# Patient Record
Sex: FEMALE | Race: BLACK OR AFRICAN AMERICAN | NOT HISPANIC OR LATINO | Employment: STUDENT | URBAN - METROPOLITAN AREA
[De-identification: names, ages, dates, MRNs, and addresses within clinical notes are randomized per-mention and may not be internally consistent; named-entity substitution may affect disease eponyms.]

---

## 2017-08-28 ENCOUNTER — PATIENT OUTREACH (OUTPATIENT)
Dept: ADMINISTRATIVE | Facility: HOSPITAL | Age: 22
End: 2017-08-28

## 2019-10-24 ENCOUNTER — OFFICE VISIT (OUTPATIENT)
Dept: INTERNAL MEDICINE | Facility: CLINIC | Age: 24
End: 2019-10-24
Payer: COMMERCIAL

## 2019-10-24 ENCOUNTER — HOSPITAL ENCOUNTER (OUTPATIENT)
Dept: RADIOLOGY | Facility: HOSPITAL | Age: 24
Discharge: HOME | End: 2019-10-24
Attending: INTERNAL MEDICINE
Payer: COMMERCIAL

## 2019-10-24 VITALS
HEART RATE: 86 BPM | WEIGHT: 145.75 LBS | TEMPERATURE: 98 F | BODY MASS INDEX: 26.23 KG/M2 | SYSTOLIC BLOOD PRESSURE: 112 MMHG | OXYGEN SATURATION: 99 % | DIASTOLIC BLOOD PRESSURE: 80 MMHG

## 2019-10-24 DIAGNOSIS — M54.6 ACUTE MIDLINE THORACIC BACK PAIN: ICD-10-CM

## 2019-10-24 DIAGNOSIS — M54.6 ACUTE MIDLINE THORACIC BACK PAIN: Primary | ICD-10-CM

## 2019-10-24 DIAGNOSIS — M54.9 TENDERNESS OVER SPINE: ICD-10-CM

## 2019-10-24 PROCEDURE — 99999 PR PBB SHADOW E&M-EST. PATIENT-LVL III: CPT | Mod: PBBFAC,,, | Performed by: INTERNAL MEDICINE

## 2019-10-24 PROCEDURE — 99214 OFFICE O/P EST MOD 30 MIN: CPT | Mod: S$GLB,,, | Performed by: INTERNAL MEDICINE

## 2019-10-24 PROCEDURE — 72070 X-RAY EXAM THORAC SPINE 2VWS: CPT | Mod: 26,,, | Performed by: RADIOLOGY

## 2019-10-24 PROCEDURE — 3008F PR BODY MASS INDEX (BMI) DOCUMENTED: ICD-10-PCS | Mod: CPTII,S$GLB,, | Performed by: INTERNAL MEDICINE

## 2019-10-24 PROCEDURE — 99999 PR PBB SHADOW E&M-EST. PATIENT-LVL III: ICD-10-PCS | Mod: PBBFAC,,, | Performed by: INTERNAL MEDICINE

## 2019-10-24 PROCEDURE — 3008F BODY MASS INDEX DOCD: CPT | Mod: CPTII,S$GLB,, | Performed by: INTERNAL MEDICINE

## 2019-10-24 PROCEDURE — 99214 PR OFFICE/OUTPT VISIT, EST, LEVL IV, 30-39 MIN: ICD-10-PCS | Mod: S$GLB,,, | Performed by: INTERNAL MEDICINE

## 2019-10-24 PROCEDURE — 72070 XR THORACIC SPINE AP LATERAL: ICD-10-PCS | Mod: 26,,, | Performed by: RADIOLOGY

## 2019-10-24 PROCEDURE — 72070 X-RAY EXAM THORAC SPINE 2VWS: CPT | Mod: TC

## 2019-10-24 RX ORDER — CYCLOBENZAPRINE HCL 10 MG
TABLET ORAL
Qty: 30 TABLET | Refills: 0 | Status: SHIPPED | OUTPATIENT
Start: 2019-10-24 | End: 2020-10-12

## 2019-10-24 RX ORDER — METHYLPREDNISOLONE 4 MG/1
TABLET ORAL
Qty: 1 PACKAGE | Refills: 0 | Status: SHIPPED | OUTPATIENT
Start: 2019-10-24 | End: 2020-10-12

## 2019-10-24 NOTE — PROGRESS NOTES
Subjective:      Patient ID: Swati Durán is a 24 y.o. female.    Chief Complaint: Back Pain    Back Pain   This is a new problem. The current episode started in the past 7 days. The problem occurs constantly. The problem is unchanged. The pain is present in the thoracic spine. The quality of the pain is described as aching and stabbing. The pain does not radiate. The pain is at a severity of 7/10. The pain is moderate. The pain is the same all the time. The symptoms are aggravated by bending, position and twisting. Stiffness is present all day. Pertinent negatives include no abdominal pain, bladder incontinence, bowel incontinence, chest pain, dysuria, fever, headaches, leg pain, numbness, paresis, paresthesias, pelvic pain, perianal numbness, tingling, weakness or weight loss. She has tried NSAIDs and heat for the symptoms. The treatment provided mild relief.      25 yo with   Patient Active Problem List   Diagnosis    Chronic dislocation of right shoulder    Myofacial muscle pain    Rotator cuff tendinitis    Winged scapula of right side    Recurrent subluxation of shoulder     History reviewed. No pertinent past medical history.  Here today c/o back pain.   Woke up with pain. No trauma. Worsened by twisting to the left and  rising from a bent over position and extension.   Answers for HPI/ROS submitted by the patient on 10/24/2019   Back pain  genital pain: No    History reviewed. No pertinent surgical history.  Social History     Socioeconomic History    Marital status: Single     Spouse name: Not on file    Number of children: Not on file    Years of education: Not on file    Highest education level: Not on file   Occupational History    Not on file   Social Needs    Financial resource strain: Not very hard    Food insecurity:     Worry: Never true     Inability: Never true    Transportation needs:     Medical: No     Non-medical: No   Tobacco Use    Smoking status: Never Smoker     Smokeless tobacco: Never Used   Substance and Sexual Activity    Alcohol use: No     Alcohol/week: 0.0 standard drinks     Frequency: Monthly or less     Drinks per session: 1 or 2     Binge frequency: Never    Drug use: No    Sexual activity: Not on file   Lifestyle    Physical activity:     Days per week: 6 days     Minutes per session: 70 min    Stress: Not at all   Relationships    Social connections:     Talks on phone: Three times a week     Gets together: Once a week     Attends [employer] service: Not on file     Active member of club or organization: Yes     Attends meetings of clubs or organizations: More than 4 times per year     Relationship status: Never    Other Topics Concern    Not on file   Social History Narrative    Not on file     family history includes Breast cancer in her maternal grandmother; Hypertension in her mother; Lung cancer in her paternal grandfather; No Known Problems in her father.    Review of Systems   Constitutional: Negative for fever and weight loss.   Respiratory: Negative for shortness of breath and wheezing.    Cardiovascular: Negative for chest pain, palpitations and leg swelling.   Gastrointestinal: Negative for abdominal pain and bowel incontinence.   Genitourinary: Negative for bladder incontinence, dysuria, hematuria and pelvic pain.   Musculoskeletal: Positive for back pain. Negative for gait problem, joint swelling, neck pain and neck stiffness.   Skin: Negative for rash.   Neurological: Negative for dizziness, tingling, weakness, light-headedness, numbness, headaches and paresthesias.     Objective:   /80 (BP Location: Right arm, Patient Position: Sitting, BP Method: Medium (Manual))   Pulse 86   Temp 97.5 °F (36.4 °C) (Tympanic)   Wt 66.1 kg (145 lb 11.6 oz)   LMP 10/18/2019   SpO2 99%   BMI 26.23 kg/m²     Physical Exam   Constitutional: She is oriented to person, place, and time. She appears well-developed and well-nourished. No  distress.   HENT:   Head: Atraumatic.   Right Ear: External ear normal.   Eyes: Conjunctivae and EOM are normal.   Neck: Normal range of motion. Neck supple.   Cardiovascular: Normal rate and regular rhythm.   Pulmonary/Chest: Effort normal and breath sounds normal.   Musculoskeletal: She exhibits tenderness. She exhibits no edema or deformity.        Thoracic back: She exhibits tenderness and bony tenderness. She exhibits normal range of motion, no swelling and no spasm.        Back:    Neurological: She is alert and oriented to person, place, and time.   Skin: Skin is warm and dry.   Psychiatric: She has a normal mood and affect. Her behavior is normal.       Assessment:     1. Acute midline thoracic back pain      Plan:   Acute midline thoracic back pain  -     cyclobenzaprine (FLEXERIL) 10 MG tablet; 1/2 to one tab po qhs prn muscle spasm  Dispense: 30 tablet; Refill: 0  -     methylPREDNISolone (MEDROL, YANET,) 4 mg tablet; use as directed  Dispense: 1 Package; Refill: 0  -     X-Ray Thoracic Spine AP Lateral; Future; Expected date: 10/24/2019    advised avoid impact exercising and heaving lifting until healed.         Problem List Items Addressed This Visit     None      Visit Diagnoses     Acute midline thoracic back pain    -  Primary    Relevant Medications    cyclobenzaprine (FLEXERIL) 10 MG tablet    methylPREDNISolone (MEDROL, YANET,) 4 mg tablet    Other Relevant Orders    X-Ray Thoracic Spine AP Lateral          Follow up if symptoms worsen or fail to improve.

## 2020-02-17 ENCOUNTER — HOSPITAL ENCOUNTER (OUTPATIENT)
Dept: RADIOLOGY | Facility: HOSPITAL | Age: 25
Discharge: HOME | End: 2020-02-17
Attending: ORTHOPAEDIC SURGERY
Payer: COMMERCIAL

## 2020-02-17 ENCOUNTER — OFFICE VISIT (OUTPATIENT)
Dept: ORTHOPEDICS | Facility: CLINIC | Age: 25
End: 2020-02-17
Payer: COMMERCIAL

## 2020-02-17 ENCOUNTER — TELEPHONE (OUTPATIENT)
Dept: ORTHOPEDICS | Facility: CLINIC | Age: 25
End: 2020-02-17

## 2020-02-17 VITALS
WEIGHT: 145 LBS | HEART RATE: 59 BPM | DIASTOLIC BLOOD PRESSURE: 74 MMHG | BODY MASS INDEX: 25.69 KG/M2 | HEIGHT: 63 IN | SYSTOLIC BLOOD PRESSURE: 112 MMHG

## 2020-02-17 DIAGNOSIS — M25.311 INSTABILITY OF BOTH SHOULDER JOINTS: Primary | ICD-10-CM

## 2020-02-17 DIAGNOSIS — M25.511 RIGHT SHOULDER PAIN, UNSPECIFIED CHRONICITY: ICD-10-CM

## 2020-02-17 DIAGNOSIS — M25.311 INSTABILITY OF RIGHT SHOULDER JOINT: Primary | ICD-10-CM

## 2020-02-17 DIAGNOSIS — M25.511 RIGHT SHOULDER PAIN, UNSPECIFIED CHRONICITY: Primary | ICD-10-CM

## 2020-02-17 DIAGNOSIS — M25.312 INSTABILITY OF BOTH SHOULDER JOINTS: Primary | ICD-10-CM

## 2020-02-17 PROCEDURE — 73030 XR SHOULDER COMPLETE 2 OR MORE VIEWS RIGHT: ICD-10-PCS | Mod: 26,RT,, | Performed by: RADIOLOGY

## 2020-02-17 PROCEDURE — 73030 X-RAY EXAM OF SHOULDER: CPT | Mod: TC,RT

## 2020-02-17 PROCEDURE — 99244 PR OFFICE CONSULTATION,LEVEL IV: ICD-10-PCS | Mod: S$GLB,,, | Performed by: ORTHOPAEDIC SURGERY

## 2020-02-17 PROCEDURE — 73030 X-RAY EXAM OF SHOULDER: CPT | Mod: 26,RT,, | Performed by: RADIOLOGY

## 2020-02-17 PROCEDURE — 99999 PR PBB SHADOW E&M-EST. PATIENT-LVL III: ICD-10-PCS | Mod: PBBFAC,,, | Performed by: ORTHOPAEDIC SURGERY

## 2020-02-17 PROCEDURE — 99244 OFF/OP CNSLTJ NEW/EST MOD 40: CPT | Mod: S$GLB,,, | Performed by: ORTHOPAEDIC SURGERY

## 2020-02-17 PROCEDURE — 99999 PR PBB SHADOW E&M-EST. PATIENT-LVL III: CPT | Mod: PBBFAC,,, | Performed by: ORTHOPAEDIC SURGERY

## 2020-02-17 RX ORDER — MELOXICAM 15 MG/1
15 TABLET ORAL DAILY
Qty: 30 TABLET | Refills: 2 | Status: SHIPPED | OUTPATIENT
Start: 2020-02-17 | End: 2020-10-12

## 2020-02-17 NOTE — LETTER
February 17, 2020      Danika Fitzgerald MD  [address] [place] [address]           [facility]  [address] [address]  [place] [address]  Phone: [phone]  Fax: [phone]          Patient: Swati Durán   MR Number: [id number]   YOB: 1995   Date of Visit: 2/17/2020       Dear Dr. Danika Fitzgerald:    Thank you for referring Swati Durán to me for evaluation. Attached you will find relevant portions of my assessment and plan of care.    If you have questions, please do not hesitate to call me. I look forward to following Swati Durán along with you.    Sincerely,    Kalyan Magana MD    Enclosure  CC:  No Recipients    If you would like to receive this communication electronically, please contact [email or web address] or [phone] to request more information on [employer] access.    For providers and/or their staff who would like to refer a patient to [facility], please contact us through our one-stop-shop provider referral line, [facility] , at [phone].    If you feel you have received this communication in error or would no longer like to receive these types of communications, please e-mail [email or web address]

## 2020-02-17 NOTE — PROGRESS NOTES
Subjective:     Patient ID: Swati Durán is a 24 y.o. female.    Chief Complaint: Pain of the Right Shoulder    Consult from Dr. Danika Fitzgerald  will receive report electronically     Swati Durán is a 24 y.o. female here because she has a history of Right shoulder instability. She is starting a new job at [employer] as a .    2010 first dislocation. Last 2018. Approximately 3 times needing reduction.  Did PT/OT in 2016 with improvement.    Had MRA 2018 at [facility].    Shoulder Pain    The pain is present in the right shoulder. This is a recurrent problem. The current episode started more than 1 year ago. The problem has been resolved. The pain is at a severity of 0/10. Pertinent negatives include no fever or numbness. She has tried brace/corset, rest, injection treatment, heat and cold for the symptoms. The treatment provided significant relief. Physical therapy was effective.      History reviewed. No pertinent past medical history.  History reviewed. No pertinent surgical history.  Family History   Problem Relation Age of Onset    Hypertension Mother     No Known Problems Father     Breast cancer Maternal Grandmother     Lung cancer Paternal Grandfather     Diabetes Neg Hx     Stroke Neg Hx      Social History     Socioeconomic History    Marital status: Single     Spouse name: Not on file    Number of children: Not on file    Years of education: Not on file    Highest education level: Not on file   Occupational History    Not on file   Social Needs    Financial resource strain: Not very hard    Food insecurity:     Worry: Never true     Inability: Never true    Transportation needs:     Medical: No     Non-medical: No   Tobacco Use    Smoking status: Never Smoker    Smokeless tobacco: Never Used   Substance and Sexual Activity    Alcohol use: No     Alcohol/week: 0.0 standard drinks     Frequency: Monthly or less     Drinks per session: 1 or 2      Binge frequency: Never    Drug use: No    Sexual activity: Not on file   Lifestyle    Physical activity:     Days per week: 6 days     Minutes per session: 70 min    Stress: Not at all   Relationships    Social connections:     Talks on phone: Three times a week     Gets together: Once a week     Attends Confucianist service: Not on file     Active member of club or organization: Yes     Attends meetings of clubs or organizations: More than 4 times per year     Relationship status: Never    Other Topics Concern    Not on file   Social History Narrative    Not on file     Medication List with Changes/Refills   New Medications    MELOXICAM (MOBIC) 15 MG TABLET    Take 1 tablet (15 mg total) by mouth once daily.   Current Medications    CYCLOBENZAPRINE (FLEXERIL) 10 MG TABLET    1/2 to one tab po qhs prn muscle spasm    METHYLPREDNISOLONE (MEDROL, YANET,) 4 MG TABLET    use as directed     Review of patient's allergies indicates:  No Known Allergies  Review of Systems   Constitution: Negative for chills and fever.   HENT: Negative for ear discharge and hearing loss.    Eyes: Negative for blurred vision and visual disturbance.   Cardiovascular: Negative for chest pain and leg swelling.   Respiratory: Negative for cough and shortness of breath.    Endocrine: Negative for polyuria.   Hematologic/Lymphatic: Negative for bleeding problem.   Skin: Negative for rash.   Musculoskeletal: Negative for back pain, joint pain, joint swelling, muscle cramps and muscle weakness.   Gastrointestinal: Negative for nausea and vomiting.   Genitourinary: Negative for hematuria.   Neurological: Negative for loss of balance, numbness and paresthesias.   Psychiatric/Behavioral: Negative for altered mental status.       Objective:   Body mass index is 26.1 kg/m².  Vitals:    02/17/20 0931   BP: 112/74   Pulse: (!) 59                General    Vitals reviewed.  Constitutional: She is oriented to person, place, and time. She appears  well-developed and well-nourished. No distress.   HENT:   Head: Atraumatic.   Nose: Nose normal.   Eyes: EOM are normal. Right eye exhibits no discharge. Left eye exhibits no discharge.   Neck: Neck supple.   Cardiovascular: Normal rate and intact distal pulses.    Pulmonary/Chest: Effort normal. No respiratory distress.   Neurological: She is alert and oriented to person, place, and time. Coordination normal.   Psychiatric: She has a normal mood and affect. Her behavior is normal. Judgment and thought content normal.         Right Shoulder Exam     Inspection/Observation   Swelling: absent  Bruising: absent  Scars: absent  Deformity: absent  Scapular Winging: absent  Scapular Dyskinesia: negative  Atrophy: absent    Tenderness   The patient is tender to palpation of the biceps tendon.    Range of Motion   Active abduction: 90   Passive abduction: 100   Extension: 0   Forward Flexion: 180   Forward Elevation: 180Adduction: 40   External Rotation 0 degrees: 50   Internal rotation 0 degrees: T6     Tests & Signs   Apprehension: positive  Drop arm: negative  Henderson test: negative  Impingement: negative  Lift Off Sign: negative  Active Compression Test (Klamath's Sign): positive  Speed's Test: positive    Other   Sensation: normal    Comments:  +posterior load/shift    Left Shoulder Exam     Inspection/Observation   Swelling: absent  Bruising: absent  Scars: absent  Deformity: absent  Scapular Winging: absent  Scapular Dyskinesia: negative  Atrophy: absent    Tenderness   The patient is tender to palpation of the biceps tendon.    Range of Motion   Active abduction: 90   Passive abduction: 100   Extension: 0   Forward Flexion: 180   Forward Elevation: 180Adduction: 40   External Rotation 0 degrees: 50   Internal rotation 0 degrees: T6     Tests & Signs   Apprehension: positive  Drop arm: negative  Henderson test: negative  Impingement: negative  Lift Off Sign: negative  Active Compression test (Klamath's Sign):  negative  Speed's Test: negative  Bear Hug: negative    Other   Sensation: normal     Comments:  +posterior load/shift      Muscle Strength   Right Upper Extremity   Shoulder Abduction: 5/5   Shoulder Internal Rotation: 5/5   Shoulder External Rotation: 5/5   Supraspinatus: 5/5/5   Subscapularis: 5/5/5   Biceps: 5/5/5   Left Upper Extremity  Shoulder Abduction: 5/5   Shoulder Internal Rotation: 5/5   Shoulder External Rotation: 5/5   Supraspinatus: 5/5/5   Subscapularis: 5/5/5   Biceps: 5/5/5     Reflexes     Left Side  Biceps:  2+  Triceps:  2+    Right Side   Biceps:  2+  Triceps:  2+    Vascular Exam     Right Pulses      Radial:                    2+      Left Pulses      Radial:                    2+      Capillary Refill  Right Hand: normal capillary refill  Left Hand: normal capillary refill      Relevant imaging results reviewed and interpreted by me, discussed with the patient and / or family today   Possible small bankart avulsion on axial view.  X-ray Shoulder 2 or More Views Right  Narrative: EXAMINATION:  XR SHOULDER COMPLETE 2 OR MORE VIEWS RIGHT    CLINICAL HISTORY:  Pain in right shoulder    TECHNIQUE:  Two or three views of the right shoulder were performed.    COMPARISON:  11/13/2015    FINDINGS:  No acute osseous or soft tissue abnormality.  Impression: As above    Electronically signed by: Nicho Morgan MD  Date:    02/17/2020  Time:    09:25     Assessment:     Encounter Diagnosis   Name Primary?    Instability of right shoulder joint Yes        Plan:     We reviewed with Swati today, the pathology and natural history of her diagnosis. We had an extensive discussion as to the conservative treatment and management of their condition. We also discussed the variety of treatment options to include medication, physical therapy, diagnostic testing as well as other treatments.The decision was made to go forward with:    -PT/OT-can eval for any limitations for her work  -F/up 6-8wk  -obtain MRA  result from [facility]  Discussed possible need for surgery if fails PT/OT for recurrent instability  -mobic prn        Disclaimer: This note was prepared using a voice recognition system and is likely to have sound alike errors within the text.

## 2020-02-17 NOTE — TELEPHONE ENCOUNTER
Spoke with pt and requested she get MRA disc mailed to us, or she can drop it off. She states she will drop off as soon as she gets it done, AL, LPN.       ----- Message from Kalyan Magana MD sent at 2/17/2020 12:13 PM CST -----  Please obtain MRA disc/results from [facility] of her right shoulder. Thanks.

## 2020-02-28 ENCOUNTER — TELEPHONE (OUTPATIENT)
Dept: ORTHOPEDICS | Facility: CLINIC | Age: 25
End: 2020-02-28

## 2020-02-28 NOTE — TELEPHONE ENCOUNTER
Called pt to inform her that her outside images was successfully uploaded into her chart. I asked her if she would like the disc mailed back, she agreed and confirmed her mailing address.

## 2020-03-11 ENCOUNTER — CLINICAL SUPPORT (OUTPATIENT)
Dept: REHABILITATION | Facility: HOSPITAL | Age: 25
End: 2020-03-11
Attending: ORTHOPAEDIC SURGERY
Payer: COMMERCIAL

## 2020-03-11 DIAGNOSIS — G25.89 SCAPULAR DYSKINESIS: ICD-10-CM

## 2020-03-11 DIAGNOSIS — M25.312 INSTABILITY OF BOTH SHOULDER JOINTS: ICD-10-CM

## 2020-03-11 DIAGNOSIS — R53.1 WEAKNESS: Primary | ICD-10-CM

## 2020-03-11 DIAGNOSIS — M25.311 INSTABILITY OF BOTH SHOULDER JOINTS: ICD-10-CM

## 2020-03-11 PROCEDURE — 97140 MANUAL THERAPY 1/> REGIONS: CPT

## 2020-03-11 PROCEDURE — 97110 THERAPEUTIC EXERCISES: CPT

## 2020-03-11 PROCEDURE — 97161 PT EVAL LOW COMPLEX 20 MIN: CPT
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
## 2020-03-13 ENCOUNTER — CLINICAL SUPPORT (OUTPATIENT)
Dept: REHABILITATION | Facility: HOSPITAL | Age: 25
End: 2020-03-13
Payer: COMMERCIAL

## 2020-03-13 DIAGNOSIS — G25.89 SCAPULAR DYSKINESIS: ICD-10-CM

## 2020-03-13 DIAGNOSIS — R53.1 WEAKNESS: ICD-10-CM

## 2020-03-13 PROCEDURE — 97110 THERAPEUTIC EXERCISES: CPT

## 2020-03-13 PROCEDURE — 97140 MANUAL THERAPY 1/> REGIONS: CPT

## 2020-03-14 NOTE — PROGRESS NOTES
Physical Therapy Daily Treatment Note     Name: Swati Swann Leeanna  Clinic Number: [id number]    Therapy Diagnosis:   Encounter Diagnoses   Name Primary?    Weakness     Scapular dyskinesis      Physician: Kalyan Magana, *    Visit Date: 3/13/2020    Physician Orders: PT Eval and Treat   Medical Diagnosis from Referral: Instability of both shoulder joints  Evaluation Date: 3/11/2020  Authorization Period Expiration: 9/1/2020  Plan of Care Expiration: 4/26/2020  Visit # / Visits authorized: 2/20     Time In: 8:30 am  Time Out: 9:30 am  Total Billable Time: 39 minutes    Precautions: Standard    Subjective     Pt reports: She has tried her exercises and she can tell they are working the muscles in her shoulders and shoulder blades.    She was compliant with home exercise program.  Response to previous treatment: Good  Functional change: None reported    Pain: 0/10  Location: bilateral shoulders      Objective     Swati received therapeutic exercises to develop strength, endurance, posture and scapular stability for 29 minutes including:    LT isometrics prone 2'  scap retraction isometrics prone 2'  Peter pickering 2/10  ABC's 1#, 1 x ea  Serratus punch 1#, 2/10  Chest press/elbow extension-supine, arm at side no extension 1#, 2/10  Scapular slides with foam roll 10x  Scapular clocks 6,7,8,9 ( mid scapula) 10x/ea  Glynn V (no ABD) 10x/2 RTB    Swati received the following manual therapy techniques: Myofacial release, Soft tissue Mobilization and manual releases were applied to the: lateral scapular borders, subscapularis mm, upper trap and levatr scapula B for 10 minutes    Home Exercises Provided and Patient Education Provided     Education provided:   - HEP    Written Home Exercises Provided: Patient instructed to cont prior HEP.  Exercises were reviewed and Swati was able to demonstrate them prior to the end of the session.  Swati demonstrated good  understanding of the education provided.     See  EMR under Patient Instructions for exercises provided prior visit.    Assessment     Good tolerance and performance of all therex, she requires some cues for scapular stability and posture.    Swati is progressing well towards her goals.   Pt prognosis is Good.     Pt will continue to benefit from skilled outpatient physical therapy to address the deficits listed in the problem list box on initial evaluation, provide pt/family education and to maximize pt's level of independence in the home and community environment.     Pt's spiritual, cultural and educational needs considered and pt agreeable to plan of care and goals.     Anticipated barriers to physical therapy: history of repetitive dislocations    Goals:   Short Term Goals: In 3 weeks   1.Pt to be educated on HEP.  2.Patient to increase strength by 1/2 grade.  3.Patient to improve score on the FOTO.  4. Pt to be educated on postural and body mechanics awareness.     Long Term Goals: In 6 weeks  1. Patient to improve score on the FOTO to 15% or less.  2. Patient to demo increase in UE strength to 4+/5.  3. Patient to perform daily activities including work activities, lifting overhead and increased physical activities without limitation.    Plan     Pt wishes to put therapy on hold until she returns to see Dr Magana in about 3 weeks.  She will continue with current program/HEP.    Ricarda Quintero, PT

## 2020-03-16 PROBLEM — R53.1 WEAKNESS: Status: ACTIVE | Noted: 2020-03-16

## 2020-03-16 PROBLEM — G25.89 SCAPULAR DYSKINESIS: Status: ACTIVE | Noted: 2020-03-16

## 2020-03-24 ENCOUNTER — TELEPHONE (OUTPATIENT)
Dept: ORTHOPEDICS | Facility: CLINIC | Age: 25
End: 2020-03-24

## 2020-03-24 NOTE — TELEPHONE ENCOUNTER
Attempted to reschedule pt for Tele-Health visit due to COVID-19 concern. Pt decided to cancel appt for now, states she will call back to reschedule at a later time. She was thankful for the call, AL, LPN.

## 2020-05-14 ENCOUNTER — TELEPHONE (OUTPATIENT)
Dept: ORTHOPEDICS | Facility: CLINIC | Age: 25
End: 2020-05-14

## 2020-06-15 ENCOUNTER — OFFICE VISIT (OUTPATIENT)
Dept: ORTHOPEDICS | Facility: CLINIC | Age: 25
End: 2020-06-15
Payer: COMMERCIAL

## 2020-06-15 ENCOUNTER — HOSPITAL ENCOUNTER (OUTPATIENT)
Dept: RADIOLOGY | Facility: HOSPITAL | Age: 25
Discharge: HOME | End: 2020-06-15
Attending: PHYSICAL MEDICINE & REHABILITATION
Payer: COMMERCIAL

## 2020-06-15 VITALS — WEIGHT: 145 LBS | BODY MASS INDEX: 26.68 KG/M2 | HEIGHT: 62 IN

## 2020-06-15 DIAGNOSIS — M25.512 LEFT SHOULDER PAIN, UNSPECIFIED CHRONICITY: Primary | ICD-10-CM

## 2020-06-15 DIAGNOSIS — M25.512 LEFT SHOULDER PAIN, UNSPECIFIED CHRONICITY: ICD-10-CM

## 2020-06-15 DIAGNOSIS — G89.29 CHRONIC LEFT SHOULDER PAIN: ICD-10-CM

## 2020-06-15 DIAGNOSIS — M25.512 CHRONIC LEFT SHOULDER PAIN: ICD-10-CM

## 2020-06-15 DIAGNOSIS — M25.512 ACUTE PAIN OF LEFT SHOULDER: ICD-10-CM

## 2020-06-15 DIAGNOSIS — S43.432A TEAR OF LEFT GLENOID LABRUM, INITIAL ENCOUNTER: Primary | ICD-10-CM

## 2020-06-15 PROCEDURE — 73030 XR SHOULDER COMPLETE 2 OR MORE VIEWS LEFT: ICD-10-PCS | Mod: 26,LT,, | Performed by: RADIOLOGY

## 2020-06-15 PROCEDURE — 3008F PR BODY MASS INDEX (BMI) DOCUMENTED: ICD-10-PCS | Mod: CPTII,S$GLB,, | Performed by: PHYSICAL MEDICINE & REHABILITATION

## 2020-06-15 PROCEDURE — 3008F BODY MASS INDEX DOCD: CPT | Mod: CPTII,S$GLB,, | Performed by: PHYSICAL MEDICINE & REHABILITATION

## 2020-06-15 PROCEDURE — 99999 PR PBB SHADOW E&M-EST. PATIENT-LVL III: CPT | Mod: PBBFAC,,, | Performed by: PHYSICAL MEDICINE & REHABILITATION

## 2020-06-15 PROCEDURE — 73030 X-RAY EXAM OF SHOULDER: CPT | Mod: TC,LT

## 2020-06-15 PROCEDURE — 73030 X-RAY EXAM OF SHOULDER: CPT | Mod: 26,LT,, | Performed by: RADIOLOGY

## 2020-06-15 PROCEDURE — 99214 OFFICE O/P EST MOD 30 MIN: CPT | Mod: S$GLB,,, | Performed by: PHYSICAL MEDICINE & REHABILITATION

## 2020-06-15 PROCEDURE — 99999 PR PBB SHADOW E&M-EST. PATIENT-LVL III: ICD-10-PCS | Mod: PBBFAC,,, | Performed by: PHYSICAL MEDICINE & REHABILITATION

## 2020-06-15 PROCEDURE — 99214 PR OFFICE/OUTPT VISIT, EST, LEVL IV, 30-39 MIN: ICD-10-PCS | Mod: S$GLB,,, | Performed by: PHYSICAL MEDICINE & REHABILITATION

## 2020-06-15 NOTE — PROGRESS NOTES
SPORTS MEDICINE / PM&R NEW PATIENT H & P:    Referring Physician: Self, Aaareferral    Chief Complaint   Patient presents with    Left Shoulder - Pain       HPI: This is a 24 y.o.  female being seen in clinic today for evaluation of Pain of the Left Shoulder   The problem first began in March of 2020, apparently after a physical exam of her shoulders. She feels sharp, dull, aching, sore and stabbing pain on her left shoulder. The symptoms are worsening. She has tried rest and heating pad without improvement. She has tried physical therapy at [facility], but only to determine work restrictions. Works as  at [employer].    History obtained from patient.    Past family, medical, social, surgical history, and vital signs reviewed in chart.    Review of Systems   Constitutional: Negative for chills, fever and weight loss.   HENT: Negative for hearing loss and sore throat.    Eyes: Negative for blurred vision, photophobia and pain.   Respiratory: Negative for shortness of breath.    Cardiovascular: Negative for chest pain.   Gastrointestinal: Negative for abdominal pain.   Genitourinary: Negative for dysuria.   Skin: Negative for rash.   Neurological: Negative for tingling and headaches.   Endo/Heme/Allergies: Does not bruise/bleed easily.   Psychiatric/Behavioral: Negative for depression.       General    Nursing note and vitals reviewed.  Constitutional: She is oriented to person, place, and time. She appears well-developed and well-nourished.   HENT:   Head: Normocephalic and atraumatic.   Eyes: Conjunctivae and EOM are normal. Pupils are equal, round, and reactive to light.   Neck: Neck supple.   Cardiovascular: Intact distal pulses.    Pulmonary/Chest: Effort normal. No respiratory distress.   Abdominal: She exhibits no distension.   Neurological: She is alert and oriented to person, place, and time. She has normal reflexes.   Psychiatric: She has a normal mood and affect.         Right Shoulder Exam    Right shoulder exam is normal.    Inspection/Observation   Scars: absent  Scapular Winging: absent  Scapular Dyskinesia: negative    Range of Motion   Active abduction: normal   Passive abduction: normal   Extension: normal   Forward Flexion: normal   Adduction: normal  External Rotation 0 degrees: normal   Internal rotation 0 degrees: normal     Muscle Strength   The patient has normal right shoulder strength.    Tests & Signs   Apprehension: negative  Henderson test: negative  Impingement: negative  Sulcus: absent  Speed's Test: negative  Jerk Test: negative    Other   Sensation: normal    Left Shoulder Exam     Inspection/Observation   Scars: absent  Scapular Winging: absent  Scapular Dyskinesia: positive    Tenderness   The patient is tender to palpation of the greater tuberosity and biceps tendon.    Range of Motion   Active abduction:  140 abnormal   Passive abduction:  150 abnormal   Extension: normal   Forward Flexion: abnormal   Adduction: normal  External Rotation 0 degrees: normal   Internal rotation 0 degrees: abnormal     Muscle Strength   The patient has normal left shoulder strength.    Tests & Signs   Apprehension: positive  Henderson test: positive  Impingement: negative  Sulcus: absent  Rotator Cuff Painful Arc/Range: mild  Speed's Test: positive  Jerk Test: positive    Other   Sensation: normal       Reflexes     Left Side  Biceps:  2+  Brachioradialis:  2+  Left Hale's Sign:  Absent    Right Side   Biceps:  2+  Brachioradialis:  2+  Right Hale's Sign:  absent    Vascular Exam     Right Pulses      Radial:                    2+      Left Pulses      Radial:                    2+      Capillary Refill  Right Hand: normal capillary refill  Left Hand: normal capillary refill        Narrative & Impression     EXAMINATION:  XR SHOULDER COMPLETE 2 OR MORE VIEWS LEFT     CLINICAL HISTORY:  get all views including Axillary;Pain in left shoulder     TECHNIQUE:  Two or three views of the left shoulder  were performed.     COMPARISON:  None     FINDINGS:  No acute osseous or soft tissue abnormality.     Impression:     As above        Electronically signed by: Nicho Morgan MD  Date:                                            06/15/2020  Time:                                           14:57         IMPRESSION/PLAN: Swati is a 24 y.o.  female with:    1. Tear of left glenoid labrum, initial encounter    2. Acute pain of left shoulder       The findings were discussed with Swati in detail. Her symptoms and exam are concerning for labral tear. She has pretty good ROM and full strength within that ROM. We obtained the x-rays above today, but note we would need MRA to fully eval for labral tear. We'll hold off on therapy until we have a more specific diagnosis. All of her questions were answered. She was provided this plan in writing. She will follow-up with me after MRI.     Vianey Zamora M.D.  Sports Medicine

## 2020-07-15 ENCOUNTER — HOSPITAL ENCOUNTER (OUTPATIENT)
Dept: RADIOLOGY | Facility: HOSPITAL | Age: 25
Discharge: HOME | End: 2020-07-15
Attending: PHYSICAL MEDICINE & REHABILITATION
Payer: COMMERCIAL

## 2020-07-15 DIAGNOSIS — M25.512 CHRONIC LEFT SHOULDER PAIN: ICD-10-CM

## 2020-07-15 DIAGNOSIS — S43.432A TEAR OF LEFT GLENOID LABRUM, INITIAL ENCOUNTER: ICD-10-CM

## 2020-07-15 DIAGNOSIS — G89.29 CHRONIC LEFT SHOULDER PAIN: ICD-10-CM

## 2020-07-15 PROCEDURE — 73040 CONTRAST X-RAY OF SHOULDER: CPT | Mod: TC,LT

## 2020-07-15 PROCEDURE — 73222 MRI JOINT UPR EXTREM W/DYE: CPT | Mod: TC,LT

## 2020-07-15 PROCEDURE — A9585 GADOBUTROL INJECTION: HCPCS | Performed by: PHYSICAL MEDICINE & REHABILITATION

## 2020-07-15 PROCEDURE — 73040 XR ARTHROGRAM SHOULDER LEFT: ICD-10-PCS | Mod: 26,LT,, | Performed by: RADIOLOGY

## 2020-07-15 PROCEDURE — 73222 MRI JOINT UPR EXTREM W/DYE: CPT | Mod: 26,LT,, | Performed by: RADIOLOGY

## 2020-07-15 PROCEDURE — 23350 XR ARTHROGRAM SHOULDER LEFT: ICD-10-PCS | Mod: ,,, | Performed by: RADIOLOGY

## 2020-07-15 PROCEDURE — 23350 INJECTION FOR SHOULDER X-RAY: CPT | Mod: ,,, | Performed by: RADIOLOGY

## 2020-07-15 PROCEDURE — 73222 MRI ARTHROGRAM SHOULDER WITH CONTRAST LEFT: ICD-10-PCS | Mod: 26,LT,, | Performed by: RADIOLOGY

## 2020-07-15 PROCEDURE — 25500020 PHARM REV CODE 255: Performed by: PHYSICAL MEDICINE & REHABILITATION

## 2020-07-15 PROCEDURE — 73040 CONTRAST X-RAY OF SHOULDER: CPT | Mod: 26,LT,, | Performed by: RADIOLOGY

## 2020-07-15 RX ORDER — GADOBUTROL 604.72 MG/ML
0.1 INJECTION INTRAVENOUS
Status: COMPLETED | OUTPATIENT
Start: 2020-07-15 | End: 2020-07-15

## 2020-07-15 RX ADMIN — GADOBUTROL 0.1 ML: 604.72 INJECTION INTRAVENOUS at 08:07

## 2020-07-15 RX ADMIN — IOHEXOL 10 ML: 350 INJECTION, SOLUTION INTRAVENOUS at 08:07

## 2020-07-17 ENCOUNTER — TELEPHONE (OUTPATIENT)
Dept: ORTHOPEDICS | Facility: CLINIC | Age: 25
End: 2020-07-17

## 2020-07-17 NOTE — TELEPHONE ENCOUNTER
----- Message from Vianey Zamora MD sent at 7/17/2020  9:46 AM CDT -----  Good morning. Please contact this patient and let her know MRI showed a small injury to some cartilage in the shoulder. Aristides looked at it and says no surgery needed, just recommends more PT. Ask her where she'd like to go and me or you can place an order. Then she can follow-up with me in 4 - 6 weeks.    Thanks,  Vianey

## 2020-07-19 DIAGNOSIS — M25.512 CHRONIC LEFT SHOULDER PAIN: Primary | ICD-10-CM

## 2020-07-19 DIAGNOSIS — G89.29 CHRONIC LEFT SHOULDER PAIN: Primary | ICD-10-CM

## 2020-07-28 ENCOUNTER — CLINICAL SUPPORT (OUTPATIENT)
Dept: REHABILITATION | Facility: HOSPITAL | Age: 25
End: 2020-07-28
Attending: PHYSICAL MEDICINE & REHABILITATION
Payer: COMMERCIAL

## 2020-07-28 DIAGNOSIS — M25.512 CHRONIC LEFT SHOULDER PAIN: ICD-10-CM

## 2020-07-28 DIAGNOSIS — R53.1 DECREASED STRENGTH: Primary | ICD-10-CM

## 2020-07-28 DIAGNOSIS — M25.512 ACUTE PAIN OF LEFT SHOULDER: ICD-10-CM

## 2020-07-28 DIAGNOSIS — G89.29 CHRONIC LEFT SHOULDER PAIN: ICD-10-CM

## 2020-07-28 PROCEDURE — 97161 PT EVAL LOW COMPLEX 20 MIN: CPT

## 2020-07-28 PROCEDURE — 97140 MANUAL THERAPY 1/> REGIONS: CPT

## 2020-07-30 PROBLEM — G25.89 SCAPULAR DYSKINESIS: Status: RESOLVED | Noted: 2020-03-16 | Resolved: 2020-07-30

## 2020-07-30 PROBLEM — R53.1 WEAKNESS: Status: RESOLVED | Noted: 2020-03-16 | Resolved: 2020-07-30

## 2020-07-30 NOTE — PLAN OF CARE
"[facility]  Physical Therapy Initial Evaluation    Name: Swati Durán  Clinic Number: [id number]    Therapy Diagnosis:   Encounter Diagnosis   Name Primary?    Chronic left shoulder pain      Physician: Vianey Zamora MD    Physician Orders: PT Eval and Treat  Medical Diagnosis from Referral: chronic left shoulder pain  Evaluation Date: 7/28/2020  Authorization Period Expiration: 07/19/2021  Plan of Care Expiration: 10/26/2020  Visit # / Visits authorized: 1/1    Precautions: Standard    Time In: 11:48 am  Time Out: 12:32 pm  Total Billable Time: 10 minutes    SUBJECTIVE   Date of onset: late April/early May 2020  History of current condition - Swati is a 24 y.o. female whom reports left shoulder pain since late April/early May of this year. 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 Around the end of April/beginning of May, she noticed she was unable to sleep with her arms around her head like she usually does because her left shoulder was hurting. Since then, her left shoulder has been hurting constantly. She stated that sometimes it feels like it is going to roll out of the joint. Patient reports being able to do gymnastics through June, but by then, the pain had gotten to the point where it became unbearable. She reports that she tried to do a backhandspring last week at the [employer] but that it "felt weird" and caused pain in the shoulder. She states that she thinks it is the initial impact that hurts the most.     Medical History:   No past medical history on file.    Surgical History:   Swati Durán  has no past surgical history on file.    Medications:   Swati has a current medication list which includes the following prescription(s): cyclobenzaprine, meloxicam, and methylprednisolone.    Allergies:   Review of patient's " "allergies indicates:  No Known Allergies     Imaging: MRI studies on 7/15/20  Findings:  1. Normal labrum. No evidence to suggest a rotator cuff tear.  2. Tiny full-thickness cartilaginous defect seen within the central glenoid.    Prior Therapy: Yes on other shoulder  Social History: Pt lives alone  Occupation: Pt works at [employer] as a ; has been a cheer  part time for 11 years  Prior Level of Function: Fully independent in all ADLs, work and recreational activities  Current Level of Function: Trouble with front and lateral weighted raises when demoing these exercises at work. Is not able to  anything heavy. Limited with tumbling.    Pain:  Current 6/10, worst 8/10, best 0/10   Location: right shoulder, anterior and posterior aspects and within the joint  Description: Tight and Sharp; "excruciating pain"  Aggravating Factors: movements, especially horizontal adduction and abduction  Easing Factors: pain medication and rest    Dominant Extremity: Right    Pts goals: Pt reported goals are to be pain-free and wants to be able to tumble again and be able to do pushups    OBJECTIVE   (x = not tested due to pain and/or inability to obtain test position)    RANGE OF MOTION:    Cervical Right   (spine)  7/28/2020 Left     7/28/2020 Goal   Cervical Flexion (60) 60 --- N/A   Cervical Extension (90) 75 --- 85   Cervical Side Bending (45) 40 40 45 B     Shoulder AROM Right  7/28/2020 Left  7/28/2020 Goal   Shoulder Flexion (180) Full 170 180   Shoulder Extension (60) 50 55 60   Shoulder Abduction (180) 162 110 p! 165 B   Shoulder ER (90) R = L, WFL -- N/A   Shoulder IR (70) R = L, WFL -- N/A       STRENGTH:    U/E MMT Right  7/28/2020 Left  7/28/2020 Goal   Cervical SB 4-/5 4-/5 5/5 B   Shoulder Elevation 4+/5 4+/5 5/5 B   Shoulder Flexion 4/5 4/5 5/5 B   Shoulder Extension 4+/5 4/5 5/5 B   Shoulder Abduction 4+/5 4/5 5/5 B   Shoulder IR 4/5 4-/5 5/5 B   Shoulder ER 4/5 4-/5 5/5 B "   Elbow Flexion  4+/5 4/5 5/5 B   Elbow Extension 4+/5 4/5 5/5 B       MUSCLE LENGTH:     Muscle Tested  Right  7/28/2020 Left   7/28/2020 Goal   Upper Trapezius  decreased decreased Normal B    Levator Scapulae  decreased decreased Normal B   Pectoralis Minor  decreased decreased Normal B   Pectoralis Major decreased decreased Normal B     JOINT MOBILITY:     Joint Motion Tested Right  (spine)  7/28/2020 Left   7/28/2020 Goal   Glenohumeral Hypermobile Hypermobile Normal B       SPECIAL TESTS:     Right  (spine)  7/28/2020 Left   7/28/2020 Goal   Compression Negative -- Negative B        Sensation: Sensation is intact to light touch in bilateral UEs    Palpation: Increased tone and tenderness noted with palpation of bilateral suboccipital muscles , cervical paraspinals, upper trapezius, levator scapulae , pectoralis major, pectoralis minor, supraspinatus , infraspinatus , teres major and minor  and periscapular musculature.    Posture: Pt presents with postural abnormalities which include: forward head, rounded shoulders  and scapular winging and B protraction, R shoulder elevation when compared to L, resting in R cervical rotation    Gait Analysis: The patient ambulated with the following assistive device: none; the pt presents with the following gait abnormalities: decreased reciprocal arm swing       FUNCTION:     CMS Impairment/Limitation/Restriction for FOTO Shoulder Survey    Therapist reviewed FOTO scores for Swati Durán on 7/28/2020.   FOTO documents entered into [employer] - see Media section.    Limitation Score: 17%         TREATMENT   Treatment Time In: 12:10 pm  Treatment Time Out: 12:20 pm  Total Treatment time separate from Evaluation: 10 minutes    Swati received the following manual therapy techniques: Myofacial release and Soft tissue Mobilization were applied for 5 minutes including releases to: subscapularis, upper trap, levator scapulae; scapular tilting      Home Exercises and Patient  "Education Provided    Education/Self-Care provided: (5 minutes)   Patient educated on the impairments noted above and the effects of physical therapy intervention to improve overall condition and QOL.     Written Home Exercises Provided: yes.  Exercises were reviewed and Swati was able to demonstrate them prior to the end of the session.  Swati demonstrated good  understanding of the education provided.     See EMR under Patient Instructions for exercises provided 7/28/2020.    ASSESSMENT   Swati is a 24 y.o. female referred to outpatient Physical Therapy with a medical diagnosis of chronic left shoulder pain and instability. Pt presents with impairments including: decreased ROM, decreased strength, joint hypermobility , decreased muscle length, postural abnormalities and gait abnormalities. These impairments are limiting her ability to perform activities such as weighted front and lateral raises, engaging in rigorous contact sports, and lifting a 50 lb item and placing it on a shelf overhead.    Pt prognosis is Excellent.   Pt will benefit from skilled outpatient Physical Therapy to address the deficits stated above and in the chart below, provide pt/family education, and to maximize pt's level of independence.     Plan of care discussed with patient: Yes  Pt's spiritual, cultural and educational needs considered and patient is agreeable to the plan of care and goals as stated below:     Anticipated Barriers for therapy: none    Medical Necessity is demonstrated by the following  History  Co-morbidities and personal factors that may impact the plan of care Co-morbidities:   none    Personal Factors:   no deficits     low   Examination  Body Structures and Functions, activity limitations and participation restrictions that may impact the plan of care Body Regions:   upper extremities    Body Systems:    ROM  strength  gait  motor control    Participation Restrictions:   See above in "Current Level of " "Function"     Activity limitations:   Learning and applying knowledge  no deficits    General Tasks and Commands  no deficits    Communication  no deficits    Mobility  lifting and carrying objects    Self care  no deficits    Domestic Life  no deficits    Interactions/Relationships  no deficits    Life Areas  no deficits    Community and Social Life  community life  recreation and leisure         low   Clinical Presentation stable and uncomplicated low   Decision Making/ Complexity Score: low       GOALS:  Short Term Goals: In 4 weeks   1. Patient to be educated on HEP.  2. Patient to improve score on the FOTO to progress toward goal of returning to functional and recreational activities.  3. Patient to have pain less than 4/10 at worst in order to improve QOL.  4. Patient to improve strength by 1/2 MMT grade in L UE without pain to improve use of L UE for functional activities.  5. Patient will demonstrate improved scapular mobility to improve shoulder mechanics and decrease pain during ADLs.  6. Patient to be educated on postural and body mechanics awareness.     Long Term Goals: In 8 weeks  1. Patient will demonstrate independence in HEP with minimal cueing in order to ensure maintenance of gains.  2. Patient to improve score on FOTO to 10/100 or less to achieve goal of returning to functional and recreational activities.  3. Patient to have decreased pain to 1/10 at worst in order to improve QOL.  4. Patient to improve B UE strength to goals stated above to improve use of B UE for functional activities.  5. Patient to demo increase L shoulder AROM to goals stated above to improve ability to use L UE for ADLs.  6. Patient to perform work and recreational activities including tumbling, stunting, and front and lateral weighted raises without increased symptoms.      PLAN   Plan of care Certification: 7/28/2020 to 10/26/2020.    Outpatient Physical Therapy 2 times weekly for 8 weeks to include any combination of " the following interventions: virtual visits, dry needling, modalities, electrical stimulation (IFC, Pre-Mod, Attended with Functional Dry Needling), Cervical/Lumbar Traction, Gait Training, Manual Therapy, Moist Heat/ Ice, Neuromuscular Re-ed, Patient Education, Self Care, Therapeutic Activites, Therapeutic Exercise and Ultrasound     Thank you for this referral.    These services are reasonable and necessary for the conditions set forth above while under my care.    Randee Hernandez, SPT

## 2020-08-03 ENCOUNTER — OFFICE VISIT (OUTPATIENT)
Dept: PRIMARY CARE CLINIC | Facility: CLINIC | Age: 25
End: 2020-08-03
Payer: COMMERCIAL

## 2020-08-03 VITALS
BODY MASS INDEX: 27.4 KG/M2 | OXYGEN SATURATION: 96 % | WEIGHT: 149.81 LBS | DIASTOLIC BLOOD PRESSURE: 84 MMHG | SYSTOLIC BLOOD PRESSURE: 130 MMHG | TEMPERATURE: 98 F | HEART RATE: 80 BPM

## 2020-08-03 DIAGNOSIS — R10.31 RIGHT LOWER QUADRANT PAIN: Primary | ICD-10-CM

## 2020-08-03 DIAGNOSIS — N30.00 ACUTE CYSTITIS WITHOUT HEMATURIA: ICD-10-CM

## 2020-08-03 LAB
B-HCG UR QL: NEGATIVE
BILIRUB UR QL STRIP: NEGATIVE
CTP QC/QA: YES
GLUCOSE UR QL STRIP: NEGATIVE
KETONES UR QL STRIP: NEGATIVE
LEUKOCYTE ESTERASE UR QL STRIP: POSITIVE
PH, POC UA: 8
POC BLOOD, URINE: NEGATIVE
POC NITRATES, URINE: NEGATIVE
PROT UR QL STRIP: POSITIVE
SP GR UR STRIP: 1.01 (ref 1–1.03)
UROBILINOGEN UR STRIP-ACNC: NORMAL (ref 0.1–1.1)

## 2020-08-03 PROCEDURE — 3008F PR BODY MASS INDEX (BMI) DOCUMENTED: ICD-10-PCS | Mod: CPTII,S$GLB,, | Performed by: PHYSICIAN ASSISTANT

## 2020-08-03 PROCEDURE — 99999 PR PBB SHADOW E&M-EST. PATIENT-LVL IV: ICD-10-PCS | Mod: PBBFAC,,, | Performed by: PHYSICIAN ASSISTANT

## 2020-08-03 PROCEDURE — 99999 PR PBB SHADOW E&M-EST. PATIENT-LVL IV: CPT | Mod: PBBFAC,,, | Performed by: PHYSICIAN ASSISTANT

## 2020-08-03 PROCEDURE — 81003 URINALYSIS AUTO W/O SCOPE: CPT | Mod: QW,S$GLB,, | Performed by: PHYSICIAN ASSISTANT

## 2020-08-03 PROCEDURE — 99214 PR OFFICE/OUTPT VISIT, EST, LEVL IV, 30-39 MIN: ICD-10-PCS | Mod: 25,S$GLB,, | Performed by: PHYSICIAN ASSISTANT

## 2020-08-03 PROCEDURE — 99214 OFFICE O/P EST MOD 30 MIN: CPT | Mod: 25,S$GLB,, | Performed by: PHYSICIAN ASSISTANT

## 2020-08-03 PROCEDURE — 87086 URINE CULTURE/COLONY COUNT: CPT

## 2020-08-03 PROCEDURE — 81003 POCT URINALYSIS, DIPSTICK, AUTOMATED, W/O SCOPE: ICD-10-PCS | Mod: QW,S$GLB,, | Performed by: PHYSICIAN ASSISTANT

## 2020-08-03 PROCEDURE — 3008F BODY MASS INDEX DOCD: CPT | Mod: CPTII,S$GLB,, | Performed by: PHYSICIAN ASSISTANT

## 2020-08-03 RX ORDER — CEPHALEXIN 500 MG/1
500 CAPSULE ORAL EVERY 12 HOURS
Qty: 14 CAPSULE | Refills: 0 | Status: SHIPPED | OUTPATIENT
Start: 2020-08-03 | End: 2020-08-10

## 2020-08-03 NOTE — PATIENT INSTRUCTIONS
iburprofen 600 mg for pain   Tramadol for breakthrough pain   Take antibiotics as prescribed   Pelvis ultrasound later this week if not improving   ED for fever despite antibiotic, inability to hold down liquids, worsening pain, or other worrisome symptoms

## 2020-08-03 NOTE — PROGRESS NOTES
Subjective:      Patient ID: Swati Durán is a 24 y.o. female.    Chief Complaint: Flank Pain (Right side Pain rate 6/10)    Leeanna is a 24 year old female who presents to clinic with recurrent RLQ/pelvic pain.  The current episode began about 2 am.  The abdominal pain felt like a sharp, shooting pain.  The pain comes and goes.  When it comes on, it is typically there for an hour or so.  Sometimes it radiates to the left lower quadrant and sometimes the pain wraps around to her back.  She denies, n/v/d.  She admits fever this (temp 100.3) when she checked at temperature at work this morning.  She denies anorexia, and would eat at this time if offered her favorite foods.      Last period was July 8th.  She is due for her period in the next couple of days (28 day cycle).  She admits the pain typically comes on prior to period when thinking about it.      No new sexual partners.  Sexually active with one partner in a committed relationship.  Denies new vaginal discharge.      Review of Systems   Constitutional: Positive for fever. Negative for chills, diaphoresis and fatigue.   Gastrointestinal: Negative for diarrhea, nausea and vomiting.        Recurrent pelvic, RLQ abdominal pain prior to period    Genitourinary: Negative for dysuria, frequency, pelvic pain and urgency.   Skin: Negative for rash.   Neurological: Negative for weakness and numbness.       Objective:   /84   Pulse 80   Temp 98.3 °F (36.8 °C)   Wt 67.9 kg (149 lb 12.8 oz)   LMP 07/08/2020 (Exact Date)   SpO2 96%   BMI 27.40 kg/m²   Physical Exam  Constitutional:       General: She is not in acute distress.     Appearance: Normal appearance. She is well-developed. She is not ill-appearing, toxic-appearing or diaphoretic.   HENT:      Head: Normocephalic and atraumatic.   Pulmonary:      Effort: Pulmonary effort is normal. No respiratory distress.   Abdominal:      General: Bowel sounds are normal. There is no distension.       Palpations: Abdomen is soft. Abdomen is not rigid.      Tenderness: There is abdominal tenderness (tenderness in Right pelvic region to deep palpation, no guarding, belly soft, nondistended ). There is no guarding or rebound. Negative signs include Le's sign and McBurney's sign.   Skin:     General: Skin is warm and dry.   Neurological:      Mental Status: She is alert and oriented to person, place, and time.   Psychiatric:         Mood and Affect: Mood normal.         Behavior: Behavior normal.       Assessment:      1. Right lower quadrant pain    2. Acute cystitis without hematuria       Plan:   Right lower quadrant pain  -     POCT Urinalysis, Dipstick, Automated, W/O Scope  -     Urine culture; Future; Expected date: 08/03/2020  -     Cancel: Pregnancy, urine rapid  -     US Pelvis Complete Non OB; Future; Expected date: 08/03/2020  -     POCT urine pregnancy    Acute cystitis without hematuria  -     cephALEXin (KEFLEX) 500 MG capsule; Take 1 capsule (500 mg total) by mouth every 12 (twelve) hours. for 7 days  Dispense: 14 capsule; Refill: 0      RLQ Pain    -  Urinalysis with leukocytes - given fever, will treat for possible UTI and await urine culture    -  UPT negative    -  If pain does not improve in the next few days, then will schedule a obtain pelvic US for later this week    -  ED precautions given if pain worsens in meantime, if develops n/v and is unable to hold down liquids, fever despite abx, or other worrisome symptoms     Discussed patient with Dr. Fontana who agrees with plan given     Naty Morgan PA-C   Physician Assistant   [facility]

## 2020-08-03 NOTE — LETTER
August 4, 2020    Swati Durán  [address]  [place] [address]         [facility]  [address] [facility] [address]  Phone: [phone]  Fax: [phone] August 4, 2020     Patient: Swati Durán   YOB: 1995   Date of Visit: 8/3/2020       To Whom It May Concern:    It is my medical opinion that Swati Durán may return to work on 8/5/2020.    If you have any questions or concerns, please don't hesitate to call.    Sincerely,    Naty Morgan PA-C

## 2020-08-05 LAB — BACTERIA UR CULT: NORMAL

## 2020-08-06 ENCOUNTER — CLINICAL SUPPORT (OUTPATIENT)
Dept: REHABILITATION | Facility: HOSPITAL | Age: 25
End: 2020-08-06
Payer: COMMERCIAL

## 2020-08-06 DIAGNOSIS — R53.1 DECREASED STRENGTH: ICD-10-CM

## 2020-08-06 DIAGNOSIS — M25.512 LEFT SHOULDER PAIN, UNSPECIFIED CHRONICITY: ICD-10-CM

## 2020-08-06 PROCEDURE — 97110 THERAPEUTIC EXERCISES: CPT

## 2020-08-06 PROCEDURE — 97140 MANUAL THERAPY 1/> REGIONS: CPT

## 2020-08-06 NOTE — PROGRESS NOTES
Physical Therapy Treatment Note     Name: Swati Swann Leeanna  Clinic Number: [id number]    Therapy Diagnosis:   Encounter Diagnoses   Name Primary?    Decreased strength     Left shoulder pain, unspecified chronicity        Physician: Vianey Zamora MD    Visit Date: 8/6/2020    Physician Orders: PT Eval and Treat  Medical Diagnosis from Referral: chronic left shoulder pain  Evaluation Date: 7/28/2020  Authorization Period Expiration: 07/19/2021  Plan of Care Expiration: 10/26/2020  Visit # / Visits authorized: 2/20       Time In: 11:00 am  Time Out: 11:50 am  Total Billable Time: 48 minutes    Precautions: Standard and hypermobility    Subjective     Pt reports: increased fatigue after today's treatment.  She requires verbal and manual cues to isolate scapular stability with all therex.  No pain with today's treatment.    She was compliant with home exercise program.  Response to previous treatment: None  Functional change: None    Pain: 3/10  Location: right shoulder, anterior and posterior aspects and within the joint    Objective     Swati received therapeutic exercises to develop strength, endurance and core stabilization for 28 minutes including:    ABC's 2x/ea  Serratus punch, 10x 5# KB bottoms up  Prone LT isometrics 2' 10s  Prone scap retraction 2' 10s  spidermans small circles CCW/CW green theraband 10x  ina V B no abd 10x B  Plank on wall - elbows 15s x3  Quadruped with core activation 1', 5s hold  Quadruped with leg slide and lower trap activation 3x/ea    Swati received the following manual therapy techniques: Myofacial release, Soft tissue Mobilization and manual releases were applied to the: lateral scapular musculature for 15 minutes    Home Exercises Provided and Patient Education Provided     Education provided:   - HEP, scapular stability    Written Home Exercises Provided: yes.  Exercises were reviewed and Swati was able to demonstrate them prior to the end of the session.   Swati demonstrated good  understanding of the education provided.     See EMR under Patient Instructions for exercises provided 8/6/2020.    Assessment     Good tolerance to all treatment, difficulty with scapular stability and correct positioning with scapula, patient tends to protract and elevate with all therex/stability.Shaking and muscle fatigue noted with all therex when performed correctly.    Swati is progressing well towards her goals.   Pt prognosis is Good.     Pt will continue to benefit from skilled outpatient physical therapy to address the deficits listed in the problem list box on initial evaluation, provide pt/family education and to maximize pt's level of independence in the home and community environment.     Pt's spiritual, cultural and educational needs considered and pt agreeable to plan of care and goals.     Anticipated barriers to physical therapy: history of repetitive dislocations    Goals:   Short Term Goals: In 4 weeks   1. Patient to be educated on HEP.  2. Patient to improve score on the FOTO to progress toward goal of returning to functional and recreational activities.  3. Patient to have pain less than 4/10 at worst in order to improve QOL.  4. Patient to improve strength by 1/2 MMT grade in L UE without pain to improve use of L UE for functional activities.  5. Patient will demonstrate improved scapular mobility to improve shoulder mechanics and decrease pain during ADLs.  6. Patient to be educated on postural and body mechanics awareness.     Long Term Goals: In 8 weeks  1. Patient will demonstrate independence in HEP with minimal cueing in order to ensure maintenance of gains.  2. Patient to improve score on FOTO to 10/100 or less to achieve goal of returning to functional and recreational activities.  3. Patient to have decreased pain to 1/10 at worst in order to improve QOL.  4. Patient to improve B UE strength to goals stated above to improve use of B UE for functional  activities.  5. Patient to demo increase L shoulder AROM to goals stated above to improve ability to use L UE for ADLs.  6. Patient to perform work and recreational activities including tumbling, stunting, and front and lateral weighted raises without increased symptoms.         Plan        Plan of care Certification: 7/28/2020 to 10/26/2020.     Outpatient Physical Therapy 2 times weekly for 8 weeks to include any combination of the following interventions: virtual visits, dry needling, modalities, electrical stimulation (IFC, Pre-Mod, Attended with Functional Dry Needling), Cervical/Lumbar Traction, Gait Training, Manual Therapy, Moist Heat/ Ice, Neuromuscular Re-ed, Patient Education, Self Care, Therapeutic Activites, Therapeutic Exercise and Ultrasound     Monitor response to today's treatment and progress with PT POC as indicated and tolerated.    Ricarda Quintero, PT

## 2020-08-17 ENCOUNTER — CLINICAL SUPPORT (OUTPATIENT)
Dept: REHABILITATION | Facility: HOSPITAL | Age: 25
End: 2020-08-17
Payer: COMMERCIAL

## 2020-08-17 ENCOUNTER — PATIENT MESSAGE (OUTPATIENT)
Dept: ORTHOPEDICS | Facility: CLINIC | Age: 25
End: 2020-08-17

## 2020-08-17 DIAGNOSIS — G89.29 CHRONIC LEFT SHOULDER PAIN: ICD-10-CM

## 2020-08-17 DIAGNOSIS — R53.1 DECREASED STRENGTH: ICD-10-CM

## 2020-08-17 DIAGNOSIS — M25.512 CHRONIC LEFT SHOULDER PAIN: ICD-10-CM

## 2020-08-17 PROCEDURE — 97140 MANUAL THERAPY 1/> REGIONS: CPT

## 2020-08-17 PROCEDURE — 97110 THERAPEUTIC EXERCISES: CPT

## 2020-08-17 NOTE — PROGRESS NOTES
Physical Therapy Treatment Note     Name: Swati Durán  Clinic Number: [id number]    Therapy Diagnosis:   Encounter Diagnoses   Name Primary?    Decreased strength     Chronic left shoulder pain        Physician: Vianey Zamora MD    Visit Date: 8/17/2020    Physician Orders: PT Eval and Treat  Medical Diagnosis from Referral: chronic left shoulder pain  Evaluation Date: 7/28/2020  Authorization Period Expiration: 07/19/2021  Plan of Care Expiration: 10/26/2020  Visit # / Visits authorized: 3/20     PROGRESS NOTE: 8/28/2020    Time In: 11:00 am  Time Out: 11:50 am  Total Billable Time: 48 minutes    Precautions: Standard and hypermobility    Subjective     Pt reports: was unable to attend last week due to changes in work schedule.  She reports that she had a really long week last week and has noticed that her shoulder hurts more if she tries to lift her purse or her backpack with her left arm.  Longer hours last week and just more tired.    She was compliant with home exercise program.  Response to previous treatment: None  Functional change: None    Pain: 4/10  Location: right shoulder, anterior and posterior aspects and within the joint    Objective     Swati received therapeutic exercises to develop strength, endurance and core stabilization for 30 minutes including:    Shoulder flexion 10x with core activated  ABC's 1x/ea 5# KB bottoms up, bilateral  Serratus punch, 8x 5# KB bottoms up, bilateral  Prone LT isometrics 2' 10s  Prone scap retraction 2' 10s  spidermans small circles CCW/CW green theraband 3x/ea 5 sets  ina V B, no abd,  10x2 B  Quadruped with core activation 1', 5s hold    Defer:  Quadruped with leg slide and lower trap activation 3x/ea  Plank on wall - elbows 15s x3  Subscapularis isolation    Swati received the following manual therapy techniques: Myofacial release, Soft tissue Mobilization and manual releases were applied to the: lateral/medial scapular musculature and  lats/infraspinatus, upper trapezial/levator scapular musculature for 10 minutes    Home Exercises Provided and Patient Education Provided     Education provided:   - HEP, scapular stability    Written Home Exercises Provided: yes.  Exercises were reviewed and Swati was able to demonstrate them prior to the end of the session.  Swati demonstrated good  understanding of the education provided.     See EMR under Patient Instructions for exercises provided 8/6/2020.    Assessment     Good tolerance to all treatment, continues to have increased difficulty with scapular stability and quickly fatigues with therex - requiring verbal and tactile cues.  She was able to progress with [place] but reports she is too sore to do remaining quadruped exercise or planks on wall.      Swati is progressing well towards her goals.   Pt prognosis is Good.     Pt will continue to benefit from skilled outpatient physical therapy to address the deficits listed in the problem list box on initial evaluation, provide pt/family education and to maximize pt's level of independence in the home and community environment.     Pt's spiritual, cultural and educational needs considered and pt agreeable to plan of care and goals.     Anticipated barriers to physical therapy: history of repetitive dislocations    Goals:  Short Term Goals: In 4 weeks   1. Patient to be educated on HEP.  2. Patient to improve score on the FOTO to progress toward goal of returning to functional and recreational activities.  3. Patient to have pain less than 4/10 at worst in order to improve QOL.  4. Patient to improve strength by 1/2 MMT grade in L UE without pain to improve use of L UE for functional activities.  5. Patient will demonstrate improved scapular mobility to improve shoulder mechanics and decrease pain during ADLs.  6. Patient to be educated on postural and body mechanics awareness.     Long Term Goals: In 8 weeks  1. Patient will demonstrate  independence in HEP with minimal cueing in order to ensure maintenance of gains.  2. Patient to improve score on FOTO to 10/100 or less to achieve goal of returning to functional and recreational activities.  3. Patient to have decreased pain to 1/10 at worst in order to improve QOL.  4. Patient to improve B UE strength to goals stated above to improve use of B UE for functional activities.  5. Patient to demo increase L shoulder AROM to goals stated above to improve ability to use L UE for ADLs.  6. Patient to perform work and recreational activities including tumbling, stunting, and front and lateral weighted raises without increased symptoms.         Plan     Plan of care Certification: 7/28/2020 to 10/26/2020.     Outpatient Physical Therapy 2 times weekly for 8 weeks to include any combination of the following interventions: virtual visits, dry needling, modalities, electrical stimulation (IFC, Pre-Mod, Attended with Functional Dry Needling), Cervical/Lumbar Traction, Gait Training, Manual Therapy, Moist Heat/ Ice, Neuromuscular Re-ed, Patient Education, Self Care, Therapeutic Activites, Therapeutic Exercise and Ultrasound     Monitor response to today's treatment and progress with PT POC as indicated and tolerated.    Ricarda Quintero, PT

## 2020-08-17 NOTE — TELEPHONE ENCOUNTER
Returned patient's call. Had previously asked MA to inform patient of relatively normal MRI results except for small cartilage defect, but patient denies being informed of results. I explained results to her and encouraged ongoing therapy.

## 2020-08-20 ENCOUNTER — CLINICAL SUPPORT (OUTPATIENT)
Dept: REHABILITATION | Facility: HOSPITAL | Age: 25
End: 2020-08-20
Payer: COMMERCIAL

## 2020-08-20 DIAGNOSIS — M25.512 CHRONIC LEFT SHOULDER PAIN: ICD-10-CM

## 2020-08-20 DIAGNOSIS — G89.29 CHRONIC LEFT SHOULDER PAIN: ICD-10-CM

## 2020-08-20 DIAGNOSIS — R53.1 DECREASED STRENGTH: ICD-10-CM

## 2020-08-20 PROCEDURE — 97110 THERAPEUTIC EXERCISES: CPT

## 2020-08-20 PROCEDURE — 97140 MANUAL THERAPY 1/> REGIONS: CPT

## 2020-08-20 NOTE — PROGRESS NOTES
Physical Therapy Treatment Note     Name: Swati Durán  Clinic Number: [id number]    Therapy Diagnosis:   Encounter Diagnoses   Name Primary?    Decreased strength     Chronic left shoulder pain        Physician: Vianey Zamora MD    Visit Date: 8/20/2020    Physician Orders: PT Eval and Treat  Medical Diagnosis from Referral: chronic left shoulder pain  Evaluation Date: 7/28/2020  Authorization Period Expiration: 07/19/2021  Plan of Care Expiration: 10/26/2020  Visit # / Visits authorized: 3/20     PROGRESS NOTE: 8/28/2020    Time In: 11:10 am  Time Out: 11:50 am  Total Billable Time: 38 minutes    Precautions: Standard and hypermobility    Subjective     Pt reports: Did well after last treatment she is still sore from last treatment session.    She was compliant with home exercise program.  Response to previous treatment: None  Functional change: None    Pain: 4/10  Location: right shoulder, anterior and posterior aspects and within the joint    Objective     Swati received therapeutic exercises to develop strength, endurance and core stabilization for 28 minutes including:    Shoulder alternating flexion 10x/ea with core activated  ABC's 1x/ea 5# KB bottoms up, bilateral  Serratus punch, 8x 5# KB bottoms up, bilateral  Prone LT isometrics 2' 10s  Prone scap retraction 2' 10s  spidermans small circles CCW/CW green theraband 3x/ea 5 sets  ina V B, no abd,  10x2 B yellow theraband  Quadruped with core activation 3x, 5s hold  Quadruped ball push with UE reach 2x/each arm (unweighting only) 2 sets    Defer:  Quadruped with leg slide and lower trap activation 3x/ea  Plank on wall - elbows 15s x3  Subscapularis isolation    Swati received the following manual therapy techniques: Myofacial release, Soft tissue Mobilization and manual releases were applied to the: lateral/medial scapular musculature and lats/infraspinatus, upper trapezial/levator scapular musculature for 10 minutes    Home Exercises  Provided and Patient Education Provided     Education provided:   - HEP, scapular stability    Written Home Exercises Provided: Patient instructed to cont prior HEP.  Exercises were reviewed and Swati was able to demonstrate them prior to the end of the session.  Swati demonstrated good  understanding of the education provided.     See EMR under Patient Instructions for exercises provided prior visit.    Assessment     Good tolerance to all treatment, she continues to quickly fatigue with therex and has difficulty with quadruped stability.  She continues to require monitoring and cues to maintain scapular stability during shoulder strengthening.      Swati is progressing well towards her goals.   Pt prognosis is Good.     Pt will continue to benefit from skilled outpatient physical therapy to address the deficits listed in the problem list box on initial evaluation, provide pt/family education and to maximize pt's level of independence in the home and community environment.     Pt's spiritual, cultural and educational needs considered and pt agreeable to plan of care and goals.     Anticipated barriers to physical therapy: history of repetitive dislocations    Goals:  Short Term Goals: In 4 weeks   1. Patient to be educated on HEP.  2. Patient to improve score on the FOTO to progress toward goal of returning to functional and recreational activities.  3. Patient to have pain less than 4/10 at worst in order to improve QOL.  4. Patient to improve strength by 1/2 MMT grade in L UE without pain to improve use of L UE for functional activities.  5. Patient will demonstrate improved scapular mobility to improve shoulder mechanics and decrease pain during ADLs.  6. Patient to be educated on postural and body mechanics awareness.     Long Term Goals: In 8 weeks  1. Patient will demonstrate independence in HEP with minimal cueing in order to ensure maintenance of gains.  2. Patient to improve score on FOTO to 10/100  or less to achieve goal of returning to functional and recreational activities.  3. Patient to have decreased pain to 1/10 at worst in order to improve QOL.  4. Patient to improve B UE strength to goals stated above to improve use of B UE for functional activities.  5. Patient to demo increase L shoulder AROM to goals stated above to improve ability to use L UE for ADLs.  6. Patient to perform work and recreational activities including tumbling, stunting, and front and lateral weighted raises without increased symptoms.         Plan     Plan of care Certification: 7/28/2020 to 10/26/2020.     Outpatient Physical Therapy 2 times weekly for 8 weeks to include any combination of the following interventions: virtual visits, dry needling, modalities, electrical stimulation (IFC, Pre-Mod, Attended with Functional Dry Needling), Cervical/Lumbar Traction, Gait Training, Manual Therapy, Moist Heat/ Ice, Neuromuscular Re-ed, Patient Education, Self Care, Therapeutic Activites, Therapeutic Exercise and Ultrasound     Monitor response to today's treatment and progress with PT POC as indicated and tolerated.    Ricarda Quintero, PT

## 2020-08-24 ENCOUNTER — CLINICAL SUPPORT (OUTPATIENT)
Dept: REHABILITATION | Facility: HOSPITAL | Age: 25
End: 2020-08-24
Payer: COMMERCIAL

## 2020-08-24 DIAGNOSIS — R53.1 DECREASED STRENGTH: ICD-10-CM

## 2020-08-24 DIAGNOSIS — M25.512 CHRONIC LEFT SHOULDER PAIN: ICD-10-CM

## 2020-08-24 DIAGNOSIS — G89.29 CHRONIC LEFT SHOULDER PAIN: ICD-10-CM

## 2020-08-24 PROCEDURE — 97110 THERAPEUTIC EXERCISES: CPT

## 2020-08-24 PROCEDURE — 97140 MANUAL THERAPY 1/> REGIONS: CPT

## 2020-08-27 ENCOUNTER — PATIENT OUTREACH (OUTPATIENT)
Dept: ADMINISTRATIVE | Facility: OTHER | Age: 25
End: 2020-08-27

## 2020-08-28 ENCOUNTER — OFFICE VISIT (OUTPATIENT)
Dept: ORTHOPEDICS | Facility: CLINIC | Age: 25
End: 2020-08-28
Payer: COMMERCIAL

## 2020-08-28 VITALS
SYSTOLIC BLOOD PRESSURE: 110 MMHG | HEIGHT: 62 IN | WEIGHT: 149 LBS | HEART RATE: 68 BPM | BODY MASS INDEX: 27.42 KG/M2 | DIASTOLIC BLOOD PRESSURE: 70 MMHG

## 2020-08-28 DIAGNOSIS — M25.512 CHRONIC LEFT SHOULDER PAIN: ICD-10-CM

## 2020-08-28 DIAGNOSIS — M25.512 LEFT SHOULDER PAIN, UNSPECIFIED CHRONICITY: Primary | ICD-10-CM

## 2020-08-28 DIAGNOSIS — G89.29 CHRONIC LEFT SHOULDER PAIN: ICD-10-CM

## 2020-08-28 PROCEDURE — 99214 OFFICE O/P EST MOD 30 MIN: CPT | Mod: S$GLB,,, | Performed by: PHYSICAL MEDICINE & REHABILITATION

## 2020-08-28 PROCEDURE — 3008F PR BODY MASS INDEX (BMI) DOCUMENTED: ICD-10-PCS | Mod: CPTII,S$GLB,, | Performed by: PHYSICAL MEDICINE & REHABILITATION

## 2020-08-28 PROCEDURE — 99999 PR PBB SHADOW E&M-EST. PATIENT-LVL III: CPT | Mod: PBBFAC,,, | Performed by: PHYSICAL MEDICINE & REHABILITATION

## 2020-08-28 PROCEDURE — 99214 PR OFFICE/OUTPT VISIT, EST, LEVL IV, 30-39 MIN: ICD-10-PCS | Mod: S$GLB,,, | Performed by: PHYSICAL MEDICINE & REHABILITATION

## 2020-08-28 PROCEDURE — 3008F BODY MASS INDEX DOCD: CPT | Mod: CPTII,S$GLB,, | Performed by: PHYSICAL MEDICINE & REHABILITATION

## 2020-08-28 PROCEDURE — 99999 PR PBB SHADOW E&M-EST. PATIENT-LVL III: ICD-10-PCS | Mod: PBBFAC,,, | Performed by: PHYSICAL MEDICINE & REHABILITATION

## 2020-08-28 RX ORDER — NAPROXEN 500 MG/1
500 TABLET ORAL 2 TIMES DAILY WITH MEALS
Qty: 60 TABLET | Refills: 0 | Status: SHIPPED | OUTPATIENT
Start: 2020-08-28 | End: 2021-10-14

## 2020-08-28 NOTE — PROGRESS NOTES
SPORTS MEDICINE / PM&R Follow Up Visit :    Referring Physician: No ref. provider found    Chief Complaint   Patient presents with    Left Shoulder - Follow-up       HPI: This is a 24 y.o.  female being seen in clinic today for evaluation of Follow-up of the Left Shoulder   Since last visit, the symptoms are improving somewhat.  She has been to therapy at [facility] with Ricarda and notes it is helping. Still with 6 out of 10 pain however. Still with some pain in both shoulders, but left is worse.        History obtained from patient.    Past family, medical, social, surgical history, and vital signs reviewed in chart.    Review of Systems   Constitutional: Negative for chills, fever and weight loss.   HENT: Negative for hearing loss and sore throat.    Eyes: Negative for blurred vision, photophobia and pain.   Respiratory: Negative for shortness of breath.    Cardiovascular: Negative for chest pain.   Gastrointestinal: Negative for abdominal pain.   Genitourinary: Negative for dysuria.   Skin: Negative for rash.   Neurological: Negative for tingling and headaches.   Endo/Heme/Allergies: Does not bruise/bleed easily.   Psychiatric/Behavioral: Negative for depression.       General    Nursing note and vitals reviewed.  Constitutional: She is oriented to person, place, and time. She appears well-developed and well-nourished.   HENT:   Head: Normocephalic and atraumatic.   Eyes: Conjunctivae and EOM are normal. Pupils are equal, round, and reactive to light.   Neck: Neck supple.   Cardiovascular: Intact distal pulses.    Pulmonary/Chest: Effort normal. No respiratory distress.   Abdominal: She exhibits no distension.   Neurological: She is alert and oriented to person, place, and time. She has normal reflexes.   Psychiatric: She has a normal mood and affect.         Right Shoulder Exam   Right shoulder exam is normal.    Inspection/Observation   Scars: absent  Scapular Winging: absent  Scapular Dyskinesia:  negative    Range of Motion   Active abduction: normal   Passive abduction: normal   Extension: normal   Forward Flexion: normal   Adduction: normal  External Rotation 0 degrees: normal   Internal rotation 0 degrees: normal     Muscle Strength   The patient has normal right shoulder strength.    Tests & Signs   Apprehension: negative  Henderson test: negative  Impingement: negative  Sulcus: absent  Speed's Test: negative  Jerk Test: negative    Other   Sensation: normal    Left Shoulder Exam     Inspection/Observation   Scars: absent  Scapular Winging: absent  Scapular Dyskinesia: positive    Tenderness   The patient is tender to palpation of the greater tuberosity.    Range of Motion   Active abduction:  150 abnormal   Passive abduction:  160 abnormal   Extension: normal   Forward Flexion: abnormal   Adduction: normal  External Rotation 0 degrees: normal   Internal rotation 0 degrees: abnormal     Muscle Strength   The patient has normal left shoulder strength.    Tests & Signs   Apprehension: positive (pain)  Henderson test: positive  Impingement: negative  Sulcus: absent  Rotator Cuff Painful Arc/Range: mild  Speed's Test: positive  Jerk Test: negative    Other   Sensation: normal       Reflexes     Left Side  Biceps:  2+  Brachioradialis:  2+  Left Hale's Sign:  Absent    Right Side   Biceps:  2+  Brachioradialis:  2+  Right Hale's Sign:  absent    Vascular Exam     Right Pulses      Radial:                    2+      Left Pulses      Radial:                    2+      Capillary Refill  Right Hand: normal capillary refill  Left Hand: normal capillary refill        IMPRESSION/PLAN: This is a 24 y.o.  female with:    Left shoulder pain, unspecified chronicity  -     naproxen (NAPROSYN) 500 MG tablet; Take 1 tablet (500 mg total) by mouth 2 (two) times daily with meals.  Dispense: 60 tablet; Refill: 0    Chronic left shoulder pain  -     naproxen (NAPROSYN) 500 MG tablet; Take 1 tablet (500 mg total) by mouth 2  (two) times daily with meals.  Dispense: 60 tablet; Refill: 0        The findings were discussed with Swati in detail. We reviewed her MRI showing a small area of cartilage loss on the glenoid, but no evidence of RTC tear or labral tear. Had previously discussed this with our surgical team and advised that surgery wouldn't really help in this situation. We discussed risks vs benefits of injections and decided to hold off on that. Recommend ongoing physical therapy and if not significantly better in 6 weeks will refer to Dr. Gill.  She was provided with this plan in writing. All of her questions were answered. She will follow up with me in 6 weeks.     Vianey Zamora M.D.  Sports Medicine

## 2020-09-02 ENCOUNTER — OFFICE VISIT (OUTPATIENT)
Dept: INTERNAL MEDICINE | Facility: CLINIC | Age: 25
End: 2020-09-02
Payer: COMMERCIAL

## 2020-09-02 VITALS
DIASTOLIC BLOOD PRESSURE: 68 MMHG | HEART RATE: 77 BPM | WEIGHT: 151.69 LBS | SYSTOLIC BLOOD PRESSURE: 116 MMHG | BODY MASS INDEX: 27.92 KG/M2 | OXYGEN SATURATION: 98 % | TEMPERATURE: 98 F | HEIGHT: 62 IN

## 2020-09-02 DIAGNOSIS — R21 RASH AND NONSPECIFIC SKIN ERUPTION: Primary | ICD-10-CM

## 2020-09-02 PROCEDURE — 99999 PR PBB SHADOW E&M-EST. PATIENT-LVL III: ICD-10-PCS | Mod: PBBFAC,,, | Performed by: NURSE PRACTITIONER

## 2020-09-02 PROCEDURE — 99213 PR OFFICE/OUTPT VISIT, EST, LEVL III, 20-29 MIN: ICD-10-PCS | Mod: S$GLB,,, | Performed by: NURSE PRACTITIONER

## 2020-09-02 PROCEDURE — 99213 OFFICE O/P EST LOW 20 MIN: CPT | Mod: S$GLB,,, | Performed by: NURSE PRACTITIONER

## 2020-09-02 PROCEDURE — 3008F BODY MASS INDEX DOCD: CPT | Mod: CPTII,S$GLB,, | Performed by: NURSE PRACTITIONER

## 2020-09-02 PROCEDURE — 3008F PR BODY MASS INDEX (BMI) DOCUMENTED: ICD-10-PCS | Mod: CPTII,S$GLB,, | Performed by: NURSE PRACTITIONER

## 2020-09-02 PROCEDURE — 99999 PR PBB SHADOW E&M-EST. PATIENT-LVL III: CPT | Mod: PBBFAC,,, | Performed by: NURSE PRACTITIONER

## 2020-09-02 RX ORDER — NYSTATIN AND TRIAMCINOLONE ACETONIDE 100000; 1 [USP'U]/G; MG/G
CREAM TOPICAL 2 TIMES DAILY
Qty: 15 G | Refills: 0 | Status: SHIPPED | OUTPATIENT
Start: 2020-09-02 | End: 2020-10-14

## 2020-09-02 NOTE — PROGRESS NOTES
Subjective:       Patient ID: Swati Durán is a 24 y.o. female.    Chief Complaint: Rash    HPI    Pt here with c/o rash to right arm x 4 days. Slightly itchy. Has been using OTC triple abx ointment  No worsening noted      History reviewed. No pertinent past medical history.  History reviewed. No pertinent surgical history.  History reviewed. No pertinent surgical history.  Social History     Socioeconomic History    Marital status: Single     Spouse name: Not on file    Number of children: Not on file    Years of education: Not on file    Highest education level: Not on file   Occupational History    Not on file   Social Needs    Financial resource strain: Not very hard    Food insecurity     Worry: Never true     Inability: Never true    Transportation needs     Medical: No     Non-medical: No   Tobacco Use    Smoking status: Never Smoker    Smokeless tobacco: Never Used   Substance and Sexual Activity    Alcohol use: No     Alcohol/week: 0.0 standard drinks     Frequency: Monthly or less     Drinks per session: 1 or 2     Binge frequency: Never    Drug use: No    Sexual activity: Not on file   Lifestyle    Physical activity     Days per week: 6 days     Minutes per session: 70 min    Stress: Not at all   Relationships    Social connections     Talks on phone: Three times a week     Gets together: Once a week     Attends [employer] witness service: Not on file     Active member of club or organization: Yes     Attends meetings of clubs or organizations: More than 4 times per year     Relationship status: Never    Other Topics Concern    Not on file   Social History Narrative    Not on file     Review of patient's allergies indicates:  No Known Allergies  Current Outpatient Medications   Medication Sig    naproxen (NAPROSYN) 500 MG tablet Take 1 tablet (500 mg total) by mouth 2 (two) times daily with meals.    cyclobenzaprine (FLEXERIL) 10 MG tablet 1/2 to one tab po qhs prn muscle  spasm (Patient not taking: Reported on 9/2/2020)    meloxicam (MOBIC) 15 MG tablet Take 1 tablet (15 mg total) by mouth once daily. (Patient not taking: Reported on 9/2/2020)    methylPREDNISolone (MEDROL, YANET,) 4 mg tablet use as directed (Patient not taking: Reported on 9/2/2020)    nystatin-triamcinolone (MYCOLOG II) cream Apply topically 2 (two) times daily. X 10-14 days     No current facility-administered medications for this visit.            Review of Systems   Constitutional: Negative for activity change, appetite change, chills, diaphoresis, fatigue, fever and unexpected weight change.   HENT: Negative for congestion, ear pain, postnasal drip, rhinorrhea, sinus pressure, sinus pain, sneezing, sore throat, tinnitus, trouble swallowing and voice change.    Eyes: Negative for photophobia, pain and visual disturbance.   Respiratory: Negative for cough, chest tightness, shortness of breath and wheezing.    Cardiovascular: Negative for chest pain, palpitations and leg swelling.   Gastrointestinal: Negative for abdominal distention, abdominal pain, constipation, diarrhea, nausea and vomiting.   Genitourinary: Negative for decreased urine volume, difficulty urinating, dysuria, flank pain, frequency, hematuria and urgency.   Musculoskeletal: Negative for arthralgias, back pain, joint swelling, neck pain and neck stiffness.   Skin: Positive for rash.   Allergic/Immunologic: Negative for immunocompromised state.   Neurological: Negative for dizziness, tremors, seizures, syncope, facial asymmetry, speech difficulty, weakness, light-headedness, numbness and headaches.   Hematological: Negative for adenopathy. Does not bruise/bleed easily.   Psychiatric/Behavioral: Negative for confusion and sleep disturbance.       Objective:      Physical Exam  Skin:               Assessment:     Vitals:    09/02/20 0848   BP: 116/68   Pulse: 77   Temp: 98 °F (36.7 °C)         1. Rash and nonspecific skin eruption        Plan:    Rash and nonspecific skin eruption  -     nystatin-triamcinolone (MYCOLOG II) cream; Apply topically 2 (two) times daily. X 10-14 days  Dispense: 15 g; Refill: 0        As above  Skin care and worsening symptoms discussed

## 2020-09-02 NOTE — PATIENT INSTRUCTIONS
Self-Care for Skin Rashes     Pat your skin dry. Do not rub.     When your skin reacts to a substance your body is sensitive to, it can cause a rash. You can treat most rashes at home by keeping the skin clean and dry. Many rashes may get better on their own within 2 to 3 days. You may need medical attention if your rash itches, drains, or hurts, particularly if the rash is getting worse.  What can cause a skin rash?  · Sun poisoning, caused by too much exposure to the sun  · An irritant or allergic reaction to a certain type of food, plant, or chemical, such as  shellfish, poison ivy, and or cleaning products  · An infection caused by a fungus (ringworm), virus (chickenpox), or bacteria (strep)  · Bites or infestation caused by insects or pests, such as ticks, lice, or mites  · Dry skin, which is often seen during the winter months and in older people  How can I control itching and skin damage?  · Take soothing lukewarm baths in a colloidal oatmeal product. You can buy this at the [employer].  · Do your best not to scratch. Clip fingernails short, especially in young children, to reduce skin damage if scratching does occur.  · Use moisturizing skin lotion instead of scratching your dry skin.  · Use sunscreen whenever going out into direct sun.  · Use only mild cleansing agents whenever possible.  · Wash with mild, nonirritating soap and warm water.  · Wear clothing that breathes, such as cotton shirts or canvas shoes.  · If fluid is seeping from the rash, cover it loosely with clean gauze to absorb the discharge.  · Many rashes are contagious. Prevent the rash from spreading to others by washing your hands often before or after touching others with any skin rash.  Use medicine  · Antihistamines such as diphenhydramine can help control itching. But use with caution because they can make you drowsy.  · Using over-the-counter hydrocortisone cream on small rashes may help reduce swelling and itching  · Most  over-the-counter antifungal medicines can treat athletes foot and many other fungal infections of the skin.  Check with your healthcare provider  Call your healthcare provider if:  · You were told that you have a fungal infection on your skin to make sure you have the correct type of medicine.  · You have questions or concerns about medicines or their side effects.     Call 911  Call 911 if either of these occur:  · Your tongue or lips start to swell  · You have difficulty breathing      Call your healthcare provider  Call your healthcare provider if any of these occur:  · Temperature of more than 101.0°F (38.3°C), or as directed  · Sore throat, a cough, or unusual fatigue  · Red, oozy, or painful rash gets worse. These are signs of infection.  · Rash covers your face, genitals, or most of your body  · Crusty sores or red rings that begin to spread  · You were exposed to someone who has a contagious rash, such as scabies or lice.  · Red bulls-eye rash with a white center (a sign of Lyme disease)  · You were told that you have resistant bacteria (MRSA) on your skin.   Date Last Reviewed: 5/12/2015  © 3111-2259 [employer]. [address] [place] [address], [place] [address]. All rights reserved. This information is not intended as a substitute for professional medical care. Always follow your healthcare professional's instructions.

## 2020-09-04 ENCOUNTER — CLINICAL SUPPORT (OUTPATIENT)
Dept: REHABILITATION | Facility: HOSPITAL | Age: 25
End: 2020-09-04
Payer: COMMERCIAL

## 2020-09-04 DIAGNOSIS — R53.1 DECREASED STRENGTH: ICD-10-CM

## 2020-09-04 DIAGNOSIS — G89.29 CHRONIC LEFT SHOULDER PAIN: ICD-10-CM

## 2020-09-04 DIAGNOSIS — M25.512 CHRONIC LEFT SHOULDER PAIN: ICD-10-CM

## 2020-09-04 PROCEDURE — 97110 THERAPEUTIC EXERCISES: CPT

## 2020-09-04 PROCEDURE — 97140 MANUAL THERAPY 1/> REGIONS: CPT

## 2020-09-04 NOTE — PROGRESS NOTES
Physical Therapy Treatment Note     Name: Swati Durán  Clinic Number: [id number]    Therapy Diagnosis:   Encounter Diagnoses   Name Primary?    Decreased strength     Chronic left shoulder pain        Physician: Vianey Zamora MD    Visit Date: 9/4/2020    Physician Orders: PT Eval and Treat  Medical Diagnosis from Referral: chronic left shoulder pain  Evaluation Date: 7/28/2020  Authorization Period Expiration: 07/19/2021  Plan of Care Expiration: 10/26/2020  Visit # / Visits authorized: 4/20 (total visits 5)     PROGRESS NOTE: 10/4/2020    Time In: 10:30 am  Time Out: 11:20 am  Total Billable Time: 46 minutes    Precautions: Standard and hypermobility    Subjective     Pt reports: Did well after last treatment she is not sore today.  She has been doing her HEP and she is still having difficulty with quadruped ex's, not painful but hard to do.    She was compliant with home exercise program.  Response to previous treatment: None  Functional change: None    Pain: NT/10  Location: right shoulder, anterior and posterior aspects and within the joint    Objective       STRENGTH:     U/E MMT Right  7/28/2020 Left  7/28/2020 Goal Right  (9/4)  Left   (9/4)   Cervical SB 4-/5 4-/5 5/5 B 4/5 4/5   Shoulder Elevation 4+/5 4+/5 5/5 B NT NT   Shoulder Flexion 4/5 4/5 5/5 B NT NT   Shoulder Extension 4+/5 4/5 5/5 B NT NT   Shoulder Abduction 4+/5 4/5 5/5 B 4+/5 4+/5   Shoulder IR 4/5 4-/5 5/5 B 4+/5 4+/5   Shoulder ER 4/5 4-/5 5/5 B 4+/5 4+/5   Elbow Flexion  4+/5 4/5 5/5 B 4+/5 4+/5   Elbow Extension 4+/5 4/5 5/5 B 4+/5 4/5   Serratus ant NT NT N/A NT NT   supraspinatus NT NT N/A NT NT      FOTO:      Swati received therapeutic exercises to develop strength, endurance and core stabilization for 36 minutes including:    ABC's 1x/ea 5# KB bottoms up, bilateral ( to V on L side)  Serratus punch, 8x 5# KB bottoms up, bilateral  Subscapularis isolation isometric 2min/ea  Prone LT isometrics 2' 10s  Prone scap  retraction 2' 10s  spidermans small circles CCW/CW yellow theraband 10x/ea B  ina V B, no abd,  10x2 B yellow theraband  Quadruped with core activation, 5s hold 2 reps of hold then rest, 4 sets   Wall plank on elbows 5s x5    Defer:  Quadruped with leg slide and lower trap activation 3x/ea  Plank on wall - elbows 15s x3  Plank on elbows/knees 5s x3  Quadruped ball push with UE reach 5x/each arm (unweighting only)  Extended arms plank  1x 5s    Swati received the following manual therapy techniques: Myofacial release, Soft tissue Mobilization and manual releases were applied to the: lateral/medial scapular musculature and lats/infraspinatus, pectoral minor, upper trapezial/levator scapular musculature for 10 minutes pre-treatment.    Home Exercises Provided and Patient Education Provided     Education provided:   - HEP, scapular stability    Written Home Exercises Provided: Patient instructed to cont prior HEP.  Exercises were reviewed and Swati was able to demonstrate them prior to the end of the session.  Swati demonstrated good  understanding of the education provided.     See EMR under Patient Instructions for exercises provided prior visit.    Assessment     Good tolerance to all treatment, she has continued difficulty with her stabilization but has not had any subluxation reports.  She continues to have difficulty with closed chain stability and does require verbal and manual cues to complete therex.      Swati is progressing well towards her goals.   Pt prognosis is Good.     Pt will continue to benefit from skilled outpatient physical therapy to address the deficits listed in the problem list box on initial evaluation, provide pt/family education and to maximize pt's level of independence in the home and community environment.     Pt's spiritual, cultural and educational needs considered and pt agreeable to plan of care and goals.     Anticipated barriers to physical therapy: history of  repetitive dislocations    Goals:  Short Term Goals: In 4 weeks   1. Patient to be educated on HEP. MET (9/4/2020)  2. Patient to improve score on the FOTO to progress toward goal of returning to functional and recreational activities.  Not MET (9/4/2020)  3. Patient to have pain less than 4/10 at worst in order to improve QOL.  MET (9/4/2020)  4. Patient to improve strength by 1/2 MMT grade in L UE without pain to improve use of L UE for functional activities.  MET (9/4/2020)  5. Patient will demonstrate improved scapular mobility to improve shoulder mechanics and decrease pain during ADLs. Progressing not MET (9/4/2020)  6. Patient to be educated on postural and body mechanics awareness.  MET (9/4/2020)     Long Term Goals: In 8 weeks  1. Patient will demonstrate independence in HEP with minimal cueing in order to ensure maintenance of gains.  MET (9/4/2020)  2. Patient to improve score on FOTO to 10/100 or less to achieve goal of returning to functional and recreational activities. Not MET (9/4/2020)  3. Patient to have decreased pain to 1/10 at worst in order to improve QOL.  Not MET (9/4/2020)  4. Patient to improve B UE strength to goals stated above to improve use of B UE for functional activities.   Progressing not MET (9/4/2020)  5. Patient to demo increase L shoulder AROM to goals stated above to improve ability to use L UE for ADLs.  Not Tested (9/4/2020)  6. Patient to perform work and recreational activities including tumbling, stunting, and front and lateral weighted raises without increased symptoms. Not MET (9/4/2020)         Plan     Plan of care Certification: 7/28/2020 to 10/26/2020.     Outpatient Physical Therapy 2 times weekly for 8 weeks to include any combination of the following interventions: virtual visits, dry needling, modalities, electrical stimulation (IFC, Pre-Mod, Attended with Functional Dry Needling), Cervical/Lumbar Traction, Gait Training, Manual Therapy, Moist Heat/ Ice,  Neuromuscular Re-ed, Patient Education, Self Care, Therapeutic Activites, Therapeutic Exercise and Ultrasound     Monitor response to today's treatment and progress with PT POC as indicated and tolerated.    Ricarda Quintero, PT

## 2020-09-10 ENCOUNTER — CLINICAL SUPPORT (OUTPATIENT)
Dept: REHABILITATION | Facility: HOSPITAL | Age: 25
End: 2020-09-10
Payer: COMMERCIAL

## 2020-09-10 DIAGNOSIS — G89.29 CHRONIC LEFT SHOULDER PAIN: ICD-10-CM

## 2020-09-10 DIAGNOSIS — M25.512 CHRONIC LEFT SHOULDER PAIN: ICD-10-CM

## 2020-09-10 DIAGNOSIS — R53.1 DECREASED STRENGTH: ICD-10-CM

## 2020-09-10 PROCEDURE — 97140 MANUAL THERAPY 1/> REGIONS: CPT

## 2020-09-10 PROCEDURE — 97110 THERAPEUTIC EXERCISES: CPT

## 2020-09-10 NOTE — PROGRESS NOTES
Physical Therapy Treatment Note     Name: Swati Durán  Clinic Number: [id number]    Therapy Diagnosis:   Encounter Diagnoses   Name Primary?    Decreased strength     Chronic left shoulder pain        Physician: Vianey Zamora MD    Visit Date: 9/10/2020    Physician Orders: PT Eval and Treat  Medical Diagnosis from Referral: chronic left shoulder pain  Evaluation Date: 7/28/2020  Authorization Period Expiration: 07/19/2021  Plan of Care Expiration: 10/26/2020  Visit # / Visits authorized: 6/20 (total visits 7)     PROGRESS NOTE: 10/4/2020    Time In: 11:00 am  Time Out: 11:50 am  Total Billable Time: 46 minutes    Precautions: Standard and hypermobility    Subjective     Pt reports: Did well after last treatment she is not sore today.      She was compliant with home exercise program.  Response to previous treatment: None  Functional change:     Pain: 0/10, 4-5/10 at worst last night after vaccumming  Location: right shoulder, anterior and posterior aspects and within the joint    Objective       Swati received therapeutic exercises to develop strength, endurance and core stabilization for 36 minutes including:    ABC's 1x/ea 5# KB bottoms up, bilateral  Serratus punch, 8x 5# KB bottoms up, bilateral  Subscapularis isolation isometric 2min/ea  Prone LT isometrics 2' 10s  Prone scap retraction 2' 10s  spidermans small circles CCW/CW red theraband 10x/ea B  ina V B, no abd,  10x2 B red theraband  Quadruped with core activation, 5s hold 2 reps of hold then rest, 4 sets   Wall plank on elbows 5s x5  Quadruped with leg slide and lower trap activation 3x/ea  Quadruped ball push with UE reach 3x/each arm (unweighting only)    Defer:  Plank on elbows/knees 5s x3  Extended arms plank  1x 5s    Swati received the following manual therapy techniques: Myofacial release, Soft tissue Mobilization and manual releases were applied to the: lateral/medial scapular musculature and lats/infraspinatus, pectoral  minor, upper trapezial/levator scapular musculature for 10 minutes pre-treatment.    Home Exercises Provided and Patient Education Provided     Education provided:   - HEP, scapular stability    Written Home Exercises Provided: Patient instructed to cont prior HEP.  Exercises were reviewed and Swati was able to demonstrate them prior to the end of the session.  Swati demonstrated good  understanding of the education provided.     See EMR under Patient Instructions for exercises provided prior visit.    Assessment     Good tolerance to all treatment, she is progressing well with all therex.  She does require cues to maintain scapular position and avoid protracted position with therex.      Swati is progressing well towards her goals.   Pt prognosis is Good.     Pt will continue to benefit from skilled outpatient physical therapy to address the deficits listed in the problem list box on initial evaluation, provide pt/family education and to maximize pt's level of independence in the home and community environment.     Pt's spiritual, cultural and educational needs considered and pt agreeable to plan of care and goals.     Anticipated barriers to physical therapy: history of repetitive dislocations    Goals:  Short Term Goals: In 4 weeks   1. Patient to be educated on HEP. MET (9/4/2020)  2. Patient to improve score on the FOTO to progress toward goal of returning to functional and recreational activities.  Not MET (9/4/2020)  3. Patient to have pain less than 4/10 at worst in order to improve QOL.  MET (9/4/2020)  4. Patient to improve strength by 1/2 MMT grade in L UE without pain to improve use of L UE for functional activities.  MET (9/4/2020)  5. Patient will demonstrate improved scapular mobility to improve shoulder mechanics and decrease pain during ADLs. Progressing not MET (9/4/2020)  6. Patient to be educated on postural and body mechanics awareness.  MET (9/4/2020)     Long Term Goals: In 8 weeks  1.  Patient will demonstrate independence in HEP with minimal cueing in order to ensure maintenance of gains.  MET (9/4/2020)  2. Patient to improve score on FOTO to 10/100 or less to achieve goal of returning to functional and recreational activities. Not MET (9/4/2020)  3. Patient to have decreased pain to 1/10 at worst in order to improve QOL.  Progressing not Met (9/4/2020)  4. Patient to improve B UE strength to goals stated above to improve use of B UE for functional activities.   Progressing not MET (9/4/2020)  5. Patient to demo increase L shoulder AROM to goals stated above to improve ability to use L UE for ADLs.  Not Tested (9/4/2020)  6. Patient to perform work and recreational activities including tumbling, stunting, and front and lateral weighted raises without increased symptoms. Not MET (9/4/2020)         Plan     Plan of care Certification: 7/28/2020 to 10/26/2020.     Outpatient Physical Therapy 2 times weekly for 8 weeks to include any combination of the following interventions: virtual visits, dry needling, modalities, electrical stimulation (IFC, Pre-Mod, Attended with Functional Dry Needling), Cervical/Lumbar Traction, Gait Training, Manual Therapy, Moist Heat/ Ice, Neuromuscular Re-ed, Patient Education, Self Care, Therapeutic Activites, Therapeutic Exercise and Ultrasound     Monitor response to today's treatment and progress with PT POC as indicated and tolerated.    Ricarda Quintero, PT

## 2020-09-11 ENCOUNTER — CLINICAL SUPPORT (OUTPATIENT)
Dept: REHABILITATION | Facility: HOSPITAL | Age: 25
End: 2020-09-11
Payer: COMMERCIAL

## 2020-09-11 DIAGNOSIS — M25.512 CHRONIC LEFT SHOULDER PAIN: ICD-10-CM

## 2020-09-11 DIAGNOSIS — R53.1 DECREASED STRENGTH: ICD-10-CM

## 2020-09-11 DIAGNOSIS — G89.29 CHRONIC LEFT SHOULDER PAIN: ICD-10-CM

## 2020-09-11 PROCEDURE — 97110 THERAPEUTIC EXERCISES: CPT

## 2020-09-11 PROCEDURE — 97140 MANUAL THERAPY 1/> REGIONS: CPT

## 2020-09-11 NOTE — PROGRESS NOTES
Physical Therapy Treatment Note     Name: Swati Durán  Clinic Number: [id number]    Therapy Diagnosis:   Encounter Diagnoses   Name Primary?    Decreased strength     Chronic left shoulder pain        Physician: Vianey Zamora MD    Visit Date: 9/11/2020    Physician Orders: PT Eval and Treat  Medical Diagnosis from Referral: chronic left shoulder pain  Evaluation Date: 7/28/2020  Authorization Period Expiration: 07/19/2021  Plan of Care Expiration: 10/26/2020  Visit # / Visits authorized: 7/20 (total visits 8) (Insurance 50 then authorization)     PROGRESS NOTE: 10/4/2020    Time In: 12:40 am  Time Out: 1:15 pm  Total Billable Time: 38 minutes    Precautions: Standard and hypermobility    Subjective     Pt reports: Did well after last treatment she was very tired and sore after treatment, sore last night.  Difficult to control her shoulders today.    She was not compliant with home exercise program, due to therapy yesterday and still very fatigued.  Response to previous treatment: None  Functional change: None    Pain: NT/10  Location: right shoulder, anterior and posterior aspects and within the joint    Objective       Swati received therapeutic exercises to develop strength, endurance and core stabilization for 23 minutes including:    ABC's (to M) 1x/ea 5# KB bottoms up, bilateral ( to V on L side)  Serratus punch, 4x 5# KB bottoms up, bilateral  Subscapularis isolation isometric 10x/ea 5s  Prone LT isometrics with lats 3/5x  Prone scap retraction 2' 10s  spidermans small circles CCW/CW yellow theraband 10x/ea B  ina V B, no abd,  10x2 B yellow theraband      Defer:  Quadruped with leg slide and lower trap activation 3x/ea  Plank on wall - elbows 15s x3  Plank on elbows/knees 5s x3  Quadruped ball push with UE reach 5x/each arm (unweighting only)  Extended arms plank  1x 5s  Quadruped with core activation, 5s hold 2 reps of hold then rest, 4 sets   Wall plank on elbows 5s x5    Swati  received the following manual therapy techniques: Myofacial release, Soft tissue Mobilization and manual releases were applied to the: lateral/medial scapular musculature and lats/infraspinatus, pectoral minor, upper trapezial/levator scapular musculature for 15 minutes pre-treatment/post treatment.    Home Exercises Provided and Patient Education Provided     Education provided:   - HEP, scapular stability    Written Home Exercises Provided: Patient instructed to cont prior HEP.  Exercises were reviewed and Swati was able to demonstrate them prior to the end of the session.  Swati demonstrated good  understanding of the education provided.     See EMR under Patient Instructions for exercises provided prior visit.    Assessment     Pt with decreased tolerance to all therex today due to fatigue and soreness from yesterday's therapy clinic.  She was visibly fatigued and reports increased soreness during therex today, due to this treatment modified today.    Swati is progressing well towards her goals.   Pt prognosis is Good.     Pt will continue to benefit from skilled outpatient physical therapy to address the deficits listed in the problem list box on initial evaluation, provide pt/family education and to maximize pt's level of independence in the home and community environment.     Pt's spiritual, cultural and educational needs considered and pt agreeable to plan of care and goals.     Anticipated barriers to physical therapy: history of repetitive dislocations    Goals:  Short Term Goals: In 4 weeks   1. Patient to be educated on HEP. MET (9/4/2020)  2. Patient to improve score on the FOTO to progress toward goal of returning to functional and recreational activities.  Not MET (9/4/2020)  3. Patient to have pain less than 4/10 at worst in order to improve QOL.  MET (9/4/2020)  4. Patient to improve strength by 1/2 MMT grade in L UE without pain to improve use of L UE for functional activities.  MET  (9/4/2020)  5. Patient will demonstrate improved scapular mobility to improve shoulder mechanics and decrease pain during ADLs. Progressing not MET (9/4/2020)  6. Patient to be educated on postural and body mechanics awareness.  MET (9/4/2020)     Long Term Goals: In 8 weeks  1. Patient will demonstrate independence in HEP with minimal cueing in order to ensure maintenance of gains.  MET (9/4/2020)  2. Patient to improve score on FOTO to 10/100 or less to achieve goal of returning to functional and recreational activities. Not MET (9/4/2020)  3. Patient to have decreased pain to 1/10 at worst in order to improve QOL.  Not MET (9/4/2020)  4. Patient to improve B UE strength to goals stated above to improve use of B UE for functional activities.   Progressing not MET (9/4/2020)  5. Patient to demo increase L shoulder AROM to goals stated above to improve ability to use L UE for ADLs.  Not Tested (9/4/2020)  6. Patient to perform work and recreational activities including tumbling, stunting, and front and lateral weighted raises without increased symptoms. Not MET (9/4/2020)         Plan     Plan of care Certification: 7/28/2020 to 10/26/2020.     Outpatient Physical Therapy 2 times weekly for 8 weeks to include any combination of the following interventions: virtual visits, dry needling, modalities, electrical stimulation (IFC, Pre-Mod, Attended with Functional Dry Needling), Cervical/Lumbar Traction, Gait Training, Manual Therapy, Moist Heat/ Ice, Neuromuscular Re-ed, Patient Education, Self Care, Therapeutic Activites, Therapeutic Exercise and Ultrasound     Monitor response to today's treatment and progress with PT POC as indicated and tolerated.    Ricarda Quintero, PT

## 2020-09-14 ENCOUNTER — CLINICAL SUPPORT (OUTPATIENT)
Dept: REHABILITATION | Facility: HOSPITAL | Age: 25
End: 2020-09-14
Payer: COMMERCIAL

## 2020-09-14 DIAGNOSIS — G89.29 CHRONIC LEFT SHOULDER PAIN: ICD-10-CM

## 2020-09-14 DIAGNOSIS — R53.1 DECREASED STRENGTH: ICD-10-CM

## 2020-09-14 DIAGNOSIS — M25.512 CHRONIC LEFT SHOULDER PAIN: ICD-10-CM

## 2020-09-14 PROCEDURE — 97140 MANUAL THERAPY 1/> REGIONS: CPT

## 2020-09-14 PROCEDURE — 97110 THERAPEUTIC EXERCISES: CPT

## 2020-09-14 NOTE — PROGRESS NOTES
Physical Therapy Treatment Note     Name: Swati Durán  Clinic Number: [id number]    Therapy Diagnosis:   Encounter Diagnoses   Name Primary?    Decreased strength     Chronic left shoulder pain        Physician: Vianey Zamora MD    Visit Date: 9/14/2020    Physician Orders: PT Eval and Treat  Medical Diagnosis from Referral: chronic left shoulder pain  Evaluation Date: 7/28/2020  Authorization Period Expiration: 07/19/2021  Plan of Care Expiration: 10/26/2020  Visit # / Visits authorized: 8/20 (total visits 9) (Insurance 50 then authorization)     PROGRESS NOTE: 10/4/2020    Time In: 11:50 am  Time Out: 12:32 pm  Total Billable Time: 40 minutes    Precautions: Standard and hypermobility    Subjective     Pt reports: Did well after last treatment she had an allergic reaction over the weekend - she thinks from almond milk.  She reports no increased pain or issues with shoulder.    She was compliant with home exercise program.  Response to previous treatment: None  Functional change: None    Pain: 0/10  Location: right shoulder, anterior and posterior aspects and within the joint    Objective       Swati received therapeutic exercises to develop strength, endurance and core stabilization for 30 minutes including:    ABC's  1x/ea 5# KB bottoms up, bilateral  Serratus punch, 8x 5# KB bottoms up, bilateral  Subscapularis isolation theraband yellow 15x/ea   Prone LT isometrics with lats 2/10x  Prone scap rows 2/10   spidermans small circles CCW/CW green theraband 10x/ea B, up and down as well  ina V B, no abd,  15x2 B red theraband  Quadruped with leg slide and lower trap activation 5x2/ea  Wall plank on elbows 10s x3    Defer:   Plank on elbows/knees 5s x3  Quadruped ball push with UE reach 5x/each arm (unweighting only)  Extended arms plank  1x 5s  Quadruped with core activation, 5s hold 2 reps of hold then rest, 4 sets       Swati received the following manual therapy techniques: Myofacial  release, Soft tissue Mobilization and manual releases were applied to the: lateral/medial scapular musculature and lats/infraspinatus, pectoral minor, upper trapezial/levator scapular musculature for 10 minutes pre-treatment/post treatment.    Home Exercises Provided and Patient Education Provided     Education provided:   - HEP, scapular stability    Written Home Exercises Provided: Patient instructed to cont prior HEP.  Exercises were reviewed and Swati was able to demonstrate them prior to the end of the session.  Swati demonstrated good  understanding of the education provided.     See EMR under Patient Instructions for exercises provided prior visit.    Assessment     Good tolerance to all treatment she was able to progress with all therex without any c/o.  Good stability of scapula with all therex and patient without c/o pain.  She does require cues to complete all therex correctly.    Swati is progressing well towards her goals.   Pt prognosis is Good.     Pt will continue to benefit from skilled outpatient physical therapy to address the deficits listed in the problem list box on initial evaluation, provide pt/family education and to maximize pt's level of independence in the home and community environment.     Pt's spiritual, cultural and educational needs considered and pt agreeable to plan of care and goals.     Anticipated barriers to physical therapy: history of repetitive dislocations    Goals:  Short Term Goals: In 4 weeks   1. Patient to be educated on HEP. MET (9/4/2020)  2. Patient to improve score on the FOTO to progress toward goal of returning to functional and recreational activities.  Not MET (9/4/2020)  3. Patient to have pain less than 4/10 at worst in order to improve QOL.  MET (9/4/2020)  4. Patient to improve strength by 1/2 MMT grade in L UE without pain to improve use of L UE for functional activities.  MET (9/4/2020)  5. Patient will demonstrate improved scapular mobility to  improve shoulder mechanics and decrease pain during ADLs. Progressing not MET (9/4/2020)  6. Patient to be educated on postural and body mechanics awareness.  MET (9/4/2020)     Long Term Goals: In 8 weeks  1. Patient will demonstrate independence in HEP with minimal cueing in order to ensure maintenance of gains.  MET (9/4/2020)  2. Patient to improve score on FOTO to 10/100 or less to achieve goal of returning to functional and recreational activities. Not MET (9/4/2020)  3. Patient to have decreased pain to 1/10 at worst in order to improve QOL.  Not MET (9/4/2020)  4. Patient to improve B UE strength to goals stated above to improve use of B UE for functional activities.   Progressing not MET (9/4/2020)  5. Patient to demo increase L shoulder AROM to goals stated above to improve ability to use L UE for ADLs.  Not Tested (9/4/2020)  6. Patient to perform work and recreational activities including tumbling, stunting, and front and lateral weighted raises without increased symptoms. Not MET (9/4/2020)         Plan     Plan of care Certification: 7/28/2020 to 10/26/2020.     Outpatient Physical Therapy 2 times weekly for 8 weeks to include any combination of the following interventions: virtual visits, dry needling, modalities, electrical stimulation (IFC, Pre-Mod, Attended with Functional Dry Needling), Cervical/Lumbar Traction, Gait Training, Manual Therapy, Moist Heat/ Ice, Neuromuscular Re-ed, Patient Education, Self Care, Therapeutic Activites, Therapeutic Exercise and Ultrasound     Monitor response to today's treatment and progress with PT POC as indicated and tolerated.    Ricarda Quintero, PT

## 2020-09-21 ENCOUNTER — CLINICAL SUPPORT (OUTPATIENT)
Dept: REHABILITATION | Facility: HOSPITAL | Age: 25
End: 2020-09-21
Payer: COMMERCIAL

## 2020-09-21 DIAGNOSIS — G89.29 CHRONIC LEFT SHOULDER PAIN: ICD-10-CM

## 2020-09-21 DIAGNOSIS — M25.512 CHRONIC LEFT SHOULDER PAIN: ICD-10-CM

## 2020-09-21 DIAGNOSIS — R53.1 DECREASED STRENGTH: ICD-10-CM

## 2020-09-21 PROCEDURE — 97140 MANUAL THERAPY 1/> REGIONS: CPT

## 2020-09-21 PROCEDURE — 97110 THERAPEUTIC EXERCISES: CPT

## 2020-09-21 NOTE — PROGRESS NOTES
Physical Therapy Treatment Note     Name: Swati Durán  Clinic Number: [id number]    Therapy Diagnosis:   Encounter Diagnoses   Name Primary?    Decreased strength     Chronic left shoulder pain        Physician: Vianey Zamora MD    Visit Date: 9/21/2020    Physician Orders: PT Eval and Treat  Medical Diagnosis from Referral: chronic left shoulder pain  Evaluation Date: 7/28/2020  Authorization Period Expiration: 07/19/2021  Plan of Care Expiration: 10/26/2020  Visit # / Visits authorized: 9/20 (total visits 10) (Insurance 50 then authorization)     PROGRESS NOTE: 10/4/2020    Time In: 11:50 am  Time Out: 12:35 pm  Total Billable Time: 40 minutes    Precautions: Standard and hypermobility    Subjective     Pt reports: overdid her exercises over the weekend, having a lot of pain and soreness in her L shoulder.  Better after manual and tape makes it feel like she is using her muscles differently.    She was compliant with home exercise program.  Response to previous treatment: None  Functional change: None    Pain: 3/10 today, more soreness then pain  Location: right shoulder, anterior and posterior aspects and within the joint    Objective       Swati received therapeutic exercises to develop strength, endurance and core stabilization for 25 minutes including:    ABC's  1x/ea 5# KB bottoms up, bilateral  Serratus punch, 10x 5# KB bottoms up, bilateral  Prone LT isometrics with lats 2/10x, on swiss ball  Prone scap rows 2/10x, on swiss ball  spidermans small circles CCW/CW green theraband, middle circles 5x/ea B,   ina V B, no abd,  15x B red theraband    Defer:   Subscapularis isolation theraband yellow 15x/ea   Quadruped with leg slide and lower trap activation 5x2/ea  Wall plank on elbows 10s x3  Plank on elbows/knees 5s x3  Quadruped ball push with UE reach 5x/each arm (unweighting only)  Extended arms plank  1x 5s  Quadruped with core activation, 5s hold 2 reps of hold then rest, 4 sets        Swati received the following manual therapy techniques: Myofacial release, Soft tissue Mobilization and manual releases were applied to the: lateral/medial scapular musculature and lats/infraspinatus, pectoral minor, upper trapezial/levator scapular musculature for 15 minutes pre-treatment/post treatment.  Along with tape applied pre therex to increase scapular/lower trapezial activation and improve postural awareness.    Home Exercises Provided and Patient Education Provided     Education provided:   - HEP, scapular stability    Written Home Exercises Provided: Patient instructed to cont prior HEP.  Exercises were reviewed and Swati was able to demonstrate them prior to the end of the session.  Swati demonstrated good  understanding of the education provided.     See EMR under Patient Instructions for exercises provided prior visit.    Assessment     Can really feel a difference with all the exercises today, with tape on.  Cues for scapular stabilization and ROM, she remains limited in her infraspinatus and teres minor musculature but improved post treatment and after manual therapy.    Swati is progressing well towards her goals.   Pt prognosis is Good.     Pt will continue to benefit from skilled outpatient physical therapy to address the deficits listed in the problem list box on initial evaluation, provide pt/family education and to maximize pt's level of independence in the home and community environment.     Pt's spiritual, cultural and educational needs considered and pt agreeable to plan of care and goals.     Anticipated barriers to physical therapy: history of repetitive dislocations    Goals:  Short Term Goals: In 4 weeks   1. Patient to be educated on HEP. MET (9/4/2020)  2. Patient to improve score on the FOTO to progress toward goal of returning to functional and recreational activities.  Not MET (9/4/2020)  3. Patient to have pain less than 4/10 at worst in order to improve QOL.  MET  (9/4/2020)  4. Patient to improve strength by 1/2 MMT grade in L UE without pain to improve use of L UE for functional activities.  MET (9/4/2020)  5. Patient will demonstrate improved scapular mobility to improve shoulder mechanics and decrease pain during ADLs. Progressing not MET (9/4/2020)  6. Patient to be educated on postural and body mechanics awareness.  MET (9/4/2020)     Long Term Goals: In 8 weeks  1. Patient will demonstrate independence in HEP with minimal cueing in order to ensure maintenance of gains.  MET (9/4/2020)  2. Patient to improve score on FOTO to 10/100 or less to achieve goal of returning to functional and recreational activities. Not MET (9/4/2020)  3. Patient to have decreased pain to 1/10 at worst in order to improve QOL.  Not MET (9/4/2020)  4. Patient to improve B UE strength to goals stated above to improve use of B UE for functional activities.   Progressing not MET (9/4/2020)  5. Patient to demo increase L shoulder AROM to goals stated above to improve ability to use L UE for ADLs.  Not Tested (9/4/2020)  6. Patient to perform work and recreational activities including tumbling, stunting, and front and lateral weighted raises without increased symptoms. Not MET (9/4/2020)         Plan     Plan of care Certification: 7/28/2020 to 10/26/2020.     Outpatient Physical Therapy 2 times weekly for 8 weeks to include any combination of the following interventions: virtual visits, dry needling, modalities, electrical stimulation (IFC, Pre-Mod, Attended with Functional Dry Needling), Cervical/Lumbar Traction, Gait Training, Manual Therapy, Moist Heat/ Ice, Neuromuscular Re-ed, Patient Education, Self Care, Therapeutic Activites, Therapeutic Exercise and Ultrasound     Monitor response to today's treatment and progress with PT POC as indicated and tolerated.    Ricarda Quintero, PT

## 2020-09-30 ENCOUNTER — CLINICAL SUPPORT (OUTPATIENT)
Dept: REHABILITATION | Facility: HOSPITAL | Age: 25
End: 2020-09-30
Payer: COMMERCIAL

## 2020-09-30 DIAGNOSIS — M25.512 CHRONIC LEFT SHOULDER PAIN: ICD-10-CM

## 2020-09-30 DIAGNOSIS — G89.29 CHRONIC LEFT SHOULDER PAIN: ICD-10-CM

## 2020-09-30 DIAGNOSIS — R53.1 DECREASED STRENGTH: ICD-10-CM

## 2020-09-30 PROCEDURE — 97110 THERAPEUTIC EXERCISES: CPT

## 2020-09-30 PROCEDURE — 97140 MANUAL THERAPY 1/> REGIONS: CPT

## 2020-09-30 NOTE — PROGRESS NOTES
Physical Therapy Treatment Note     Name: Swati Durán  Clinic Number: [id number]    Therapy Diagnosis:   Encounter Diagnoses   Name Primary?    Decreased strength     Chronic left shoulder pain        Physician: Vianey Zamora MD    Visit Date: 9/30/2020    Physician Orders: PT Eval and Treat  Medical Diagnosis from Referral: chronic left shoulder pain  Evaluation Date: 7/28/2020  Authorization Period Expiration: 07/19/2021  Plan of Care Expiration: 10/26/2020  Visit # / Visits authorized: 10/20 (total visits 11) (Insurance 50 then authorization)     PROGRESS NOTE: 10/4/2020    Time In: 11:50 am  Time Out: 12:35 pm  Total Billable Time: 38 minutes    Precautions: Standard and hypermobility    Subjective     Pt reports: She had 2 shots last week and wasn't able to come to therapy.  She reports that she did feel better with her shoulders taped and felt like her muscles were working better between her shoulder blades.    She was compliant with home exercise program.  Response to previous treatment: None  Functional change: None    Pain: 3/10 today, more soreness then pain  Location: right shoulder, anterior and posterior aspects and within the joint    Objective       Swati received therapeutic exercises to develop strength, endurance and core stabilization for 28 minutes including:    ABC's  1x/ea 5# KB bottoms up, bilateral  Serratus punch, 10x 5# KB bottoms up, bilateral  Prone LT isometrics with lats 2/10x, on swiss ball  Prone scap rows 2/10x, on swiss ball  Prone horizontal ABD  2/10x  spidermans small circles CCW/CW green theraband, middle circles 5x/ea B,   ina V B, no abd,  15x B red theraband  Quadruped with leg slide and lower trap activation 5x2/ea  Quadruped ball push with UE reach 5x/each arm (unweighting only)  Wall plank on elbows 10s x3  Plank on elbows/knees 5s x3  Dead bugs on wall 10x2, 4# MB      Defer:   Subscapularis isolation theraband yellow 15x/ea   Extended arms plank  1x  5s  Quadruped with core activation, 5s hold 2 reps of hold then rest, 4 sets       Swati received the following manual therapy techniques: Myofacial release, Soft tissue Mobilization and manual releases were applied to the: lateral/medial scapular musculature and lats/infraspinatus, pectoral minor, upper trapezial/levator scapular musculature for 10 minutes pre-treatment/post treatment.  Along with tape applied pre therex to increase scapular/lower trapezial activation and improve postural awareness.    Home Exercises Provided and Patient Education Provided     Education provided:   - HEP, scapular stability    Written Home Exercises Provided: Patient instructed to cont prior HEP.  Exercises were reviewed and Swati was able to demonstrate them prior to the end of the session.  Swati demonstrated good  understanding of the education provided.     See EMR under Patient Instructions for exercises provided prior visit.    Assessment     Can really feel a difference with the tape on, feels like the dead bugs on wall are really good too.  She continues to require cues to stabilize at scapular mm with all closed chain exercises.  Difficulty isolating shoulder flexion without low back extension.       Swati is progressing well towards her goals.   Pt prognosis is Good.     Pt will continue to benefit from skilled outpatient physical therapy to address the deficits listed in the problem list box on initial evaluation, provide pt/family education and to maximize pt's level of independence in the home and community environment.     Pt's spiritual, cultural and educational needs considered and pt agreeable to plan of care and goals.     Anticipated barriers to physical therapy: history of repetitive dislocations    Goals:  Short Term Goals: In 4 weeks   1. Patient to be educated on HEP. MET (9/4/2020)  2. Patient to improve score on the FOTO to progress toward goal of returning to functional and recreational activities.   Not MET (9/4/2020)  3. Patient to have pain less than 4/10 at worst in order to improve QOL.  MET (9/4/2020)  4. Patient to improve strength by 1/2 MMT grade in L UE without pain to improve use of L UE for functional activities.  MET (9/4/2020)  5. Patient will demonstrate improved scapular mobility to improve shoulder mechanics and decrease pain during ADLs. Progressing not MET (9/4/2020)  6. Patient to be educated on postural and body mechanics awareness.  MET (9/4/2020)     Long Term Goals: In 8 weeks  1. Patient will demonstrate independence in HEP with minimal cueing in order to ensure maintenance of gains.  MET (9/4/2020)  2. Patient to improve score on FOTO to 10/100 or less to achieve goal of returning to functional and recreational activities. Not MET (9/4/2020)  3. Patient to have decreased pain to 1/10 at worst in order to improve QOL.  Not MET (9/4/2020)  4. Patient to improve B UE strength to goals stated above to improve use of B UE for functional activities.   Progressing not MET (9/4/2020)  5. Patient to demo increase L shoulder AROM to goals stated above to improve ability to use L UE for ADLs.  Not Tested (9/4/2020)  6. Patient to perform work and recreational activities including tumbling, stunting, and front and lateral weighted raises without increased symptoms. Not MET (9/4/2020)         Plan     Plan of care Certification: 7/28/2020 to 10/26/2020.     Outpatient Physical Therapy 2 times weekly for 8 weeks to include any combination of the following interventions: virtual visits, dry needling, modalities, electrical stimulation (IFC, Pre-Mod, Attended with Functional Dry Needling), Cervical/Lumbar Traction, Gait Training, Manual Therapy, Moist Heat/ Ice, Neuromuscular Re-ed, Patient Education, Self Care, Therapeutic Activites, Therapeutic Exercise and Ultrasound     Monitor response to today's treatment and progress with PT POC as indicated and tolerated.    Ricarda Quintero, PT

## 2020-10-02 ENCOUNTER — CLINICAL SUPPORT (OUTPATIENT)
Dept: REHABILITATION | Facility: HOSPITAL | Age: 25
End: 2020-10-02
Payer: COMMERCIAL

## 2020-10-02 DIAGNOSIS — G89.29 CHRONIC LEFT SHOULDER PAIN: ICD-10-CM

## 2020-10-02 DIAGNOSIS — M25.512 CHRONIC LEFT SHOULDER PAIN: ICD-10-CM

## 2020-10-02 DIAGNOSIS — R53.1 DECREASED STRENGTH: ICD-10-CM

## 2020-10-02 PROCEDURE — 97140 MANUAL THERAPY 1/> REGIONS: CPT

## 2020-10-02 PROCEDURE — 97110 THERAPEUTIC EXERCISES: CPT

## 2020-10-02 NOTE — PROGRESS NOTES
Physical Therapy Treatment Note     Name: Swati Durán  [facility] Number: [id number]    Therapy Diagnosis:   Encounter Diagnoses   Name Primary?    Decreased strength     Chronic left shoulder pain        Physician: Vianey Zamora MD    Visit Date: 10/2/2020    Physician Orders: PT Eval and Treat  Medical Diagnosis from Referral: chronic left shoulder pain  Evaluation Date: 7/28/2020  Authorization Period Expiration: 07/19/2021  Plan of Care Expiration: 10/26/2020  Visit # / Visits authorized: 11/20 (total visits 12) (Insurance 50 then authorization)     PROGRESS NOTE: 11/4/2020    Time In: 11:50 am  Time Out: 12:35 pm  Total Billable Time: 30 minutes    Precautions: Standard and hypermobility    Subjective     Pt reports: Burning pain with exercises, no pain on presentation.  She has been doing her exercises, she does feel stronger.  She did have an episode where her shoulder felt a little funny but no reports of subluxation or dislocation.  She reports that some of the progressions today feel difficult to do but are not painful.    She was compliant with home exercise program.  Response to previous treatment: None  Functional change: None    Pain: 0/10 today, more soreness then pain  Location: right shoulder, anterior and posterior aspects and within the joint    Objective       Measurements:  Shoulder AROM Right  7/28/2020 Left  7/28/2020 Right  10/2/2020 Left  10/2/2020   Shoulder Flexion (180) Full 170 160 170   Shoulder Extension (60) 50 55 60 60   Shoulder Abduction (180) 162 110 p! 165 170   Shoulder ER (90) R = L, WFL -- T3 T3   Shoulder IR (70) R = L, WFL -- Mid thoracic spine Mid thoracic spine        Shoulder AROM Goal   Shoulder Flexion (180) 180   Shoulder Extension (60) 60   Shoulder Abduction (180) 165 B   Shoulder ER (90) N/A   Shoulder IR (70) N/A      STRENGTH:     U/E MMT Right  7/28/2020 Left  7/28/2020 Right   10/2/2020 Left  10/2/2020   Cervical SB 4-/5 4-/5 4/5 4/5   Shoulder  Elevation 4+/5 4+/5 NT NT   Shoulder Flexion 4/5 4/5 4+/5 (arm at side) 4+/5 (arm at side)   Shoulder Extension 4+/5 4/5 4+/5 (arm at side) 4+/5 (arm at side)   Shoulder Abduction 4+/5 4/5 4+/5 4+/5   Shoulder IR 4/5 4-/5 4+/5 (arm at side) 4+/5 (arm at side)   Shoulder ER 4/5 4-/5 4+/5 (arm at side) 4+/5 (arm at side)   Elbow Flexion  4+/5 4/5 4+/5  4+/5   Elbow Extension 4+/5 4/5 NT NT   serrtaus anterior NT NT 4/5 4/5   supraspinatus NT NT 4/5 4/5       FOTO:      Swati received therapeutic exercises to develop strength, endurance and core stabilization for 22 minutes including:    Prone LT isometrics with lats 2/10x, on swiss ball  Prone scap rows 2/10x, on swiss ball 2#  Prone horizontal ABD (on mat)  2/10x  spidermans small circles CCW/CW green theraband, middle circles 5x/ea B,   ina V B, no abd,  15x B red theraband; with 5# KB hooked on band for 10 reps ER/IR  Quadruped with leg slide and lower trap activation 5x2/ea  Quadruped ball push with UE reach 5x/each arm (unweighting only)  Dead bugs on wall with OH reach 10x2, 4# MB    Defer:  Wall plank on elbows 10s x3  ABC's  1x/ea 5# KB bottoms up, bilateral  Serratus punch, 10x 5# KB bottoms up, bilateral  Plank on elbows/knees 5s x3   Subscapularis isolation theraband yellow 15x/ea   Extended arms plank  1x 5s  Quadruped with core activation, 5s hold 2 reps of hold then rest, 4 sets     Swati received the following manual therapy techniques: Myofacial release, Soft tissue Mobilization and manual releases were applied to the: lateral/medial scapular musculature and lats/infraspinatus, upper trapezial/levator scapular musculature for 8 minutes pre-treatment/post treatment.      Home Exercises Provided and Patient Education Provided     Education provided:   - HEP, scapular stability    Written Home Exercises Provided: Patient instructed to cont prior HEP.  Exercises were reviewed and Swati was able to demonstrate them prior to the end of the  session.  Swati demonstrated good  understanding of the education provided.     See EMR under Patient Instructions for exercises provided prior visit.    Assessment     Overall patient has continued to rate herself as functionally improved since her visit 7/28/2020 - most likely she overestimated her ability in her initial survey.  She has also improved with her strength and AROM, along with her tolerance to all therex and progressions.  She continues to fatigue quickly but is able to correct herself with verbal and tactile cues, and recognizes when she cannot hold a position any longer.    Swati is progressing well towards her goals.   Pt prognosis is Good.     Pt will continue to benefit from skilled outpatient physical therapy to address the deficits listed in the problem list box on initial evaluation, provide pt/family education and to maximize pt's level of independence in the home and community environment.     Pt's spiritual, cultural and educational needs considered and pt agreeable to plan of care and goals.     Anticipated barriers to physical therapy: history of repetitive dislocations    Goals:  Short Term Goals: In 4 weeks   1. Patient to be educated on HEP. MET (9/4/2020)  2. Patient to improve score on the FOTO to progress toward goal of returning to functional and recreational activities.  Not MET (9/4/2020)  3. Patient to have pain less than 4/10 at worst in order to improve QOL.  MET (9/4/2020)  4. Patient to improve strength by 1/2 MMT grade in L UE without pain to improve use of L UE for functional activities.  MET (9/4/2020)  5. Patient will demonstrate improved scapular mobility to improve shoulder mechanics and decrease pain during ADLs. Progressing not MET (9/4/2020)  6. Patient to be educated on postural and body mechanics awareness.  MET (9/4/2020)     Long Term Goals: In 8 weeks  1. Patient will demonstrate independence in HEP with minimal cueing in order to ensure maintenance of  gains.  MET (9/4/2020)  2. Patient to improve score on FOTO to 10/100 or less to achieve goal of returning to functional and recreational activities. Not MET (9/4/2020)  3. Patient to have decreased pain to 1/10 at worst in order to improve QOL.  Not MET (10/2/2020)  4. Patient to improve B UE strength to goals stated above to improve use of B UE for functional activities.   Progressing not MET (10/2/2020)  5. Patient to demo increase L shoulder AROM to goals stated above to improve ability to use L UE for ADLs.  Not MET (10/2/2020)  6. Patient to perform work and recreational activities including tumbling, stunting, and front and lateral weighted raises without increased symptoms. Not MET (10/2/2020)         Plan     Plan of care Certification: 7/28/2020 to 10/26/2020.     Outpatient Physical Therapy 2 times weekly for 8 weeks to include any combination of the following interventions: virtual visits, dry needling, modalities, electrical stimulation (IFC, Pre-Mod, Attended with Functional Dry Needling), Cervical/Lumbar Traction, Gait Training, Manual Therapy, Moist Heat/ Ice, Neuromuscular Re-ed, Patient Education, Self Care, Therapeutic Activites, Therapeutic Exercise and Ultrasound     Monitor response to today's treatment and progress with PT POC as indicated and tolerated.    Ricarda Quintero, PT

## 2020-10-08 ENCOUNTER — OFFICE VISIT (OUTPATIENT)
Dept: ORTHOPEDICS | Facility: CLINIC | Age: 25
End: 2020-10-08
Payer: COMMERCIAL

## 2020-10-08 VITALS
BODY MASS INDEX: 27.79 KG/M2 | DIASTOLIC BLOOD PRESSURE: 75 MMHG | HEART RATE: 103 BPM | SYSTOLIC BLOOD PRESSURE: 121 MMHG | HEIGHT: 62 IN | WEIGHT: 151 LBS

## 2020-10-08 DIAGNOSIS — M25.511 RIGHT SHOULDER PAIN, UNSPECIFIED CHRONICITY: ICD-10-CM

## 2020-10-08 DIAGNOSIS — S43.432D TEAR OF LEFT GLENOID LABRUM, SUBSEQUENT ENCOUNTER: Primary | ICD-10-CM

## 2020-10-08 DIAGNOSIS — M25.512 LEFT SHOULDER PAIN, UNSPECIFIED CHRONICITY: ICD-10-CM

## 2020-10-08 PROCEDURE — 99999 PR PBB SHADOW E&M-EST. PATIENT-LVL III: ICD-10-PCS | Mod: PBBFAC,,, | Performed by: PHYSICAL MEDICINE & REHABILITATION

## 2020-10-08 PROCEDURE — 3008F BODY MASS INDEX DOCD: CPT | Mod: CPTII,S$GLB,, | Performed by: PHYSICAL MEDICINE & REHABILITATION

## 2020-10-08 PROCEDURE — 99213 PR OFFICE/OUTPT VISIT, EST, LEVL III, 20-29 MIN: ICD-10-PCS | Mod: S$GLB,,, | Performed by: PHYSICAL MEDICINE & REHABILITATION

## 2020-10-08 PROCEDURE — 99999 PR PBB SHADOW E&M-EST. PATIENT-LVL III: CPT | Mod: PBBFAC,,, | Performed by: PHYSICAL MEDICINE & REHABILITATION

## 2020-10-08 PROCEDURE — 99213 OFFICE O/P EST LOW 20 MIN: CPT | Mod: S$GLB,,, | Performed by: PHYSICAL MEDICINE & REHABILITATION

## 2020-10-08 PROCEDURE — 3008F PR BODY MASS INDEX (BMI) DOCUMENTED: ICD-10-PCS | Mod: CPTII,S$GLB,, | Performed by: PHYSICAL MEDICINE & REHABILITATION

## 2020-10-08 NOTE — PROGRESS NOTES
SPORTS MEDICINE / PM&R Follow Up Visit :    Referring Physician: No ref. provider found    Chief Complaint   Patient presents with    Left Shoulder - Follow-up       HPI: This is a 24 y.o.  female being seen in clinic today for evaluation of Follow-up of the Left Shoulder   Since last visit, the symptoms are improving.  She has been to therapy at [facility]. She's continued advancing her exercises and is slowly getting back into gymnastics as well. She notes last visit she was about 50% back to normal and today feels 70% back to normal.    History obtained from patient.    Past family, medical, social, surgical history, and vital signs reviewed in chart.    Review of Systems   Constitutional: Negative for chills, fever and weight loss.   HENT: Negative for hearing loss and sore throat.    Eyes: Negative for blurred vision, photophobia and pain.   Respiratory: Negative for shortness of breath.    Cardiovascular: Negative for chest pain.   Gastrointestinal: Negative for abdominal pain.   Genitourinary: Negative for dysuria.   Skin: Negative for rash.   Neurological: Negative for tingling and headaches.   Endo/Heme/Allergies: Does not bruise/bleed easily.   Psychiatric/Behavioral: Negative for depression.       General    Nursing note and vitals reviewed.  Constitutional: She is oriented to person, place, and time. She appears well-developed and well-nourished.   HENT:   Head: Normocephalic and atraumatic.   Eyes: Conjunctivae and EOM are normal. Pupils are equal, round, and reactive to light.   Neck: Neck supple.   Cardiovascular: Intact distal pulses.    Pulmonary/Chest: Effort normal. No respiratory distress.   Abdominal: She exhibits no distension.   Neurological: She is alert and oriented to person, place, and time. She has normal reflexes.   Psychiatric: She has a normal mood and affect.         Right Shoulder Exam   Right shoulder exam is normal.    Inspection/Observation   Scars: absent  Scapular Winging:  absent  Scapular Dyskinesia: negative    Range of Motion   Active abduction: normal   Passive abduction: normal   Extension: normal   Forward Flexion: normal   Adduction: normal  External Rotation 0 degrees: normal   Internal rotation 0 degrees: normal     Muscle Strength   The patient has normal right shoulder strength.    Tests & Signs   Apprehension: negative  Henderson test: negative  Impingement: negative  Sulcus: absent  Speed's Test: negative  Jerk Test: negative    Other   Sensation: normal    Left Shoulder Exam     Inspection/Observation   Scars: absent  Scapular Winging: absent  Scapular Dyskinesia: positive    Tenderness   The patient is tender to palpation of the greater tuberosity.    Range of Motion   Active abduction: normal   Passive abduction: normal   Extension: normal   Forward Flexion: normal   Adduction: normal  External Rotation 0 degrees: normal   Internal rotation 0 degrees: normal     Muscle Strength   The patient has normal left shoulder strength.    Tests & Signs   Apprehension: negative  Henderson test: positive  Impingement: negative  Sulcus: absent  Rotator Cuff Painful Arc/Range: mild  Lift Off Sign: negative  Active Compression test (Carson's Sign): negative  Speed's Test: negative  Jerk Test: negative    Other   Sensation: normal       Reflexes     Left Side  Biceps:  2+  Brachioradialis:  2+  Left Hale's Sign:  Absent    Right Side   Biceps:  2+  Brachioradialis:  2+  Right Hale's Sign:  absent    Vascular Exam     Right Pulses      Radial:                    2+      Left Pulses      Radial:                    2+      Capillary Refill  Right Hand: normal capillary refill  Left Hand: normal capillary refill        IMPRESSION/PLAN: This is a 24 y.o.  female with:    Tear of left glenoid labrum, subsequent encounter    Left shoulder pain, unspecified chronicity    Right shoulder pain, unspecified chronicity        The findings were discussed with Swati in detail. She will have a  few more sessions of PT with Ricarda. She'll continue progressing her exercises and adjust her HEP. She is overall happy with her progress. We discussed gradually increasing her activities overall.   She was provided with this plan in writing. All of her questions were answered. She will follow up with me KAILA.     Vianey Zamora M.D.  Sports Medicine

## 2020-10-12 ENCOUNTER — OFFICE VISIT (OUTPATIENT)
Dept: FAMILY MEDICINE | Facility: CLINIC | Age: 25
End: 2020-10-12
Payer: COMMERCIAL

## 2020-10-12 VITALS
DIASTOLIC BLOOD PRESSURE: 78 MMHG | BODY MASS INDEX: 27.2 KG/M2 | TEMPERATURE: 99 F | RESPIRATION RATE: 16 BRPM | HEART RATE: 87 BPM | OXYGEN SATURATION: 97 % | WEIGHT: 148.69 LBS | SYSTOLIC BLOOD PRESSURE: 112 MMHG

## 2020-10-12 DIAGNOSIS — E04.9 GOITER: ICD-10-CM

## 2020-10-12 DIAGNOSIS — Z00.00 ANNUAL PHYSICAL EXAM: Primary | ICD-10-CM

## 2020-10-12 PROCEDURE — 99395 PR PREVENTIVE VISIT,EST,18-39: ICD-10-PCS | Mod: S$GLB,,, | Performed by: FAMILY MEDICINE

## 2020-10-12 PROCEDURE — 99395 PREV VISIT EST AGE 18-39: CPT | Mod: S$GLB,,, | Performed by: FAMILY MEDICINE

## 2020-10-12 PROCEDURE — 99999 PR PBB SHADOW E&M-EST. PATIENT-LVL IV: CPT | Mod: PBBFAC,,, | Performed by: FAMILY MEDICINE

## 2020-10-12 PROCEDURE — 99999 PR PBB SHADOW E&M-EST. PATIENT-LVL IV: ICD-10-PCS | Mod: PBBFAC,,, | Performed by: FAMILY MEDICINE

## 2020-10-12 NOTE — PROGRESS NOTES
HISTORY OF PRESENT ILLNESS: Ms. Durán comes in today for annual wellness examination.  She is not fasting and took Tylenol only this morning.     END OF LIFE DECISION: She does not have a living will. She is not sure about her desire for life support.    Current Outpatient Medications   Medication Sig    naproxen (NAPROSYN) 500 MG tablet Take 1 tablet (500 mg total) by mouth 2 (two) times daily with meals.    nystatin-triamcinolone (MYCOLOG II) cream Apply topically 2 (two) times daily. X 10-14 days       SCREENINGS:    LDLCALC: Never.   Colonoscopy: Never.   Mammogram: Never.   Dexa Scan: Never.  GYN Exam (breast): In the past per patient.  Pap smear due.  Eye Exam: 2020 per patient. She wears glasses.  HIVAb: Never. She desires.  HCVAb: Never. She desires.  PPD: Negative in the patient.      Immunizations:    Flu shot: September 23, 2020 at work.  HPV series: June 13, 2008, August 18, 2008, August 2, 2009.  HBV series: March 13, 2020, April 29, 2020, September 23, 2020.  Td: July 27, 2020.                            ROS:  GENERAL: Denies fever, chills, fatigue or unusual weight change. Appetite normal. Exercises 4 to 5 times per week, 1 hour each time. Monitors diet usually  SKIN: Denies rashes, itching, changes in mole, color or texture of skin or easy bruising.  HEAD:  Denies headaches or recent head trauma.  EYES: Denies change in vision, pain, diplopia, redness or discharge.  EARS: Denies ear pain, discharge, vertigo or decreased hearing.  NOSE: Denies loss of smell, epistaxis or rhinitis.  MOUTH & THROAT: Denies hoarseness or change in voice. Denies excessive gum bleeding or mouth sores.  Denies sore throat.  NODES: Denies swollen glands.  CHEST: Denies BARGER, wheezing, cough, or sputum production.  CARDIOVASCULAR: Denies chest pain, PND, orthopnea or reduced exercise tolerance.  Denies palpitations.  ABDOMEN: Denies diarrhea, constipation, nausea, vomiting, abdominal pain, or blood in stool.  URINARY:  Denies flank pain, dysuria or hematuria.  GENITOURINARY: Denies flank pain, dysuria, frequency or hematuria. No change in menses. Performs monthly breast exams does.  ENDOCRINE: Denies diabetes, thyroid, cholesterol problems. Performs home glucose checks  - N./A.  HEME/LYMPH: Denies bleeding problems.  PERIPHERAL VASCULAR:Denies claudication or cyanosis.  MUSCULOSKELETAL: Denies joint stiffness, pain or swelling. Denies edema. Except currently receives physical therapy for shoulder pains and follows with Dr. Zamora, Sports medicine.  NEUROLOGIC: Denies history of seizures, tremors, paralysis, alteration of gait or coordination.  PSYCHIATRIC: Denies mood swings, depression, anxiety, homicidal or suicidal thoughts. Denies sleep problems.    PE:   VS: /78   Pulse 87   Temp 99 °F (37.2 °C) (Temporal)   Resp 16   Wt 67.4 kg (148 lb 11.2 oz)   LMP 10/09/2020 Comment: Monthly  SpO2 97%   BMI 27.20 kg/m²   APPEARANCE:  Well nourished, well developed female, pleasant and overweight, alert and oriented in no acute distress.    HEAD: Nontender. Full range of motion.  EYES: PERRL, conjunctiva pink, lids no edema.   EARS: External canal patent, no swelling or redness. TM's shiny and clear.  NOSE: Mucosa and turbinates pink, not swollen. No discharge. Nontender sinuses.  THROAT: No pharyngeal erythema or exudate. No stridor.   NECK: Supple, no mass. Non tender thyroid enlarged noted.  NODES: No cervical lymph node enlargement.  CHEST: Normal respiratory effort. Lungs clear to auscultation.  CARDIOVASCULAR: Normal S1, S2. No rubs, murmurs or gallops. PMI not displaced. No carotid bruit. Pedal pulses palpable bilaterally. No edema.  ABDOMEN: Bowel sounds present. Not distended. Soft. No tenderness, masses or organomegaly.  BREAST EXAM: Not examined.  PELVIC EXAM: Not examined  RECTAL EXAM: Not examined.  MUSCULOSKELETAL: No joint deformities or stiffness. She is ambulatory without problems.  SKIN: No rashes or suspicious  lesions, normal color and turgor.  NEUROLOGIC:   Cranial Nerves: II-XII grossly intact.   DTR's: Knees, Ankles 2+ and equal bilaterally. Gait & Posture: Normal gait and fine motion.  PSYCHIATRIC:Patient alert, oriented x 3. Mood/Affect normal without acute anxiety or depression noted. Judgment/insight good as she makes appropriate decisions during today's exam.     ASSESSMENT:    ICD-10-CM ICD-9-CM    1. Annual physical exam  Z00.00 V70.0 CBC auto differential      TSH      Comprehensive Metabolic Panel      Lipid Panel      HIV 1/2 Ag/Ab (4th Gen)      Hepatitis C Antibody   2. Goiter  E04.9 240.9 US Soft Tissue Head Neck Thyroid     PLAN:  1. Age-appropriate counseling-appropriate low-sodium, low-cholesterol, low carbohydrate diet and exercise daily, monthly breast self exam, annual wellness examination.   2. Patient advised to call for results.  3. Continue current medications.  4. Keep follow up with specialists.  5. Follow up in about 2 days (around 10/14/2020) for GYN exam (breast and pap smear only).

## 2020-10-14 ENCOUNTER — OFFICE VISIT (OUTPATIENT)
Dept: FAMILY MEDICINE | Facility: CLINIC | Age: 25
End: 2020-10-14
Payer: COMMERCIAL

## 2020-10-14 ENCOUNTER — LAB VISIT (OUTPATIENT)
Dept: LAB | Facility: HOSPITAL | Age: 25
End: 2020-10-14
Payer: COMMERCIAL

## 2020-10-14 VITALS
DIASTOLIC BLOOD PRESSURE: 72 MMHG | TEMPERATURE: 98 F | WEIGHT: 147.81 LBS | HEART RATE: 60 BPM | BODY MASS INDEX: 26.19 KG/M2 | OXYGEN SATURATION: 99 % | SYSTOLIC BLOOD PRESSURE: 110 MMHG | HEIGHT: 63 IN

## 2020-10-14 DIAGNOSIS — Z00.00 ANNUAL PHYSICAL EXAM: ICD-10-CM

## 2020-10-14 DIAGNOSIS — E04.9 GOITER: ICD-10-CM

## 2020-10-14 DIAGNOSIS — Z01.419 WELL WOMAN EXAM WITH ROUTINE GYNECOLOGICAL EXAM: Primary | ICD-10-CM

## 2020-10-14 LAB
ALBUMIN SERPL BCP-MCNC: 4.6 G/DL (ref 3.5–5.2)
ALP SERPL-CCNC: 61 U/L (ref 55–135)
ALT SERPL W/O P-5'-P-CCNC: 15 U/L (ref 10–44)
ANION GAP SERPL CALC-SCNC: 9 MMOL/L (ref 8–16)
AST SERPL-CCNC: 21 U/L (ref 10–40)
BASOPHILS # BLD AUTO: 0.02 K/UL (ref 0–0.2)
BASOPHILS NFR BLD: 0.6 % (ref 0–1.9)
BILIRUB SERPL-MCNC: 1.3 MG/DL (ref 0.1–1)
BUN SERPL-MCNC: 10 MG/DL (ref 6–20)
CALCIUM SERPL-MCNC: 9.8 MG/DL (ref 8.7–10.5)
CHLORIDE SERPL-SCNC: 106 MMOL/L (ref 95–110)
CHOLEST SERPL-MCNC: 174 MG/DL (ref 120–199)
CHOLEST/HDLC SERPL: 2.5 {RATIO} (ref 2–5)
CO2 SERPL-SCNC: 28 MMOL/L (ref 23–29)
CREAT SERPL-MCNC: 1.1 MG/DL (ref 0.5–1.4)
DIFFERENTIAL METHOD: ABNORMAL
EOSINOPHIL # BLD AUTO: 0 K/UL (ref 0–0.5)
EOSINOPHIL NFR BLD: 0 % (ref 0–8)
ERYTHROCYTE [DISTWIDTH] IN BLOOD BY AUTOMATED COUNT: 12.8 % (ref 11.5–14.5)
EST. GFR  (AFRICAN AMERICAN): >60 ML/MIN/1.73 M^2
EST. GFR  (NON AFRICAN AMERICAN): >60 ML/MIN/1.73 M^2
GLUCOSE SERPL-MCNC: 87 MG/DL (ref 70–110)
HCT VFR BLD AUTO: 41.2 % (ref 37–48.5)
HDLC SERPL-MCNC: 70 MG/DL (ref 40–75)
HDLC SERPL: 40.2 % (ref 20–50)
HGB BLD-MCNC: 12.7 G/DL (ref 12–16)
IMM GRANULOCYTES # BLD AUTO: 0 K/UL (ref 0–0.04)
IMM GRANULOCYTES NFR BLD AUTO: 0 % (ref 0–0.5)
LDLC SERPL CALC-MCNC: 95.4 MG/DL (ref 63–159)
LYMPHOCYTES # BLD AUTO: 1.9 K/UL (ref 1–4.8)
LYMPHOCYTES NFR BLD: 57 % (ref 18–48)
MCH RBC QN AUTO: 29.6 PG (ref 27–31)
MCHC RBC AUTO-ENTMCNC: 30.8 G/DL (ref 32–36)
MCV RBC AUTO: 96 FL (ref 82–98)
MONOCYTES # BLD AUTO: 0.4 K/UL (ref 0.3–1)
MONOCYTES NFR BLD: 11.9 % (ref 4–15)
NEUTROPHILS # BLD AUTO: 1 K/UL (ref 1.8–7.7)
NEUTROPHILS NFR BLD: 30.5 % (ref 38–73)
NONHDLC SERPL-MCNC: 104 MG/DL
NRBC BLD-RTO: 0 /100 WBC
PLATELET # BLD AUTO: 153 K/UL (ref 150–350)
PMV BLD AUTO: 12.4 FL (ref 9.2–12.9)
POTASSIUM SERPL-SCNC: 4.9 MMOL/L (ref 3.5–5.1)
PROT SERPL-MCNC: 7.9 G/DL (ref 6–8.4)
RBC # BLD AUTO: 4.29 M/UL (ref 4–5.4)
SODIUM SERPL-SCNC: 143 MMOL/L (ref 136–145)
TRIGL SERPL-MCNC: 43 MG/DL (ref 30–150)
TSH SERPL DL<=0.005 MIU/L-ACNC: 0.73 UIU/ML (ref 0.4–4)
WBC # BLD AUTO: 3.28 K/UL (ref 3.9–12.7)

## 2020-10-14 PROCEDURE — 85025 COMPLETE CBC W/AUTO DIFF WBC: CPT

## 2020-10-14 PROCEDURE — 86703 HIV-1/HIV-2 1 RESULT ANTBDY: CPT

## 2020-10-14 PROCEDURE — 36415 COLL VENOUS BLD VENIPUNCTURE: CPT | Mod: PO

## 2020-10-14 PROCEDURE — 80053 COMPREHEN METABOLIC PANEL: CPT

## 2020-10-14 PROCEDURE — 87624 HPV HI-RISK TYP POOLED RSLT: CPT

## 2020-10-14 PROCEDURE — 86803 HEPATITIS C AB TEST: CPT

## 2020-10-14 PROCEDURE — 88175 CYTOPATH C/V AUTO FLUID REDO: CPT

## 2020-10-14 PROCEDURE — 80061 LIPID PANEL: CPT

## 2020-10-14 PROCEDURE — 99395 PREV VISIT EST AGE 18-39: CPT | Mod: S$GLB,,, | Performed by: FAMILY MEDICINE

## 2020-10-14 PROCEDURE — 99999 PR PBB SHADOW E&M-EST. PATIENT-LVL III: ICD-10-PCS | Mod: PBBFAC,,, | Performed by: FAMILY MEDICINE

## 2020-10-14 PROCEDURE — 99999 PR PBB SHADOW E&M-EST. PATIENT-LVL III: CPT | Mod: PBBFAC,,, | Performed by: FAMILY MEDICINE

## 2020-10-14 PROCEDURE — 99395 PR PREVENTIVE VISIT,EST,18-39: ICD-10-PCS | Mod: S$GLB,,, | Performed by: FAMILY MEDICINE

## 2020-10-14 PROCEDURE — 84443 ASSAY THYROID STIM HORMONE: CPT

## 2020-10-14 NOTE — PROGRESS NOTES
Swatisen Durán    Chief Complaint   Patient presents with    Gynecologic Exam       History of Present Illness:   Ms. Durán comes in today for annual female wellness examination.  She states she performs monthly breast self examinations.  She states she has normal monthly menses.  She states she has never had a pap smear. She states she has been screening for Chlamydia and Gonorrhea in the past (while in college). She states she is currently not sexually active but does use condoms when active.  She is fasting and reports no acute problems today.      Current Outpatient Medications   Medication Sig    naproxen (NAPROSYN) 500 MG tablet Take 1 tablet (500 mg total) by mouth 2 (two) times daily with meals.         Review of Systems   Constitutional: Negative.    Respiratory: Negative.    Cardiovascular: Negative.    Gastrointestinal: Negative.    Genitourinary: Negative.  Negative for menstrual problem.   Musculoskeletal: Negative.    Skin: Negative.    Neurological: Negative.    Psychiatric/Behavioral: Negative.        Objective:  Physical Exam  Vitals signs reviewed.   Constitutional:       General: She is not in acute distress.     Appearance: Normal appearance. She is normal weight. She is not ill-appearing, toxic-appearing or diaphoretic.   Neck:      Musculoskeletal: Normal range of motion and neck supple. No muscular tenderness.      Comments: Non tender enlarged thyroid (scheduled for thyroid ultrasound).  Cardiovascular:      Rate and Rhythm: Normal rate and regular rhythm.      Pulses: Normal pulses.      Heart sounds: Normal heart sounds. No murmur.   Pulmonary:      Effort: Pulmonary effort is normal. No respiratory distress.      Breath sounds: Normal breath sounds. No wheezing.   Abdominal:      General: Bowel sounds are normal. There is no distension.      Palpations: Abdomen is soft. There is no mass.      Tenderness: There is no abdominal tenderness. There is no guarding or rebound.       Hernia: There is no hernia in the left inguinal area or right inguinal area.   Genitourinary:     General: Normal vulva.      Labia:         Right: No rash, tenderness, lesion or injury.         Left: No rash, tenderness, lesion or injury.       Urethra: No prolapse, urethral pain, urethral swelling or urethral lesion.      Vagina: Normal.      Cervix: No cervical motion tenderness, discharge, friability, lesion or erythema.      Uterus: Normal. Not enlarged and not tender.       Adnexa: Right adnexa normal and left adnexa normal.        Right: No mass or tenderness.          Left: No mass.     Musculoskeletal: Normal range of motion.         General: No swelling or tenderness.      Comments: She is ambulatory without problems.   Lymphadenopathy:      Cervical: No cervical adenopathy.      Lower Body: No right inguinal adenopathy. No left inguinal adenopathy.   Skin:     Findings: No rash.   Neurological:      General: No focal deficit present.      Mental Status: She is alert and oriented to person, place, and time.   Psychiatric:         Mood and Affect: Mood normal.         Behavior: Behavior normal.         Thought Content: Thought content normal.         Judgment: Judgment normal.         ASSESSMENT:  1. Well woman exam with routine gynecological exam    2. Goiter        PLAN:  Swati was seen today for gynecologic exam.    Diagnoses and all orders for this visit:    Well woman exam with routine gynecological exam  -     Liquid-Based Pap Smear, Screening  -     HPV High Risk Genotypes, PCR    Goiter       Patient advised to call for results.  Continue current medications, follow low sodium, low cholesterol, low carb diet, daily walks.  Continue monthly breast self examinations.  Safe sex practices advised.  Follow up in about 1 year (around 10/14/2021) for physical.

## 2020-10-15 LAB
HCV AB SERPL QL IA: NEGATIVE
HIV 1+2 AB+HIV1 P24 AG SERPL QL IA: NEGATIVE

## 2020-10-22 ENCOUNTER — PATIENT MESSAGE (OUTPATIENT)
Dept: REHABILITATION | Facility: HOSPITAL | Age: 25
End: 2020-10-22

## 2020-10-27 LAB
HPV HR 12 DNA SPEC QL NAA+PROBE: POSITIVE
HPV16 AG SPEC QL: NEGATIVE
HPV18 DNA SPEC QL NAA+PROBE: NEGATIVE

## 2020-10-28 ENCOUNTER — CLINICAL SUPPORT (OUTPATIENT)
Dept: REHABILITATION | Facility: HOSPITAL | Age: 25
End: 2020-10-28
Payer: COMMERCIAL

## 2020-10-28 DIAGNOSIS — M25.512 CHRONIC LEFT SHOULDER PAIN: ICD-10-CM

## 2020-10-28 DIAGNOSIS — G89.29 CHRONIC LEFT SHOULDER PAIN: ICD-10-CM

## 2020-10-28 DIAGNOSIS — R53.1 DECREASED STRENGTH: ICD-10-CM

## 2020-10-28 PROCEDURE — 97140 MANUAL THERAPY 1/> REGIONS: CPT

## 2020-10-28 PROCEDURE — 97110 THERAPEUTIC EXERCISES: CPT

## 2020-10-28 NOTE — PROGRESS NOTES
Physical Therapy Treatment Note     Name: Swati Durán  Clinic Number: [id number]    Therapy Diagnosis:   Encounter Diagnoses   Name Primary?    Decreased strength     Chronic left shoulder pain        Physician: Vianey Zamora MD    Visit Date: 10/28/2020    Physician Orders: PT Eval and Treat  Medical Diagnosis from Referral: chronic left shoulder pain  Evaluation Date: 7/28/2020  Authorization Period Expiration: 07/19/2021  Plan of Care Expiration: 12/25/2020  Visit # / Visits authorized: 12/20 (total visits 13) (Insurance 50 then authorization)     PROGRESS NOTE: 11/28/2020    Time In: 9:45 am  Time Out: 10:35 pm  Total Billable Time: 40 minutes    Precautions: Standard and hypermobility    Subjective     Pt reports: See POC update    She was compliant with home exercise program.  Response to previous treatment: None  Functional change: None    Pain: 0/10 today, more soreness then pain  Location: right shoulder, anterior and posterior aspects and within the joint    Objective         Swati received therapeutic exercises to develop strength, endurance and core stabilization for 25 minutes including:    Prone LT isometrics with lats 2/10x, on swiss ball  Prone scap rows 2/10x, on swiss ball 2#  Prone horizontal ABD (on mat)  2/10x  spidermans small circles CCW/CW green theraband, middle circles 5x/ea B,   ina V B, no abd,  2/10x red theraband  Quadruped with leg slide and lower trap activation 5x2/ea  Dead bugs on wall with OH reach 10x2, 4# MB  Wall plank on mat 10s x3, extended arms    Defer:  Quadruped ball push with UE reach 5x/each arm (unweighting only)  ABC's  1x/ea 5# KB bottoms up, bilateral  Serratus punch, 10x 5# KB bottoms up, bilateral  Plank on elbows/knees 5s x3   Subscapularis isolation theraband yellow 15x/ea   Extended arms plank  1x 5s  Quadruped with core activation, 5s hold 2 reps of hold then rest, 4 sets     Swati received the following manual therapy techniques:  Myofacial release, Soft tissue Mobilization and manual releases were applied to the: lateral/medial scapular musculature and lats/infraspinatus, upper trapezial/levator scapular musculature for 15 minutes pre-treatment/post treatment.      Home Exercises Provided and Patient Education Provided     Education provided:   - HEP, scapular stability    Written Home Exercises Provided: Patient instructed to cont prior HEP.  Exercises were reviewed and Swati was able to demonstrate them prior to the end of the session.  Swati demonstrated good  understanding of the education provided.     See EMR under Patient Instructions for exercises provided prior visit.    Assessment     See POC update    Swati is progressing well towards her goals.   Pt prognosis is Good.     Pt will continue to benefit from skilled outpatient physical therapy to address the deficits listed in the problem list box on initial evaluation, provide pt/family education and to maximize pt's level of independence in the home and community environment.     Pt's spiritual, cultural and educational needs considered and pt agreeable to plan of care and goals.     Anticipated barriers to physical therapy: history of repetitive dislocations    Goals:  Short Term Goals: In 4 weeks   1. Patient to be educated on HEP. MET (9/4/2020)  2. Patient to improve score on the FOTO to progress toward goal of returning to functional and recreational activities.  Not MET (9/4/2020)  3. Patient to have pain less than 4/10 at worst in order to improve QOL.  MET (9/4/2020)  4. Patient to improve strength by 1/2 MMT grade in L UE without pain to improve use of L UE for functional activities.  MET (9/4/2020)  5. Patient will demonstrate improved scapular mobility to improve shoulder mechanics and decrease pain during ADLs. MET (10/28/2020)  6. Patient to be educated on postural and body mechanics awareness.  MET (9/4/2020)     Long Term Goals: In 8 weeks  1. Patient will  demonstrate independence in HEP with minimal cueing in order to ensure maintenance of gains.  MET (9/4/2020)  2. Patient to improve score on FOTO to 10/100 or less to achieve goal of returning to functional and recreational activities. progressing not MET (10/28/2020)  3. Patient to have decreased pain to 1/10 at worst in order to improve QOL.  Not MET (10/28/2020)  4. Patient to improve B UE strength to goals stated above to improve use of B UE for functional activities.   Progressing not MET (10/28/2020)  5. Patient to demo increase L shoulder AROM to goals stated above to improve ability to use L UE for ADLs.   MET (10/28/2020)  6. Patient to perform work and recreational activities including tumbling, stunting, and front and lateral weighted raises without increased symptoms. progressing not MET (10/28/2020)        Plan     Updated Certification Period: 10/28/2020 to 12/25/2020  Recommended Treatment Plan: 1 times per week for 8 weeks: Electrical Stimulation PRN, Manual Therapy, Moist Heat/ Ice, Neuromuscular Re-ed, Patient Education, Self Care, Therapeutic Activites, Therapeutic Exercise and FDN/PRN    Monitor response to today's treatment and progress with PT POC as indicated and tolerated.    Ricarda Quintero, PT

## 2020-10-28 NOTE — PLAN OF CARE
Outpatient Therapy Updated Plan of Care     Visit Date: 10/28/2020  Name: Swati Durán  Clinic Number: [id number]    Therapy Diagnosis:   Encounter Diagnoses   Name Primary?   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 Is not able to  anything heavy. Limited with tumbling.    Subjective     Update: Pt returns to therapy - she has not been doing her exercises the last 2 weeks due to a death in the family.  She reports that she isn't having any pain or subluxations, and was released by Dr Zamora.    Objective     Update:   easurements:  Shoulder AROM Right  7/28/2020 Left  7/28/2020 Right  10/2/2020 Left  10/2/2020 Bilateral   10/28/2020   Shoulder Flexion (180) Full 170 160 170 WNL   Shoulder Extension (60) 50 55 60 60 WNL   Shoulder Abduction (180) 162 110 p! 165 170 WNL   Shoulder ER (90) R = L, WFL -- T3 T3 WNL   Shoulder IR (70) R = L, WFL -- Mid thoracic spine Mid thoracic spine WNL        Shoulder AROM Goal   Shoulder Flexion (180) 180   Shoulder Extension (60) 60   Shoulder Abduction (180) 165 B   Shoulder ER (90) N/A   Shoulder IR (70) N/A      STRENGTH:     U/E MMT Right  7/28/2020 Left  7/28/2020 Right   10/2/2020 Left  10/2/2020 Right  10/28/2020 Left  10/28/2020   Cervical SB 4-/5 4-/5 4/5 4/5 4+ 4+   Shoulder Elevation 4+/5 4+/5 NT NT NT NT   Shoulder Flexion 4/5 4/5 4+/5 (arm at side) 4+/5 (arm at side) NT NT   Shoulder Extension 4+/5 4/5 4+/5 (arm at side) 4+/5 (arm at side) NT NT   Shoulder Abduction 4+/5 4/5 4+/5  4+/5 4+/5 4+/5   Shoulder IR 4/5 4-/5 4+/5 (arm at side) 4+/5 (arm at side) 4/5-4+/5 4/5-4+/5   Shoulder ER 4/5 4-/5 4+/5 (arm at side) 4+/5 (arm at side) 4/5-4+/5 4/5-4+/5   Elbow Flexion  4+/5 4/5 4+/5  4+/5 4+/5 4+/5   Elbow Extension 4+/5 4/5 NT NT 4+/5 4+/5   serrtaus anterior NT NT 4/5 4/5 4/5 4-/5   supraspinatus NT NT 4/5 4/5 4/5 4/5     FOTO:      Assessment     Update: Pt continues to make gains in her strength and her ROM is now WNL's.  She continues to rate herself as improved on her functional scoring.  After discussion we will move to one time per week to progress with HEP.  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    Short Term Goals: In 4 weeks   1. Patient to be educated on HEP. MET (9/4/2020)  2. Patient to improve score on the FOTO to progress toward goal of returning to functional and recreational activities.  Not MET (9/4/2020)  3. Patient to have pain less than 4/10 at worst in order to improve QOL.  MET (9/4/2020)  4. Patient to improve strength by 1/2 MMT grade in L UE without pain to improve use of L UE for functional activities.  MET (9/4/2020)  5. Patient will demonstrate improved scapular mobility to improve shoulder mechanics and decrease pain during ADLs. MET (10/28/2020)  6. Patient to be educated on postural and body mechanics awareness.  MET (9/4/2020)     Long Term Goals: In 8 weeks  1. Patient will demonstrate independence in HEP with minimal cueing in order to ensure maintenance of gains.  MET (9/4/2020)  2. Patient to improve score on FOTO to 10/100 or less to achieve goal of returning to functional and recreational activities. progressing not MET (10/28/2020)  3. Patient to have decreased pain to 1/10 at worst in order to improve QOL.  Not MET (10/28/2020)  4. Patient to improve B UE strength to goals stated above to improve use of B UE for functional activities.   Progressing not MET  (10/28/2020)  5. Patient to demo increase L shoulder AROM to goals stated above to improve ability to use L UE for ADLs.   MET (10/28/2020)  6. Patient to perform work and recreational activities including tumbling, stunting, and front and lateral weighted raises without increased symptoms. progressing not MET (10/28/2020)         Previous Short Term Goals Status:   See ablve  New Short Term Goals Status:   See above  Long Term Goal Status:   continue per initial plan of care.  Reasons for Recertification of Therapy:   Continue to progress towards all goals, progress slower than anticipated due to hypermobility B UE.    Plan     Updated Certification Period: 10/28/2020 to 12/25/2020  Recommended Treatment Plan: 1 times per week for 8 weeks: Electrical Stimulation PRN, Manual Therapy, Moist Heat/ Ice, Neuromuscular Re-ed, Patient Education, Self Care, Therapeutic Activites, Therapeutic Exercise and FDN/PRN  Other Recommendations: None    Ricarda Quintero, PT  10/28/2020      I CERTIFY THE NEED FOR THESE SERVICES FURNISHED UNDER THIS PLAN OF TREATMENT AND WHILE UNDER MY CARE    Physician's comments:        Physician's Signature: ___________________________________________________

## 2020-11-06 ENCOUNTER — CLINICAL SUPPORT (OUTPATIENT)
Dept: REHABILITATION | Facility: HOSPITAL | Age: 25
End: 2020-11-06
Payer: COMMERCIAL

## 2020-11-06 DIAGNOSIS — M25.512 CHRONIC LEFT SHOULDER PAIN: ICD-10-CM

## 2020-11-06 DIAGNOSIS — R53.1 DECREASED STRENGTH: ICD-10-CM

## 2020-11-06 DIAGNOSIS — G89.29 CHRONIC LEFT SHOULDER PAIN: ICD-10-CM

## 2020-11-06 PROCEDURE — 97140 MANUAL THERAPY 1/> REGIONS: CPT

## 2020-11-06 PROCEDURE — 97110 THERAPEUTIC EXERCISES: CPT

## 2020-11-06 NOTE — PROGRESS NOTES
Physical Therapy Treatment Note     Name: Swati Durán  Clinic Number: [id number]    Therapy Diagnosis:   Encounter Diagnoses   Name Primary?    Decreased strength     Chronic left shoulder pain        Physician: Vianey Zamora MD    Visit Date: 11/6/2020    Physician Orders: PT Eval and Treat  Medical Diagnosis from Referral: chronic left shoulder pain  Evaluation Date: 7/28/2020  Authorization Period Expiration: 07/19/2021  Plan of Care Expiration: 12/25/2020  Visit # / Visits authorized: 13/20 (total visits 14) (Insurance 50 then authorization)     PROGRESS NOTE: 11/28/2020    Time In: 10:40 am  Time Out: 11:15 am  Total Billable Time: 35 minutes    Precautions: Standard and hypermobility    Subjective     Pt reports: Has been doing her exercises, using resistance band with rows.  Was able to do quadruped with increased ease at home.  Has not had any incidents of subluxation or dislocation.    She was compliant with home exercise program.  Response to previous treatment: None  Functional change: None    Pain: 0/10 today, more soreness then pain  Location: right shoulder, anterior and posterior aspects and within the joint    Objective         Swati received therapeutic exercises to develop strength, endurance and core stabilization for 25 minutes including:    Prone LT isometrics with lats 2/8x, 2# on swiss ball  Prone scap rows 2/10x, on swiss ball 2#  Prone horizontal ABD (on swiss ball)  2/8x  spidermans small circles CCW/CW green theraband, middle circles 5x/ea B,   ina V B, addition of abd 10x,  3/10x red theraband  Tabletop on MAT with OH reach 5x/3, 4# MB  Straddle position with legs, push into ground with hands and come up on toes hold 5s, 10x (pre-crow)    Defer:  Quadruped with leg slide and lower trap activation 5x2/ea   Plank on mat 10s x3, extended arms  Quadruped ball push with UE reach 5x/each arm (unweighting only)  ABC's  1x/ea 5# KB bottoms up, bilateral  Serratus punch, 10x  5# KB bottoms up, bilateral  Plank on elbows/knees 5s x3   Subscapularis isolation theraband yellow 15x/ea   Extended arms plank  1x 5s  Quadruped with core activation, 5s hold 2 reps of hold then rest, 4 sets     Swati received the following manual therapy techniques: Myofacial release, Soft tissue Mobilization and manual releases were applied to the: lateral/medial scapular musculature and lats/infraspinatus, upper trapezial/levator scapular musculature for 10 minutes pre-treatment/post treatment.      Home Exercises Provided and Patient Education Provided     Education provided:   - HEP, scapular stability    Written Home Exercises Provided: Patient instructed to cont prior HEP.  Exercises were reviewed and Swati was able to demonstrate them prior to the end of the session.  Swati demonstrated good  understanding of the education provided.     See EMR under Patient Instructions for exercises provided prior visit.    Assessment     Increased fatigue with more challenging closed chain exercises.  She tolerated all treatment well but did have some apprehension with theraband ABD - even with minimal ROM.  Decreased resistance and pt with improved reports, and less apprehension.    Swati is progressing well towards her goals.   Pt prognosis is Good.     Pt will continue to benefit from skilled outpatient physical therapy to address the deficits listed in the problem list box on initial evaluation, provide pt/family education and to maximize pt's level of independence in the home and community environment.     Pt's spiritual, cultural and educational needs considered and pt agreeable to plan of care and goals.     Anticipated barriers to physical therapy: history of repetitive dislocations    Goals:  Short Term Goals: In 4 weeks   1. Patient to be educated on HEP. MET (9/4/2020)  2. Patient to improve score on the FOTO to progress toward goal of returning to functional and recreational activities.  Not MET  (9/4/2020)  3. Patient to have pain less than 4/10 at worst in order to improve QOL.  MET (9/4/2020)  4. Patient to improve strength by 1/2 MMT grade in L UE without pain to improve use of L UE for functional activities.  MET (9/4/2020)  5. Patient will demonstrate improved scapular mobility to improve shoulder mechanics and decrease pain during ADLs. MET (10/28/2020)  6. Patient to be educated on postural and body mechanics awareness.  MET (9/4/2020)     Long Term Goals: In 8 weeks  1. Patient will demonstrate independence in HEP with minimal cueing in order to ensure maintenance of gains.  MET (9/4/2020)  2. Patient to improve score on FOTO to 10/100 or less to achieve goal of returning to functional and recreational activities. progressing not MET (10/28/2020)  3. Patient to have decreased pain to 1/10 at worst in order to improve QOL.  Not MET (10/28/2020)  4. Patient to improve B UE strength to goals stated above to improve use of B UE for functional activities.   Progressing not MET (10/28/2020)  5. Patient to demo increase L shoulder AROM to goals stated above to improve ability to use L UE for ADLs.   MET (10/28/2020)  6. Patient to perform work and recreational activities including tumbling, stunting, and front and lateral weighted raises without increased symptoms. progressing not MET (10/28/2020)        Plan     Updated Certification Period: 10/28/2020 to 12/25/2020  Recommended Treatment Plan: 1 times per week for 8 weeks: Electrical Stimulation PRN, Manual Therapy, Moist Heat/ Ice, Neuromuscular Re-ed, Patient Education, Self Care, Therapeutic Activites, Therapeutic Exercise and FDN/PRN    Monitor response to today's treatment and progress with PT POC as indicated and tolerated.    Ricarda Quintero, PT

## 2020-11-11 LAB
FINAL PATHOLOGIC DIAGNOSIS: NORMAL
Lab: NORMAL

## 2020-11-23 ENCOUNTER — CLINICAL SUPPORT (OUTPATIENT)
Dept: REHABILITATION | Facility: HOSPITAL | Age: 25
End: 2020-11-23
Payer: COMMERCIAL

## 2020-11-23 DIAGNOSIS — G89.29 CHRONIC LEFT SHOULDER PAIN: ICD-10-CM

## 2020-11-23 DIAGNOSIS — R53.1 DECREASED STRENGTH: ICD-10-CM

## 2020-11-23 DIAGNOSIS — M25.512 CHRONIC LEFT SHOULDER PAIN: ICD-10-CM

## 2020-11-23 PROCEDURE — 97110 THERAPEUTIC EXERCISES: CPT

## 2020-11-23 PROCEDURE — 97140 MANUAL THERAPY 1/> REGIONS: CPT

## 2020-11-23 NOTE — PROGRESS NOTES
Physical Therapy Treatment Note     Name: Swati Durán  Clinic Number: [id number]    Therapy Diagnosis:   Encounter Diagnoses   Name Primary?    Decreased strength     Chronic left shoulder pain        Physician: Vianey Zamora MD    Visit Date: 11/23/2020    Physician Orders: PT Eval and Treat  Medical Diagnosis from Referral: chronic left shoulder pain  Evaluation Date: 7/28/2020  Authorization Period Expiration: 07/19/2021  Plan of Care Expiration: 12/25/2020  Visit # / Visits authorized: 14/20 (total visits 15) (Insurance 50 then authorization)     PROGRESS NOTE: 11/28/2020    Time In: 10:15 am  Time Out: 11:00 am  Total Billable Time: 44 minutes    Precautions: Standard and hypermobility    Subjective     Pt reports: No new issues, has continued to do HEP.  Quadruped continues to be difficult.    She was compliant with home exercise program.  Response to previous treatment: None  Functional change: None    Pain: 0/10 today, more soreness then pain  Location: right shoulder, anterior and posterior aspects and within the joint    Objective         Swati received therapeutic exercises to develop strength, endurance and core stabilization for 32 minutes including:    Prone LT isometrics with lats 2/8x, 0# on swiss ball  Prone scap rows 2/10x, on swiss ball 2#  Prone horizontal ABD (on swiss ball)  2/8x  spidermans small circles CCW/CW green theraband, middle circles, high circles 5x/ea B,   ina V B, addition of abd 10x,  3/10x red theraband  Tabletop on MAT with OH reach 5x/3, 4# MB  Straddle position with legs, push into ground with hands and come up on toes hold 5s, 10x (pre-crow)  Quadruped with leg slide and lower trap activation 5x/ea  Quadruped UE diagnol reach 5x/ea  Prone W's 5x/2    Defer:   Plank on mat 10s x3, extended arms  Quadruped ball push with UE reach 5x/each arm (unweighting only)  ABC's  1x/ea 5# KB bottoms up, bilateral  Serratus punch, 10x 5# KB bottoms up, bilateral  Plank  on elbows/knees 5s x3   Subscapularis isolation theraband yellow 15x/ea   Extended arms plank  1x 5s  Quadruped with core activation, 5s hold 2 reps of hold then rest, 4 sets     Swati received the following manual therapy techniques: Myofacial release, Soft tissue Mobilization and manual releases were applied to the: lateral/medial scapular musculature and lats/infraspinatus, upper trapezial/levator scapular musculature for 12 minutes pre-treatment/post treatment.      Home Exercises Provided and Patient Education Provided     Education provided:   - HEP, scapular stability    Written Home Exercises Provided: Patient instructed to cont prior HEP.  Exercises were reviewed and Swati was able to demonstrate them prior to the end of the session.  Swati demonstrated good  understanding of the education provided.     See EMR under Patient Instructions for exercises provided prior visit.    Assessment     Increased fatigue with more challenging closed chain exercises.  She continues to tolerate all progressions well but also with continued decreased endurance of scapular musculature and decreased strength as well.    Swati is progressing well towards her goals.   Pt prognosis is Good.     Pt will continue to benefit from skilled outpatient physical therapy to address the deficits listed in the problem list box on initial evaluation, provide pt/family education and to maximize pt's level of independence in the home and community environment.     Pt's spiritual, cultural and educational needs considered and pt agreeable to plan of care and goals.     Anticipated barriers to physical therapy: history of repetitive dislocations    Goals:  Short Term Goals: In 4 weeks   1. Patient to be educated on HEP. MET (9/4/2020)  2. Patient to improve score on the FOTO to progress toward goal of returning to functional and recreational activities.  Not MET (9/4/2020)  3. Patient to have pain less than 4/10 at worst in order to  improve QOL.  MET (9/4/2020)  4. Patient to improve strength by 1/2 MMT grade in L UE without pain to improve use of L UE for functional activities.  MET (9/4/2020)  5. Patient will demonstrate improved scapular mobility to improve shoulder mechanics and decrease pain during ADLs. MET (10/28/2020)  6. Patient to be educated on postural and body mechanics awareness.  MET (9/4/2020)     Long Term Goals: In 8 weeks  1. Patient will demonstrate independence in HEP with minimal cueing in order to ensure maintenance of gains.  MET (9/4/2020)  2. Patient to improve score on FOTO to 10/100 or less to achieve goal of returning to functional and recreational activities. progressing not MET (10/28/2020)  3. Patient to have decreased pain to 1/10 at worst in order to improve QOL.  Not MET (10/28/2020)  4. Patient to improve B UE strength to goals stated above to improve use of B UE for functional activities.   Progressing not MET (10/28/2020)  5. Patient to demo increase L shoulder AROM to goals stated above to improve ability to use L UE for ADLs.   MET (10/28/2020)  6. Patient to perform work and recreational activities including tumbling, stunting, and front and lateral weighted raises without increased symptoms. progressing not MET (10/28/2020)        Plan     Updated Certification Period: 10/28/2020 to 12/25/2020  Recommended Treatment Plan: 1 times per week for 8 weeks: Electrical Stimulation PRN, Manual Therapy, Moist Heat/ Ice, Neuromuscular Re-ed, Patient Education, Self Care, Therapeutic Activites, Therapeutic Exercise and FDN/PRN    Monitor response to today's treatment and progress with PT POC as indicated and tolerated.    Ricarda Quintero, PT

## 2020-12-01 ENCOUNTER — CLINICAL SUPPORT (OUTPATIENT)
Dept: REHABILITATION | Facility: HOSPITAL | Age: 25
End: 2020-12-01
Payer: COMMERCIAL

## 2020-12-01 DIAGNOSIS — R53.1 DECREASED STRENGTH: ICD-10-CM

## 2020-12-01 DIAGNOSIS — G89.29 CHRONIC LEFT SHOULDER PAIN: ICD-10-CM

## 2020-12-01 DIAGNOSIS — M25.512 CHRONIC LEFT SHOULDER PAIN: ICD-10-CM

## 2020-12-01 PROCEDURE — 97140 MANUAL THERAPY 1/> REGIONS: CPT

## 2020-12-01 PROCEDURE — 97110 THERAPEUTIC EXERCISES: CPT

## 2020-12-01 NOTE — PROGRESS NOTES
Physical Therapy Treatment Note     Name: Swati Durán  Clinic Number: [id number]    Therapy Diagnosis:   Encounter Diagnoses   Name Primary?    Decreased strength     Chronic left shoulder pain        Physician: Vianey Zamora MD    Visit Date: 12/1/2020    Physician Orders: PT Eval and Treat  Medical Diagnosis from Referral: chronic left shoulder pain  Evaluation Date: 7/28/2020  Authorization Period Expiration: 07/19/2021  Plan of Care Expiration: 12/25/2020  Visit # / Visits authorized: 15/20 (total visits 16) (Insurance 50 then authorization)     PROGRESS NOTE: 11/28/2020    Time In: 10:15 am  Time Out: 11:00 am  Total Billable Time: 38 minutes    Precautions: Standard and hypermobility    Subjective     Pt reports: No new issues, has continued to do HEP.  She worked out last Thursday and was very sore but not painful.  She reports functional goal of back-handspring.    She was compliant with home exercise program.  Response to previous treatment: None  Functional change: None    Pain: 0/10 today, more soreness then pain  Location: right shoulder, anterior and posterior aspects and within the joint    Objective         Swati received therapeutic exercises to develop strength, endurance and core stabilization for 25 minutes including:    Prone LT isometrics with lats 2/10x, 1# on swiss ball  Prone scap rows at 90 degrees ABD 10x, 8x on swiss ball 2#  Prone horizontal ABD (on swiss ball)  2/10x  ina V B, addition of abd,  2/8x green theraband  Tabletop on MAT with OH reach 5x/3, 4# MB  Straddle position with legs, push into ground with hands and come up on toes hold 5s, 10x (pre-crow)  Quadruped, bird dog 5x/2  Prone W's 8x/2, on swiss ball  Mat push ups 8x/2  5# KB to 90 press up and hold 5s, 5x/ea   plyo push off mat plank/push up position 10x    Plan for Next Visit: add KB to theraband, UE push back on theraball, BOSU planks, Y's at wall with theraband, handstand on wall         Defer:  spidermans small circles CCW/CW green theraband, middle circles, high circles 5x/ea B,   Quadruped with leg slide and lower trap activation 5x/ea  Quadruped UE diagnol reach 5x/ea   Plank on mat 10s x3, extended arms  Quadruped ball push with UE reach 5x/each arm (unweighting only)  ABC's  1x/ea 5# KB bottoms up, bilateral  Serratus punch, 10x 5# KB bottoms up, bilateral  Plank on elbows/knees 5s x3   Subscapularis isolation theraband yellow 15x/ea   Extended arms plank  1x 5s  Quadruped with core activation, 5s hold 2 reps of hold then rest, 4 sets    Swati received the following manual therapy techniques: Myofacial release, Soft tissue Mobilization and manual releases were applied to the: lateral/medial scapular musculature and lats/infraspinatus, upper trapezial/levator scapular musculature for 13 minutes pre-treatment/post treatment.      Home Exercises Provided and Patient Education Provided     Education provided:   - HEP, scapular stability    Written Home Exercises Provided: Patient instructed to cont prior HEP.  Exercises were reviewed and Swati was able to demonstrate them prior to the end of the session.  Swati demonstrated good  understanding of the education provided.     See EMR under Patient Instructions for exercises provided prior visit.    Assessment     Increased fatigue with more challenging closed chain exercises.  She continues to tolerate all progressions well.      Swati is progressing well towards her goals.   Pt prognosis is Good.     Pt will continue to benefit from skilled outpatient physical therapy to address the deficits listed in the problem list box on initial evaluation, provide pt/family education and to maximize pt's level of independence in the home and community environment.     Pt's spiritual, cultural and educational needs considered and pt agreeable to plan of care and goals.     Anticipated barriers to physical therapy: history of repetitive  dislocations    Goals:  Short Term Goals: In 4 weeks   1. Patient to be educated on HEP. MET (9/4/2020)  2. Patient to improve score on the FOTO to progress toward goal of returning to functional and recreational activities.  Not MET (9/4/2020)  3. Patient to have pain less than 4/10 at worst in order to improve QOL.  MET (9/4/2020)  4. Patient to improve strength by 1/2 MMT grade in L UE without pain to improve use of L UE for functional activities.  MET (9/4/2020)  5. Patient will demonstrate improved scapular mobility to improve shoulder mechanics and decrease pain during ADLs. MET (10/28/2020)  6. Patient to be educated on postural and body mechanics awareness.  MET (9/4/2020)     Long Term Goals: In 8 weeks  1. Patient will demonstrate independence in HEP with minimal cueing in order to ensure maintenance of gains.  MET (9/4/2020)  2. Patient to improve score on FOTO to 10/100 or less to achieve goal of returning to functional and recreational activities. progressing not MET (10/28/2020)  3. Patient to have decreased pain to 1/10 at worst in order to improve QOL.  Not MET (10/28/2020)  4. Patient to improve B UE strength to goals stated above to improve use of B UE for functional activities.   Progressing not MET (10/28/2020)  5. Patient to demo increase L shoulder AROM to goals stated above to improve ability to use L UE for ADLs.   MET (10/28/2020)  6. Patient to perform work and recreational activities including tumbling, stunting, and front and lateral weighted raises without increased symptoms. progressing not MET (10/28/2020)        Plan     Updated Certification Period: 10/28/2020 to 12/25/2020  Recommended Treatment Plan: 1 times per week for 8 weeks: Electrical Stimulation PRN, Manual Therapy, Moist Heat/ Ice, Neuromuscular Re-ed, Patient Education, Self Care, Therapeutic Activites, Therapeutic Exercise and FDN/PRN    Monitor response to today's treatment and progress with PT POC as indicated and  tolerated.    Ricarda Hotstream, PT

## 2020-12-07 ENCOUNTER — CLINICAL SUPPORT (OUTPATIENT)
Dept: REHABILITATION | Facility: HOSPITAL | Age: 25
End: 2020-12-07
Payer: COMMERCIAL

## 2020-12-07 DIAGNOSIS — R53.1 DECREASED STRENGTH: ICD-10-CM

## 2020-12-07 DIAGNOSIS — M25.512 CHRONIC LEFT SHOULDER PAIN: ICD-10-CM

## 2020-12-07 DIAGNOSIS — G89.29 CHRONIC LEFT SHOULDER PAIN: ICD-10-CM

## 2020-12-07 PROCEDURE — 97140 MANUAL THERAPY 1/> REGIONS: CPT

## 2020-12-07 PROCEDURE — 97110 THERAPEUTIC EXERCISES: CPT

## 2020-12-07 NOTE — PROGRESS NOTES
"    Physical Therapy Treatment Note     Name: Swati Durán  Clinic Number: [id number]    Therapy Diagnosis:   Encounter Diagnoses   Name Primary?    Decreased strength     Chronic left shoulder pain        Physician: Vianey Zamora MD    Visit Date: 12/7/2020    Physician Orders: PT Eval and Treat  Medical Diagnosis from Referral: chronic left shoulder pain  Evaluation Date: 7/28/2020  Authorization Period Expiration: 07/19/2021  Plan of Care Expiration: 12/25/2020  Visit # / Visits authorized: 16/20 (total visits 17) (Insurance 50 then authorization)     PROGRESS NOTE: 11/28/2020    Time In: 11:00 am  Time Out: 11:48 am  Total Billable Time:  45 minutes    Precautions: Standard and hypermobility    Subjective     Pt reports: started having pain in her left shoulder on Friday.  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  Better after manual, but increased difficulty with all therex today.     She was compliant with home exercise program.  Response to previous treatment: None  Functional change: None    Pain: 3/10 today, more pain L shoulder does not feel "right".  Location: right shoulder, anterior and posterior aspects and within the joint    Objective     Measurements:  Decreased AROM and PROM today.        Swati received therapeutic exercises to develop strength, endurance and core stabilization for 30 minutes including:    S/L sh ER 2#, 8x/2  Prone LT isometrics with lats 2/10x, 1# on mat  Prone scap rows at 90 degrees ABD 8/2x, standing  2#, standing  Prone horizontal ABD (on mat)  2/10x  ina V B, addition of abd,  2/8x green theraband  Tabletop on MAT with OH reach 5x/3, 4# MB - HOLD TODAY  Straddle position with legs, push into ground with hands and come up on toes hold 5s, 10x (pre-crow) - HOLD TODAY  Mat plank 10s hold/5x  Quadruped, bird dog 5x/2  Prone W's 8x/2, on swiss ball -  HOLD TODAY  Mat push ups 8x/2-  HOLD TODAY  5# KB to 90 press up and hold 5s, 5x/ea -  HOLD " TODAY  plyo push off mat plank/push up position 10x -  HOLD TODAY  Subscapularis isolation theraband red 15x/2ea B      Plan as tolerated: add KB to theraband, UE push back on theraball, BOSU planks, Y's at wall with theraband, handstand on wall        Defer:  spidermans small circles CCW/CW green theraband, middle circles, high circles 5x/ea B,   Quadruped with leg slide and lower trap activation 5x/ea  Quadruped UE diagnol reach 5x/ea   Plank on mat 10s x3, extended arms  Quadruped ball push with UE reach 5x/each arm (unweighting only)  ABC's  1x/ea 5# KB bottoms up, bilateral  Serratus punch, 10x 5# KB bottoms up, bilateral  Plank on elbows/knees 5s x3    Extended arms plank  1x 5s  Quadruped with core activation, 5s hold 2 reps of hold then rest, 4 sets    Swati received the following manual therapy techniques: Myofacial release, Soft tissue Mobilization and manual releases were applied to the: lateral/medial scapular musculature and lats/infraspinatus, upper trapezial/levator scapular musculature for 15 minutes pre-treatment/post treatment.      Home Exercises Provided and Patient Education Provided     Education provided:   - HEP, scapular stability    Written Home Exercises Provided: Patient instructed to cont prior HEP.  Exercises were reviewed and Swati was able to demonstrate them prior to the end of the session.  Swati demonstrated good  understanding of the education provided.     See EMR under Patient Instructions for exercises provided prior visit.    Assessment     Increased fatigue and decreased speed with all therex today unable to tolerate progressions or normal therex today.  She was more limited with her AROM/strength and neuromuscular control during therex.  She did tolerate all therex well but with decreased range.    Swati is progressing well towards her goals.   Pt prognosis is Good.     Pt will continue to benefit from skilled outpatient physical therapy to address the deficits  listed in the problem list box on initial evaluation, provide pt/family education and to maximize pt's level of independence in the home and community environment.     Pt's spiritual, cultural and educational needs considered and pt agreeable to plan of care and goals.     Anticipated barriers to physical therapy: history of repetitive dislocations    Goals:  Short Term Goals: In 4 weeks   1. Patient to be educated on HEP. MET (9/4/2020)  2. Patient to improve score on the FOTO to progress toward goal of returning to functional and recreational activities.  Not MET (9/4/2020)  3. Patient to have pain less than 4/10 at worst in order to improve QOL.  MET (9/4/2020)  4. Patient to improve strength by 1/2 MMT grade in L UE without pain to improve use of L UE for functional activities.  MET (9/4/2020)  5. Patient will demonstrate improved scapular mobility to improve shoulder mechanics and decrease pain during ADLs. MET (10/28/2020)  6. Patient to be educated on postural and body mechanics awareness.  MET (9/4/2020)     Long Term Goals: In 8 weeks  1. Patient will demonstrate independence in HEP with minimal cueing in order to ensure maintenance of gains.  MET (9/4/2020)  2. Patient to improve score on FOTO to 10/100 or less to achieve goal of returning to functional and recreational activities. progressing not MET (10/28/2020)  3. Patient to have decreased pain to 1/10 at worst in order to improve QOL.  Not MET (10/28/2020)  4. Patient to improve B UE strength to goals stated above to improve use of B UE for functional activities.   Progressing not MET (10/28/2020)  5. Patient to demo increase L shoulder AROM to goals stated above to improve ability to use L UE for ADLs.   MET (10/28/2020)  6. Patient to perform work and recreational activities including tumbling, stunting, and front and lateral weighted raises without increased symptoms. progressing not MET (10/28/2020)        Plan     Updated Certification Period:  10/28/2020 to 12/25/2020  Recommended Treatment Plan: 1 times per week for 8 weeks: Electrical Stimulation PRN, Manual Therapy, Moist Heat/ Ice, Neuromuscular Re-ed, Patient Education, Self Care, Therapeutic Activites, Therapeutic Exercise and FDN/PRN    Monitor response to today's treatment and progress with PT POC as indicated and tolerated.    Ricarda Quintero, PT

## 2020-12-14 ENCOUNTER — CLINICAL SUPPORT (OUTPATIENT)
Dept: REHABILITATION | Facility: HOSPITAL | Age: 25
End: 2020-12-14
Payer: COMMERCIAL

## 2020-12-14 DIAGNOSIS — R53.1 DECREASED STRENGTH: ICD-10-CM

## 2020-12-14 DIAGNOSIS — G89.29 CHRONIC LEFT SHOULDER PAIN: ICD-10-CM

## 2020-12-14 DIAGNOSIS — M25.512 CHRONIC LEFT SHOULDER PAIN: ICD-10-CM

## 2020-12-14 PROCEDURE — 97110 THERAPEUTIC EXERCISES: CPT

## 2020-12-14 PROCEDURE — 97140 MANUAL THERAPY 1/> REGIONS: CPT

## 2020-12-14 NOTE — PLAN OF CARE
Outpatient Therapy Updated Plan of Care     Visit Date: 12/14/2020  Name: Swati Durán  Clinic Number: [id number]    Therapy Diagnosis:   Encounter Diagnoses   Name Primary?   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 Is not able to  anything heavy. Limited with tumbling.    Subjective     Update: Pt reports no pain after flare up last week.  Was able to do handstand, handstand hops, push ups with guest tumbling  that she worked with.  But has not been as consistent with her HEP.    Objective     Update:   easurements:  Shoulder AROM Right  7/28/2020 Left  7/28/2020 Right  10/2/2020 Left  10/2/2020 Bilateral   10/28/2020   Shoulder Flexion (180) Full 170 160 170 WNL   Shoulder Extension (60) 50 55 60 60 WNL   Shoulder Abduction (180) 162 110 p! 165 170 WNL   Shoulder ER (90) R = L, WFL -- T3 T3 WNL   Shoulder IR (70) R = L, WFL -- Mid thoracic spine Mid thoracic spine WNL        Shoulder AROM Goal   Shoulder Flexion (180) 180   Shoulder Extension (60) 60   Shoulder Abduction (180) 165 B   Shoulder ER (90) N/A   Shoulder IR (70) N/A      STRENGTH:     U/E MMT Right  7/28/2020 Left  7/28/2020 Right   10/2/2020 Left  10/2/2020 Right  10/28/2020 Left  10/28/2020 Right  12/14/2020 Left  12/14/2020   Cervical SB 4-/5 4-/5 4/5 4/5 4+ 4+ NT NT   Shoulder Elevation 4+/5 4+/5 NT NT NT NT NT NT   Shoulder Flexion 4/5 4/5 4+/5 (arm at side) 4+/5 (arm at side) NT NT 4/5 4-/5   Shoulder Extension 4+/5 4/5 4+/5 (arm at side) 4+/5 (arm  at side) NT NT 4/5 4-/5   Shoulder Abduction 4+/5 4/5 4+/5 4+/5 4+/5 4+/5 4/5 4-/5   Shoulder IR 4/5 4-/5 4+/5 (arm at side) 4+/5 (arm at side) 4/5-4+/5 4/5-4+/5     Shoulder ER 4/5 4-/5 4+/5 (arm at side) 4+/5 (arm at side) 4/5-4+/5 4/5-4+/5 4+/5 4/5   Elbow Flexion  4+/5 4/5 4+/5  4+/5 4+/5 4+/5 4+/5 4+/5   Elbow Extension 4+/5 4/5 NT NT 4+/5 4+/5 4+/5 4+/5   serrtaus anterior NT NT 4/5 4/5 4/5 4-/5 4+/5 4-/5   supraspinatus NT NT 4/5 4/5 4/5 4/5 4+/5 4-/5     FOTO:      Assessment     Update: Pt has recovered from a flare up last visit with L shoulder.  She has decreased strength on L side most likely due to recent flare up but much improved from last visit when patient had difficulty tolerating therex.   She is rating herself as improved since evaluation on 7/28/2020, and is progressing with strength training and HEP.  She remains limited in tumbling/cheer activities but has been able to tolerate handstands, and handstand activity over the weekend per her report.    Reviewed 12/14/2020  Short Term Goals: In 4 weeks   1. Patient to be educated on HEP. MET (9/4/2020)  2. Patient to improve score on the FOTO to progress toward goal of returning to functional and recreational activities.  Not MET (9/4/2020)  3. Patient to have pain less than 4/10 at worst in order to improve QOL.  MET (9/4/2020)  4. Patient to improve strength by 1/2 MMT grade in L UE without pain to improve use of L UE for functional activities.  MET (9/4/2020)  5. Patient will demonstrate improved scapular mobility to improve shoulder mechanics and decrease pain during ADLs. MET (10/28/2020)  6. Patient to be educated on postural and body mechanics awareness.  MET (9/4/2020)     Long Term Goals: In 8 weeks  1. Patient will demonstrate independence in HEP with minimal cueing in order to ensure maintenance of gains.  MET (9/4/2020)  2. Patient to improve score on FOTO to 10/100 or less to achieve goal of returning to functional and recreational  activities. progressing not MET (12/14/2020)  3. Patient to have decreased pain to 1/10 at worst in order to improve QOL.  MET (12/14/2020)  4. Patient to improve B UE strength to goals stated above to improve use of B UE for functional activities.   Progressing not MET (12/14/2020)  5. Patient to demo increase L shoulder AROM to goals stated above to improve ability to use L UE for ADLs.   MET (10/28/2020)  6. Patient to perform work and recreational activities including tumbling, stunting, and front and lateral weighted raises without increased symptoms. progressing not MET (12/14/2020)         Previous Short Term Goals Status:   See ablve  New Short Term Goals Status:   See above  Long Term Goal Status:   continue per initial plan of care.  Reasons for Recertification of Therapy:   Continue to progress towards all goals, progress slower than anticipated due to hypermobility B UE.    Plan     Updated Certification Period: 12/14/2020 to 2/8/2020  Recommended Treatment Plan: 1 times per week for 8 weeks: Electrical Stimulation PRN, Manual Therapy, Moist Heat/ Ice, Neuromuscular Re-ed, Patient Education, Self Care, Therapeutic Activites, Therapeutic Exercise and FDN/PRN  Other Recommendations: None    Ricarda Quintero, PT  12/14/2020      I CERTIFY THE NEED FOR THESE SERVICES FURNISHED UNDER THIS PLAN OF TREATMENT AND WHILE UNDER MY CARE    Physician's comments:        Physician's Signature: ___________________________________________________

## 2020-12-14 NOTE — PROGRESS NOTES
Physical Therapy Treatment Note     Name: Swati Durán  Clinic Number: [id number]    Therapy Diagnosis:   Encounter Diagnoses   Name Primary?    Decreased strength     Chronic left shoulder pain        Physician: Vianey Zamora MD    Visit Date: 12/14/2020    Physician Orders: PT Eval and Treat  Medical Diagnosis from Referral: chronic left shoulder pain  Evaluation Date: 7/28/2020  Authorization Period Expiration: 07/19/2021  Plan of Care Expiration: 2/8/2021  Visit # / Visits authorized: 17/20 (total visits 18) (Insurance 50 then authorization)     PROGRESS NOTE: 1/14/2020    Time In: 11:10 am  Time Out: 11:48 am  Total Billable Time:  45 minutes    Precautions: Standard and hypermobility    Subjective     Pt reports:  Pt reports no pain after flare up last week.  Was able to do handstand, handstand hops, push ups with guest tumbling  that she worked with.  But has not been as consistent with her HEP.     She was compliant with home exercise program.  Response to previous treatment: None  Functional change: None    Pain: 0/10 today  Location: right shoulder, anterior and posterior aspects and within the joint    Objective        Sawti received therapeutic exercises to develop strength, endurance and core stabilization for 30 minutes including:    S/L sh ER 3#, 8x/3  Prone LT isometrics with lats 2/10x, 1# on ball  Prone scap rows at 90 degrees ABD 8/2x, standing  2#, on ball  Prone horizontal ABD (on mat)  2/10x on ball  ina V B, addition of abd,  2/8x green theraband  Tabletop on MAT with OH reach 8x/2, 4# MB   Straddle position with legs, push into ground with hands and come up on toes hold 5s, 10x (pre-crow)   Quadruped, bird dog 10x  Prone W's 8x/2, on swiss ball -  HOLD TODAY  Mat push ups 8x/2  5# KB to 90 press up and hold 5s, 5x/ea -  HOLD TODAY  plyo push off mat plank/push up position 10x   Subscapularis isolation theraband green 15x/2ea B  Swiss ball walk outs, 3x      Plan as  tolerated: add KB to theraband, UE push back on theraball, BOSU planks, Y's at wall with theraband, handstand on wall        Defer:  spidermans small circles CCW/CW green theraband, middle circles, high circles 5x/ea B,   Quadruped with leg slide and lower trap activation 5x/ea  Quadruped UE diagnol reach 5x/ea   Plank on mat 10s x3, extended arms  Quadruped ball push with UE reach 5x/each arm (unweighting only)  ABC's  1x/ea 5# KB bottoms up, bilateral  Serratus punch, 10x 5# KB bottoms up, bilateral  Plank on elbows/knees 5s x3    Extended arms plank  1x 5s  Quadruped with core activation, 5s hold 2 reps of hold then rest, 4 sets  Mat plank 10s hold/5x    Swati received the following manual therapy techniques: Myofacial release, Soft tissue Mobilization and manual releases were applied to the: lateral/medial scapular musculature and lats/infraspinatus, upper trapezial/levator scapular musculature for 15 minutes pre-treatment/post treatment.      Home Exercises Provided and Patient Education Provided     Education provided:   - HEP, scapular stability    Written Home Exercises Provided: Patient instructed to cont prior HEP.  Exercises were reviewed and Swati was able to demonstrate them prior to the end of the session.  Swati demonstrated good  understanding of the education provided.     See EMR under Patient Instructions for exercises provided prior visit.    Assessment     See POC update.    Swati is progressing well towards her goals.   Pt prognosis is Good.     Pt will continue to benefit from skilled outpatient physical therapy to address the deficits listed in the problem list box on initial evaluation, provide pt/family education and to maximize pt's level of independence in the home and community environment.     Pt's spiritual, cultural and educational needs considered and pt agreeable to plan of care and goals.     Anticipated barriers to physical therapy: history of repetitive  dislocations    Goals:  Reviewed 12/14/2020  Short Term Goals: In 4 weeks   1. Patient to be educated on HEP. MET (9/4/2020)  2. Patient to improve score on the FOTO to progress toward goal of returning to functional and recreational activities.  Not MET (9/4/2020)  3. Patient to have pain less than 4/10 at worst in order to improve QOL.  MET (9/4/2020)  4. Patient to improve strength by 1/2 MMT grade in L UE without pain to improve use of L UE for functional activities.  MET (9/4/2020)  5. Patient will demonstrate improved scapular mobility to improve shoulder mechanics and decrease pain during ADLs. MET (10/28/2020)  6. Patient to be educated on postural and body mechanics awareness.  MET (9/4/2020)     Long Term Goals: In 8 weeks  1. Patient will demonstrate independence in HEP with minimal cueing in order to ensure maintenance of gains.  MET (9/4/2020)  2. Patient to improve score on FOTO to 10/100 or less to achieve goal of returning to functional and recreational activities. progressing not MET (12/14/2020)  3. Patient to have decreased pain to 1/10 at worst in order to improve QOL.  MET (12/14/2020)  4. Patient to improve B UE strength to goals stated above to improve use of B UE for functional activities.   Progressing not MET (12/14/2020)  5. Patient to demo increase L shoulder AROM to goals stated above to improve ability to use L UE for ADLs.   MET (10/28/2020)  6. Patient to perform work and recreational activities including tumbling, stunting, and front and lateral weighted raises without increased symptoms. progressing not MET (12/14/2020)       Plan     Updated Certification Period: 12/14/2020 to 2/8/2020  Recommended Treatment Plan: 1 times per week for 8 weeks: Electrical Stimulation PRN, Manual Therapy, Moist Heat/ Ice, Neuromuscular Re-ed, Patient Education, Self Care, Therapeutic Activites, Therapeutic Exercise and FDN/PRN    Monitor response to today's treatment and progress with PT POC as  indicated and tolerated.    Ricarda Rebecatream, PT

## 2020-12-21 ENCOUNTER — CLINICAL SUPPORT (OUTPATIENT)
Dept: REHABILITATION | Facility: HOSPITAL | Age: 25
End: 2020-12-21
Payer: COMMERCIAL

## 2020-12-21 DIAGNOSIS — M25.512 CHRONIC LEFT SHOULDER PAIN: ICD-10-CM

## 2020-12-21 DIAGNOSIS — R53.1 DECREASED STRENGTH: ICD-10-CM

## 2020-12-21 DIAGNOSIS — G89.29 CHRONIC LEFT SHOULDER PAIN: ICD-10-CM

## 2020-12-21 PROCEDURE — 97110 THERAPEUTIC EXERCISES: CPT

## 2020-12-21 PROCEDURE — 97140 MANUAL THERAPY 1/> REGIONS: CPT

## 2020-12-21 NOTE — PROGRESS NOTES
Physical Therapy Treatment Note     Name: Swati Durán  Clinic Number: [id number]    Therapy Diagnosis:   Encounter Diagnoses   Name Primary?    Decreased strength     Chronic left shoulder pain        Physician: Vianey Zamora MD    Visit Date: 12/21/2020    Physician Orders: PT Eval and Treat  Medical Diagnosis from Referral: chronic left shoulder pain  Evaluation Date: 7/28/2020  Authorization Period Expiration: 07/19/2021  Plan of Care Expiration: 2/8/2021  Visit # / Visits authorized: 18/20 (total visits 19) (Insurance 50 then authorization)     PROGRESS NOTE: 1/14/2020    Time In: 11:02 am  Time Out: 11:43 am  Total Billable Time: 40 minutes    Precautions: Standard and hypermobility    Subjective     Pt reports:  Pt reports no pain, did do her exercises, used 5# and she was super sore.     She was compliant with home exercise program.  Response to previous treatment: None  Functional change: None    Pain: 0/10 today  Location: right shoulder, anterior and posterior aspects and within the joint    Objective        Swati received therapeutic exercises to develop strength, endurance and core stabilization for 32 minutes including:    S/L sh ER 4#, 6x/3 ea  S/L flexion 3# 8x/ea  Prone LT isometrics with lats 2/10x, 1# on ball  Prone scap rows at 90 degrees ABD 8/2x, 2#, on ball  Tabletop on MAT with OH reach 10x/2, 4# MB   Straddle position with legs, push into ground with hands and come up on toes hold 5s, 10x (pre-crow)   Quadruped, bird dog 10x  Prone W's 8x/2, on swiss ball   Mat push ups 10x/2  5# KB to 90 press up and hold 5s, 5x/ea -  HOLD TODAY  plyo push off mat plank/push up position 5x   Subscapularis isolation theraband green 10x/2ea B  Small range IR with green theraband 2/10x B  Swiss ball walk outs, 3x      Plan as tolerated: add KB to theraband, UE push back on theraball, BOSU planks, Y's at wall with theraband, handstand on wall        Defer:  ina MOREIRA B, addition of abd,  2/8x  green theraband  Prone horizontal ABD  2/10x on ball  spidermans small circles CCW/CW green theraband, middle circles, high circles 5x/ea B,   Quadruped with leg slide and lower trap activation 5x/ea  Quadruped UE diagnol reach 5x/ea   Plank on mat 10s x3, extended arms  Quadruped ball push with UE reach 5x/each arm (unweighting only)  ABC's  1x/ea 5# KB bottoms up, bilateral  Serratus punch, 10x 5# KB bottoms up, bilateral  Plank on elbows/knees 5s x3    Extended arms plank  1x 5s  Quadruped with core activation, 5s hold 2 reps of hold then rest, 4 sets  Mat plank 10s hold/5x    Swati received the following manual therapy techniques: Myofacial release, Soft tissue Mobilization and manual releases were applied to the: lateral/medial scapular musculature and lats/infraspinatus, upper trapezial/levator scapular musculature for 8 minutes pre-treatment/post treatment.      Home Exercises Provided and Patient Education Provided     Education provided:   - HEP, scapular stability    Written Home Exercises Provided: Patient instructed to cont prior HEP.  Exercises were reviewed and Swati was able to demonstrate them prior to the end of the session.  Swati demonstrated good  understanding of the education provided.     See EMR under Patient Instructions for exercises provided prior visit.    Assessment     Pt continues to be challenged by all therex and activities.  Discussed importance of HEP to continue to improve strength and motor control.  Pt requires cues to avoid substitution with therex.  Issued green band for HEP.      Swati is progressing well towards her goals.   Pt prognosis is Good.     Pt will continue to benefit from skilled outpatient physical therapy to address the deficits listed in the problem list box on initial evaluation, provide pt/family education and to maximize pt's level of independence in the home and community environment.     Pt's spiritual, cultural and educational needs considered and  pt agreeable to plan of care and goals.     Anticipated barriers to physical therapy: history of repetitive dislocations    Goals:  Reviewed 12/14/2020  Short Term Goals: In 4 weeks   1. Patient to be educated on HEP. MET (9/4/2020)  2. Patient to improve score on the FOTO to progress toward goal of returning to functional and recreational activities.  Not MET (9/4/2020)  3. Patient to have pain less than 4/10 at worst in order to improve QOL.  MET (9/4/2020)  4. Patient to improve strength by 1/2 MMT grade in L UE without pain to improve use of L UE for functional activities.  MET (9/4/2020)  5. Patient will demonstrate improved scapular mobility to improve shoulder mechanics and decrease pain during ADLs. MET (10/28/2020)  6. Patient to be educated on postural and body mechanics awareness.  MET (9/4/2020)     Long Term Goals: In 8 weeks  1. Patient will demonstrate independence in HEP with minimal cueing in order to ensure maintenance of gains.  MET (9/4/2020)  2. Patient to improve score on FOTO to 10/100 or less to achieve goal of returning to functional and recreational activities. progressing not MET (12/14/2020)  3. Patient to have decreased pain to 1/10 at worst in order to improve QOL.  MET (12/14/2020)  4. Patient to improve B UE strength to goals stated above to improve use of B UE for functional activities.   Progressing not MET (12/14/2020)  5. Patient to demo increase L shoulder AROM to goals stated above to improve ability to use L UE for ADLs.   MET (10/28/2020)  6. Patient to perform work and recreational activities including tumbling, stunting, and front and lateral weighted raises without increased symptoms. progressing not MET (12/14/2020)       Plan     Updated Certification Period: 12/14/2020 to 2/8/2020  Recommended Treatment Plan: 1 times per week for 8 weeks: Electrical Stimulation PRN, Manual Therapy, Moist Heat/ Ice, Neuromuscular Re-ed, Patient Education, Self Care, Therapeutic Activites,  Therapeutic Exercise and FDN/PRN    Monitor response to today's treatment and progress with PT POC as indicated and tolerated.    Ricarda Quintero, PT

## 2020-12-30 ENCOUNTER — PATIENT MESSAGE (OUTPATIENT)
Dept: REHABILITATION | Facility: HOSPITAL | Age: 25
End: 2020-12-30

## 2021-01-08 ENCOUNTER — CLINICAL SUPPORT (OUTPATIENT)
Dept: REHABILITATION | Facility: HOSPITAL | Age: 26
End: 2021-01-08
Payer: COMMERCIAL

## 2021-01-08 DIAGNOSIS — G89.29 CHRONIC LEFT SHOULDER PAIN: ICD-10-CM

## 2021-01-08 DIAGNOSIS — R53.1 DECREASED STRENGTH: ICD-10-CM

## 2021-01-08 DIAGNOSIS — M25.512 CHRONIC LEFT SHOULDER PAIN: ICD-10-CM

## 2021-01-08 PROCEDURE — 97110 THERAPEUTIC EXERCISES: CPT

## 2021-01-08 PROCEDURE — 97140 MANUAL THERAPY 1/> REGIONS: CPT

## 2021-01-10 DIAGNOSIS — B96.89 BV (BACTERIAL VAGINOSIS): Primary | ICD-10-CM

## 2021-01-10 DIAGNOSIS — N76.0 BV (BACTERIAL VAGINOSIS): Primary | ICD-10-CM

## 2021-01-10 RX ORDER — METRONIDAZOLE 500 MG/1
500 TABLET ORAL EVERY 12 HOURS
Qty: 14 TABLET | Refills: 0 | Status: SHIPPED | OUTPATIENT
Start: 2021-01-10 | End: 2021-01-17

## 2021-01-21 ENCOUNTER — OFFICE VISIT (OUTPATIENT)
Dept: ORTHOPEDICS | Facility: CLINIC | Age: 26
End: 2021-01-21
Payer: COMMERCIAL

## 2021-01-21 ENCOUNTER — HOSPITAL ENCOUNTER (OUTPATIENT)
Dept: RADIOLOGY | Facility: HOSPITAL | Age: 26
Discharge: HOME | End: 2021-01-21
Attending: FAMILY MEDICINE
Payer: COMMERCIAL

## 2021-01-21 VITALS
BODY MASS INDEX: 26.05 KG/M2 | HEIGHT: 63 IN | WEIGHT: 147 LBS | SYSTOLIC BLOOD PRESSURE: 122 MMHG | DIASTOLIC BLOOD PRESSURE: 78 MMHG | HEART RATE: 83 BPM

## 2021-01-21 DIAGNOSIS — M25.572 LEFT ANKLE PAIN, UNSPECIFIED CHRONICITY: ICD-10-CM

## 2021-01-21 DIAGNOSIS — S83.92XA SPRAIN OF LEFT KNEE, UNSPECIFIED LIGAMENT, INITIAL ENCOUNTER: Primary | ICD-10-CM

## 2021-01-21 DIAGNOSIS — M25.572 LEFT ANKLE PAIN, UNSPECIFIED CHRONICITY: Primary | ICD-10-CM

## 2021-01-21 DIAGNOSIS — S93.402A MODERATE LEFT ANKLE SPRAIN, INITIAL ENCOUNTER: Primary | ICD-10-CM

## 2021-01-21 PROCEDURE — 99214 PR OFFICE/OUTPT VISIT, EST, LEVL IV, 30-39 MIN: ICD-10-PCS | Mod: S$GLB,,, | Performed by: FAMILY MEDICINE

## 2021-01-21 PROCEDURE — 99999 PR PBB SHADOW E&M-EST. PATIENT-LVL III: CPT | Mod: PBBFAC,,, | Performed by: FAMILY MEDICINE

## 2021-01-21 PROCEDURE — 73610 X-RAY EXAM OF ANKLE: CPT | Mod: 26,LT,, | Performed by: RADIOLOGY

## 2021-01-21 PROCEDURE — 1125F PR PAIN SEVERITY QUANTIFIED, PAIN PRESENT: ICD-10-PCS | Mod: S$GLB,,, | Performed by: FAMILY MEDICINE

## 2021-01-21 PROCEDURE — 99214 OFFICE O/P EST MOD 30 MIN: CPT | Mod: S$GLB,,, | Performed by: FAMILY MEDICINE

## 2021-01-21 PROCEDURE — 3008F BODY MASS INDEX DOCD: CPT | Mod: CPTII,S$GLB,, | Performed by: FAMILY MEDICINE

## 2021-01-21 PROCEDURE — 73610 XR ANKLE COMPLETE 3 VIEW LEFT: ICD-10-PCS | Mod: 26,LT,, | Performed by: RADIOLOGY

## 2021-01-21 PROCEDURE — 73610 X-RAY EXAM OF ANKLE: CPT | Mod: TC,LT

## 2021-01-21 PROCEDURE — 1125F AMNT PAIN NOTED PAIN PRSNT: CPT | Mod: S$GLB,,, | Performed by: FAMILY MEDICINE

## 2021-01-21 PROCEDURE — 99999 PR PBB SHADOW E&M-EST. PATIENT-LVL III: ICD-10-PCS | Mod: PBBFAC,,, | Performed by: FAMILY MEDICINE

## 2021-01-21 PROCEDURE — 3008F PR BODY MASS INDEX (BMI) DOCUMENTED: ICD-10-PCS | Mod: CPTII,S$GLB,, | Performed by: FAMILY MEDICINE

## 2021-01-22 PROBLEM — S93.402A MODERATE LEFT ANKLE SPRAIN: Status: ACTIVE | Noted: 2021-01-22

## 2021-10-14 ENCOUNTER — OFFICE VISIT (OUTPATIENT)
Dept: FAMILY MEDICINE | Facility: CLINIC | Age: 26
End: 2021-10-14
Payer: COMMERCIAL

## 2021-10-14 VITALS
HEART RATE: 85 BPM | OXYGEN SATURATION: 99 % | HEIGHT: 63 IN | TEMPERATURE: 98 F | WEIGHT: 149.06 LBS | BODY MASS INDEX: 26.41 KG/M2 | SYSTOLIC BLOOD PRESSURE: 114 MMHG | DIASTOLIC BLOOD PRESSURE: 68 MMHG

## 2021-10-14 DIAGNOSIS — Z00.00 ANNUAL PHYSICAL EXAM: Primary | ICD-10-CM

## 2021-10-14 DIAGNOSIS — E04.9 GOITER: ICD-10-CM

## 2021-10-14 PROCEDURE — 99999 PR PBB SHADOW E&M-EST. PATIENT-LVL IV: ICD-10-PCS | Mod: PBBFAC,,, | Performed by: FAMILY MEDICINE

## 2021-10-14 PROCEDURE — 88175 CYTOPATH C/V AUTO FLUID REDO: CPT | Performed by: FAMILY MEDICINE

## 2021-10-14 PROCEDURE — 99999 PR PBB SHADOW E&M-EST. PATIENT-LVL IV: CPT | Mod: PBBFAC,,, | Performed by: FAMILY MEDICINE

## 2021-10-14 PROCEDURE — 99395 PR PREVENTIVE VISIT,EST,18-39: ICD-10-PCS | Mod: S$GLB,,, | Performed by: FAMILY MEDICINE

## 2021-10-14 PROCEDURE — 87624 HPV HI-RISK TYP POOLED RSLT: CPT | Performed by: FAMILY MEDICINE

## 2021-10-14 PROCEDURE — 99395 PREV VISIT EST AGE 18-39: CPT | Mod: S$GLB,,, | Performed by: FAMILY MEDICINE

## 2021-12-27 ENCOUNTER — PATIENT OUTREACH (OUTPATIENT)
Dept: ADMINISTRATIVE | Facility: OTHER | Age: 26
End: 2021-12-27
Payer: COMMERCIAL

## 2023-01-04 ENCOUNTER — PATIENT MESSAGE (OUTPATIENT)
Dept: ORTHOPEDICS | Facility: CLINIC | Age: 28
End: 2023-01-04
Payer: COMMERCIAL

## 2023-01-04 ENCOUNTER — TELEPHONE (OUTPATIENT)
Dept: ORTHOPEDICS | Facility: CLINIC | Age: 28
End: 2023-01-04
Payer: COMMERCIAL

## 2023-09-24 ENCOUNTER — HOSPITAL ENCOUNTER (EMERGENCY)
Facility: HOSPITAL | Age: 28
Discharge: HOME | End: 2023-09-24
Attending: EMERGENCY MEDICINE
Payer: COMMERCIAL

## 2023-09-24 VITALS
WEIGHT: 154.13 LBS | DIASTOLIC BLOOD PRESSURE: 77 MMHG | HEART RATE: 71 BPM | RESPIRATION RATE: 18 BRPM | OXYGEN SATURATION: 98 % | BODY MASS INDEX: 27.3 KG/M2 | TEMPERATURE: 98 F | SYSTOLIC BLOOD PRESSURE: 138 MMHG

## 2023-09-24 DIAGNOSIS — S93.401A SPRAIN OF RIGHT ANKLE, UNSPECIFIED LIGAMENT, INITIAL ENCOUNTER: Primary | ICD-10-CM

## 2023-09-24 DIAGNOSIS — S99.911A RIGHT ANKLE INJURY, INITIAL ENCOUNTER: ICD-10-CM

## 2023-09-24 PROCEDURE — 99283 EMERGENCY DEPT VISIT LOW MDM: CPT

## 2023-09-24 RX ORDER — IBUPROFEN 600 MG/1
600 TABLET ORAL EVERY 6 HOURS PRN
Qty: 20 TABLET | Refills: 0 | Status: SHIPPED | OUTPATIENT
Start: 2023-09-24 | End: 2023-12-29

## 2023-09-25 NOTE — ED PROVIDER NOTES
"Encounter Date: 9/24/2023       History     Chief Complaint   Patient presents with    Ankle Pain     Pt reports injuring her right ankle pain on Monday at [facility]. Pt st she heard a "snap". Swelling noted to right ankle.      27-year-old female presents emergency department with complaints of right ankle pain.  Patient reports twisting her ankle while walking at [facility] on Monday.  Patient denies any other injuries or symptoms at this time.    The history is provided by the patient.     Review of patient's allergies indicates:   Allergen Reactions    Food allergy formula  [glutamine-c-quercet-selen-brom] Hives     Past Medical History:   Diagnosis Date    Goiter 10/14/2020     Past Surgical History:   Procedure Laterality Date    None       Family History   Problem Relation Age of Onset    Hypertension Mother     No Known Problems Father     Breast cancer Maternal Grandmother     Other Maternal Grandmother         borderline diabetes    Coronary artery disease Maternal Grandmother     Lung cancer Paternal Grandfather     No Known Problems Sister     No Known Problems Brother     Stroke Neg Hx      Social History     Tobacco Use    Smoking status: Never    Smokeless tobacco: Never   Substance Use Topics    Alcohol use: Yes     Alcohol/week: 0.0 standard drinks of alcohol     Comment: Occasionally    Drug use: No     Review of Systems   Constitutional:  Negative for fever.   HENT:  Negative for sore throat.    Respiratory:  Negative for shortness of breath.    Cardiovascular:  Negative for chest pain.   Gastrointestinal:  Negative for nausea.   Genitourinary:  Negative for dysuria.   Musculoskeletal:  Positive for arthralgias and joint swelling. Negative for back pain.   Skin:  Negative for rash.   Neurological:  Negative for weakness.   Hematological:  Does not bruise/bleed easily.   All other systems reviewed and are negative.      Physical Exam     Initial Vitals [09/24/23 2013]   BP Pulse Resp Temp " SpO2   138/77 71 18 98.1 °F (36.7 °C) 98 %      MAP       --         Physical Exam    Constitutional: She appears well-developed and well-nourished. She is not diaphoretic. No distress.   HENT:   Head: Normocephalic and atraumatic.   Eyes: Conjunctivae and EOM are normal. Pupils are equal, round, and reactive to light.   Neck: Neck supple.   Normal range of motion.  Cardiovascular:  Normal rate, regular rhythm and normal heart sounds.           No murmur heard.  Pulmonary/Chest: Breath sounds normal. No respiratory distress. She has no wheezes. She has no rales.   Abdominal: Abdomen is soft. Bowel sounds are normal. There is no abdominal tenderness. There is no rebound and no guarding.   Musculoskeletal:         General: No edema. Normal range of motion.      Cervical back: Normal range of motion and neck supple.      Right ankle: Swelling present. Tenderness present over the lateral malleolus.     Neurological: She is alert and oriented to person, place, and time. No cranial nerve deficit. GCS score is 15. GCS eye subscore is 4. GCS verbal subscore is 5. GCS motor subscore is 6.   Skin: Skin is warm and dry. Capillary refill takes less than 2 seconds.   Psychiatric: She has a normal mood and affect. Thought content normal.         ED Course   Orthopedic Injury    Date/Time: 9/24/2023 9:27 PM    Performed by: Steve Justin Jr., FNP  Authorized by: Marco Snow Jr., MD    Location procedure was performed:  [facility] EMERGENCY DEPARTMENT  Consent Done?:  Yes  Universal Protocol:     Verbal consent obtained?: Yes      Consent given by:  Patient  Injury:     Injury location:  Ankle    Location details:  Right ankle    Injury type:  Soft tissue      Pre-procedure assessment:     Neurovascular status: Neurovascularly intact      Distal perfusion: normal      Neurological function: normal      Range of motion: normal        Selections made in this section will also lock the Injury type section above.:     Supplies  used:  Elastic bandage    Complications: No    Post-procedure assessment:     Neurovascular status: Neurovascularly intact      Distal perfusion: normal      Neurological function: normal      Range of motion: unchanged      Patient tolerance:  Patient tolerated the procedure well with no immediate complications    Labs Reviewed   HIV 1 / 2 ANTIBODY   HEPATITIS C ANTIBODY   HEP C VIRUS HOLD SPECIMEN          Imaging Results              X-Ray Ankle Complete Right (Final result)  Result time 09/24/23 21:04:47      Final result by Ulises Day MD (09/24/23 21:04:47)                   Impression:      Soft tissue swelling without definite acute osseous abnormality.  Correlation and further evaluation as warranted.      Electronically signed by: Ulises Day  Date:    09/24/2023  Time:    21:04               Narrative:    EXAMINATION:  XR ANKLE COMPLETE 3 VIEW RIGHT    CLINICAL HISTORY:  Unspecified injury of right ankle, initial encounter    TECHNIQUE:  AP, lateral, and oblique images of the right ankle were performed.    COMPARISON:  None    FINDINGS:  No acute displaced fracture.  No traumatic malalignment.  No osseous destructive process.  Soft tissue swelling.                                       Medications - No data to display  Medical Decision Making  Amount and/or Complexity of Data Reviewed  Labs: ordered.  Radiology: ordered.     Details: No acute fracture    Risk  Risk Details: Ace wrap applied, NSAIDs as needed for swelling and pain.                               Clinical Impression:   Final diagnoses:  [S99.911A] Right ankle injury, initial encounter  [S93.401A] Sprain of right ankle, unspecified ligament, initial encounter (Primary)        ED Disposition Condition    Discharge Stable          ED Prescriptions       Medication Sig Dispense Start Date End Date Auth. Provider    ibuprofen (ADVIL,MOTRIN) 600 MG tablet Take 1 tablet (600 mg total) by mouth every 6 (six) hours as needed for Pain. 20  tablet 9/24/2023 -- Steve Justin Jr., FNP          Follow-up Information       Follow up With Specialties Details Why Contact Info    Danika Fitzgerald MD Family Medicine In 1 week  [address] [facility] [id number] [phone]               Steve Justin Jr., OSIELP  09/24/23 5830

## 2023-12-02 ENCOUNTER — HOSPITAL ENCOUNTER (EMERGENCY)
Facility: HOSPITAL | Age: 28
Discharge: HOME | End: 2023-12-02
Attending: EMERGENCY MEDICINE
Payer: COMMERCIAL

## 2023-12-02 VITALS
RESPIRATION RATE: 16 BRPM | BODY MASS INDEX: 27.29 KG/M2 | TEMPERATURE: 99 F | WEIGHT: 154 LBS | HEIGHT: 63 IN | OXYGEN SATURATION: 98 % | SYSTOLIC BLOOD PRESSURE: 111 MMHG | DIASTOLIC BLOOD PRESSURE: 55 MMHG | HEART RATE: 90 BPM

## 2023-12-02 DIAGNOSIS — S93.402A SPRAIN OF LEFT ANKLE, UNSPECIFIED LIGAMENT, INITIAL ENCOUNTER: Primary | ICD-10-CM

## 2023-12-02 DIAGNOSIS — S99.912A LEFT ANKLE INJURY: ICD-10-CM

## 2023-12-02 PROCEDURE — 99284 EMERGENCY DEPT VISIT MOD MDM: CPT

## 2023-12-02 RX ORDER — IBUPROFEN 800 MG/1
800 TABLET ORAL EVERY 6 HOURS PRN
Qty: 20 TABLET | Refills: 0 | Status: SHIPPED | OUTPATIENT
Start: 2023-12-02

## 2023-12-02 RX ORDER — TRAMADOL HYDROCHLORIDE 50 MG/1
50 TABLET ORAL EVERY 6 HOURS PRN
Qty: 12 TABLET | Refills: 0 | Status: SHIPPED | OUTPATIENT
Start: 2023-12-02

## 2023-12-02 NOTE — Clinical Note
"Swati Stanley" Leeanna was seen and treated in our emergency department on 12/2/2023.  She may return to gym class or sports on 12/08/2023.      If you have any questions or concerns, please don't hesitate to call.      Steve Justin Jr., OSIELP"

## 2023-12-02 NOTE — ED PROVIDER NOTES
Encounter Date: 12/2/2023       History     Chief Complaint   Patient presents with    Ankle Pain     Pt. Reports injuring L ankle while landing a tumble pass yesterday. + Swelling to lateral aspect of ankle. Able to ambulate. Has velcro brace in place.     28-year-old female presents emergency department with complaints of left ankle pain.  Symptoms began after doing a flip yesterday at [employer].  Patient denies any other injuries or symptoms.    The history is provided by the patient.     Review of patient's allergies indicates:   Allergen Reactions    Food allergy formula  [glutamine-c-quercet-selen-brom] Hives     Past Medical History:   Diagnosis Date    Goiter 10/14/2020     Past Surgical History:   Procedure Laterality Date    None       Family History   Problem Relation Age of Onset    Hypertension Mother     No Known Problems Father     Breast cancer Maternal Grandmother     Other Maternal Grandmother         borderline diabetes    Coronary artery disease Maternal Grandmother     Lung cancer Paternal Grandfather     No Known Problems Sister     No Known Problems Brother     Stroke Neg Hx      Social History     Tobacco Use    Smoking status: Never    Smokeless tobacco: Never   Substance Use Topics    Alcohol use: Yes     Alcohol/week: 0.0 standard drinks of alcohol     Comment: Occasionally    Drug use: No     Review of Systems   Constitutional:  Negative for fever.   HENT:  Negative for sore throat.    Respiratory:  Negative for shortness of breath.    Cardiovascular:  Negative for chest pain.   Gastrointestinal:  Negative for nausea.   Genitourinary:  Negative for dysuria.   Musculoskeletal:  Positive for arthralgias and joint swelling. Negative for back pain.   Skin:  Negative for rash.   Neurological:  Negative for weakness.   Hematological:  Does not bruise/bleed easily.   All other systems reviewed and are negative.      Physical Exam     Initial Vitals [12/02/23 1401]   BP Pulse Resp Temp  SpO2   (!) 111/55 90 16 98.6 °F (37 °C) 98 %      MAP       --         Physical Exam    Constitutional: She appears well-developed and well-nourished. She is not diaphoretic. No distress.   HENT:   Head: Normocephalic and atraumatic.   Eyes: Conjunctivae and EOM are normal. Pupils are equal, round, and reactive to light.   Neck: Neck supple.   Normal range of motion.  Cardiovascular:  Normal rate, regular rhythm and normal heart sounds.           No murmur heard.  Pulmonary/Chest: Breath sounds normal. No respiratory distress. She has no wheezes. She has no rales.   Abdominal: Abdomen is soft. Bowel sounds are normal. There is no abdominal tenderness. There is no rebound and no guarding.   Musculoskeletal:         General: No edema.      Cervical back: Normal range of motion and neck supple.      Left ankle: Swelling present. No deformity. Tenderness present over the lateral malleolus and medial malleolus. Normal range of motion.     Neurological: She is alert and oriented to person, place, and time. No cranial nerve deficit. GCS score is 15. GCS eye subscore is 4. GCS verbal subscore is 5. GCS motor subscore is 6.   Skin: Skin is warm and dry. Capillary refill takes less than 2 seconds.   Psychiatric: She has a normal mood and affect. Thought content normal.         ED Course   Procedures  Labs Reviewed - No data to display       Imaging Results              X-Ray Ankle Complete Left (Final result)  Result time 12/02/23 14:47:41      Final result by TAHIR Martin Sr., MD (12/02/23 14:47:41)                   Impression:      There is a small joint effusion in the tibiotalar joint.      Electronically signed by: Gialnuca Martin MD  Date:    12/02/2023  Time:    14:47               Narrative:    EXAMINATION:  XR ANKLE COMPLETE 3 VIEW LEFT    CLINICAL HISTORY:  Unspecified injury of left ankle, initial encounter    COMPARISON:  01/21/2021    FINDINGS:  There is no fracture. There is no dislocation.  There is a  small joint effusion in the tibiotalar joint.                                       Medications - No data to display  Medical Decision Making  Amount and/or Complexity of Data Reviewed  Radiology: ordered.     Details: Effusion at tibiotalar joint    Risk  Risk Details: Patient arrived to ER and figure 8 brace.  Patient advised to take anti-inflammatories and rest/ice.  Follow up with orthopedist if symptoms continue.                                      Clinical Impression:  Final diagnoses:  [S99.912A] Left ankle injury  [S93.402A] Sprain of left ankle, unspecified ligament, initial encounter (Primary)          ED Disposition Condition    Discharge Stable          ED Prescriptions       Medication Sig Dispense Start Date End Date Auth. Provider    ibuprofen (ADVIL,MOTRIN) 800 MG tablet Take 1 tablet (800 mg total) by mouth every 6 (six) hours as needed for Pain. 20 tablet 12/2/2023 -- Steve Justin Jr., FNP    traMADoL (ULTRAM) 50 mg tablet Take 1 tablet (50 mg total) by mouth every 6 (six) hours as needed for Pain. 12 tablet 12/2/2023 -- Steve Justin Jr., FNP          Follow-up Information       Follow up With Specialties Details Why Contact Info    Danika Fitzgerald MD Family Medicine In 1 week  [address] [facility] [address]  [phone]               Steve Justin Jr., FNP  12/02/23 9907

## 2023-12-29 ENCOUNTER — OFFICE VISIT (OUTPATIENT)
Dept: FAMILY MEDICINE | Facility: CLINIC | Age: 28
End: 2023-12-29
Attending: FAMILY MEDICINE
Payer: COMMERCIAL

## 2023-12-29 ENCOUNTER — LAB VISIT (OUTPATIENT)
Dept: LAB | Facility: HOSPITAL | Age: 28
End: 2023-12-29
Attending: FAMILY MEDICINE
Payer: COMMERCIAL

## 2023-12-29 VITALS
TEMPERATURE: 98 F | RESPIRATION RATE: 18 BRPM | HEART RATE: 84 BPM | OXYGEN SATURATION: 98 % | DIASTOLIC BLOOD PRESSURE: 76 MMHG | WEIGHT: 156.31 LBS | SYSTOLIC BLOOD PRESSURE: 114 MMHG | BODY MASS INDEX: 27.7 KG/M2 | HEIGHT: 63 IN

## 2023-12-29 DIAGNOSIS — E66.3 OVERWEIGHT (BMI 25.0-29.9): ICD-10-CM

## 2023-12-29 DIAGNOSIS — Z00.00 ANNUAL PHYSICAL EXAM: ICD-10-CM

## 2023-12-29 DIAGNOSIS — E04.9 GOITER: ICD-10-CM

## 2023-12-29 DIAGNOSIS — M25.572 BILATERAL ANKLE PAIN, UNSPECIFIED CHRONICITY: ICD-10-CM

## 2023-12-29 DIAGNOSIS — M25.571 BILATERAL ANKLE PAIN, UNSPECIFIED CHRONICITY: ICD-10-CM

## 2023-12-29 LAB
ALBUMIN SERPL BCP-MCNC: 4.1 G/DL (ref 3.5–5.2)
ALP SERPL-CCNC: 62 U/L (ref 55–135)
ALT SERPL W/O P-5'-P-CCNC: 11 U/L (ref 10–44)
ANION GAP SERPL CALC-SCNC: 11 MMOL/L (ref 8–16)
AST SERPL-CCNC: 17 U/L (ref 10–40)
BASOPHILS # BLD AUTO: 0.01 K/UL (ref 0–0.2)
BASOPHILS NFR BLD: 0.3 % (ref 0–1.9)
BILIRUB SERPL-MCNC: 1.9 MG/DL (ref 0.1–1)
BUN SERPL-MCNC: 13 MG/DL (ref 6–20)
CALCIUM SERPL-MCNC: 9.4 MG/DL (ref 8.7–10.5)
CHLORIDE SERPL-SCNC: 102 MMOL/L (ref 95–110)
CHOLEST SERPL-MCNC: 207 MG/DL (ref 120–199)
CHOLEST/HDLC SERPL: 2.8 {RATIO} (ref 2–5)
CO2 SERPL-SCNC: 26 MMOL/L (ref 23–29)
CREAT SERPL-MCNC: 0.9 MG/DL (ref 0.5–1.4)
DIFFERENTIAL METHOD BLD: ABNORMAL
EOSINOPHIL # BLD AUTO: 0 K/UL (ref 0–0.5)
EOSINOPHIL NFR BLD: 0.8 % (ref 0–8)
ERYTHROCYTE [DISTWIDTH] IN BLOOD BY AUTOMATED COUNT: 12.8 % (ref 11.5–14.5)
EST. GFR  (NO RACE VARIABLE): >60 ML/MIN/1.73 M^2
ESTIMATED AVG GLUCOSE: 108 MG/DL (ref 68–131)
GLUCOSE SERPL-MCNC: 83 MG/DL (ref 70–110)
HBA1C MFR BLD: 5.4 % (ref 4–5.6)
HCT VFR BLD AUTO: 40.7 % (ref 37–48.5)
HDLC SERPL-MCNC: 74 MG/DL (ref 40–75)
HDLC SERPL: 35.7 % (ref 20–50)
HGB BLD-MCNC: 13.1 G/DL (ref 12–16)
IMM GRANULOCYTES # BLD AUTO: 0 K/UL (ref 0–0.04)
IMM GRANULOCYTES NFR BLD AUTO: 0 % (ref 0–0.5)
LDLC SERPL CALC-MCNC: 121.8 MG/DL (ref 63–159)
LYMPHOCYTES # BLD AUTO: 2 K/UL (ref 1–4.8)
LYMPHOCYTES NFR BLD: 53.9 % (ref 18–48)
MCH RBC QN AUTO: 29.3 PG (ref 27–31)
MCHC RBC AUTO-ENTMCNC: 32.2 G/DL (ref 32–36)
MCV RBC AUTO: 91 FL (ref 82–98)
MONOCYTES # BLD AUTO: 0.4 K/UL (ref 0.3–1)
MONOCYTES NFR BLD: 11.6 % (ref 4–15)
NEUTROPHILS # BLD AUTO: 1.2 K/UL (ref 1.8–7.7)
NEUTROPHILS NFR BLD: 33.4 % (ref 38–73)
NONHDLC SERPL-MCNC: 133 MG/DL
NRBC BLD-RTO: 0 /100 WBC
PLATELET # BLD AUTO: 168 K/UL (ref 150–450)
PMV BLD AUTO: 12.4 FL (ref 9.2–12.9)
POTASSIUM SERPL-SCNC: 4.1 MMOL/L (ref 3.5–5.1)
PROT SERPL-MCNC: 7.4 G/DL (ref 6–8.4)
RBC # BLD AUTO: 4.47 M/UL (ref 4–5.4)
SODIUM SERPL-SCNC: 139 MMOL/L (ref 136–145)
TRIGL SERPL-MCNC: 56 MG/DL (ref 30–150)
TSH SERPL DL<=0.005 MIU/L-ACNC: 1.33 UIU/ML (ref 0.4–4)
WBC # BLD AUTO: 3.62 K/UL (ref 3.9–12.7)

## 2023-12-29 PROCEDURE — 3078F DIAST BP <80 MM HG: CPT | Mod: CPTII,S$GLB,, | Performed by: FAMILY MEDICINE

## 2023-12-29 PROCEDURE — 85025 COMPLETE CBC W/AUTO DIFF WBC: CPT | Performed by: FAMILY MEDICINE

## 2023-12-29 PROCEDURE — 3008F BODY MASS INDEX DOCD: CPT | Mod: CPTII,S$GLB,, | Performed by: FAMILY MEDICINE

## 2023-12-29 PROCEDURE — 99395 PREV VISIT EST AGE 18-39: CPT | Mod: S$GLB,,, | Performed by: FAMILY MEDICINE

## 2023-12-29 PROCEDURE — 1159F PR MEDICATION LIST DOCUMENTED IN MEDICAL RECORD: ICD-10-PCS | Mod: CPTII,S$GLB,, | Performed by: FAMILY MEDICINE

## 2023-12-29 PROCEDURE — 80053 COMPREHEN METABOLIC PANEL: CPT | Performed by: FAMILY MEDICINE

## 2023-12-29 PROCEDURE — 1160F RVW MEDS BY RX/DR IN RCRD: CPT | Mod: CPTII,S$GLB,, | Performed by: FAMILY MEDICINE

## 2023-12-29 PROCEDURE — 3078F PR MOST RECENT DIASTOLIC BLOOD PRESSURE < 80 MM HG: ICD-10-PCS | Mod: CPTII,S$GLB,, | Performed by: FAMILY MEDICINE

## 2023-12-29 PROCEDURE — 1160F PR REVIEW ALL MEDS BY PRESCRIBER/CLIN PHARMACIST DOCUMENTED: ICD-10-PCS | Mod: CPTII,S$GLB,, | Performed by: FAMILY MEDICINE

## 2023-12-29 PROCEDURE — 99395 PR PREVENTIVE VISIT,EST,18-39: ICD-10-PCS | Mod: S$GLB,,, | Performed by: FAMILY MEDICINE

## 2023-12-29 PROCEDURE — 80061 LIPID PANEL: CPT | Performed by: FAMILY MEDICINE

## 2023-12-29 PROCEDURE — 83036 HEMOGLOBIN GLYCOSYLATED A1C: CPT | Performed by: FAMILY MEDICINE

## 2023-12-29 PROCEDURE — 99999 PR PBB SHADOW E&M-EST. PATIENT-LVL IV: CPT | Mod: PBBFAC,,, | Performed by: FAMILY MEDICINE

## 2023-12-29 PROCEDURE — 36415 COLL VENOUS BLD VENIPUNCTURE: CPT | Mod: PO | Performed by: FAMILY MEDICINE

## 2023-12-29 PROCEDURE — 3074F PR MOST RECENT SYSTOLIC BLOOD PRESSURE < 130 MM HG: ICD-10-PCS | Mod: CPTII,S$GLB,, | Performed by: FAMILY MEDICINE

## 2023-12-29 PROCEDURE — 84443 ASSAY THYROID STIM HORMONE: CPT | Performed by: FAMILY MEDICINE

## 2023-12-29 PROCEDURE — 3074F SYST BP LT 130 MM HG: CPT | Mod: CPTII,S$GLB,, | Performed by: FAMILY MEDICINE

## 2023-12-29 PROCEDURE — 1159F MED LIST DOCD IN RCRD: CPT | Mod: CPTII,S$GLB,, | Performed by: FAMILY MEDICINE

## 2023-12-29 PROCEDURE — 3008F PR BODY MASS INDEX (BMI) DOCUMENTED: ICD-10-PCS | Mod: CPTII,S$GLB,, | Performed by: FAMILY MEDICINE

## 2023-12-29 PROCEDURE — 99999 PR PBB SHADOW E&M-EST. PATIENT-LVL IV: ICD-10-PCS | Mod: PBBFAC,,, | Performed by: FAMILY MEDICINE

## 2023-12-29 NOTE — PROGRESS NOTES
HISTORY OF PRESENT ILLNESS: Ms. Durán comes in today for annual wellness examination.  She is fasting and without taking medication today.     END OF LIFE DECISION: She does not have a living will. She does not desire life support.     Current Outpatient Medications   Medication Sig    ibuprofen (ADVIL,MOTRIN) 800 MG tablet Take 1 tablet (800 mg total) by mouth every 6 (six) hours as needed for Pain.    traMADoL (ULTRAM) 50 mg tablet Take 1 tablet (50 mg total) by mouth every 6 (six) hours as needed for Pain.      SCREENINGS:    LDLCALC: October 14, 2020.   Colonoscopy: Never.   Mammogram: Never.   Dexa Scan: Never.  GYN Exam: October 14, 2021 Pap smear, HPV - okay.   Eye Exam: 2020 per patient. She has not been wearing glasses.  HIVAb: October 14, 2020.   HCVAb: October 14, 2020.   PPD: Negative in the patient.      Immunizations:    Flu shot: October 13, 2021 at work. She declines.  HPV series: June 13, 2008, August 18, 2008, August 2, 2009.  HBV series: March 13, 2020, April 29, 2020, September 23, 2020.  Td: July 27, 2020.   COVID-19 (Pfizer) - January 21, 2021, February 11, 2021.    ROS:  GENERAL: Denies fever, chills, fatigue or unusual weight change. Appetite normal. Reports does not recently exercise. Monitors diet. Weight 67.6 kg (149 lb 0.5 oz) at October 14, 2021 visit.  SKIN: Denies rashes, itching, changes in mole, color or texture of skin or easy bruising.  HEAD:  Denies headaches or recent head trauma. Except reports occasional headaches and takes Ibuprofen with help.  EYES: Denies change in vision, pain, diplopia, redness or discharge.  EARS: Denies ear pain, discharge, vertigo or decreased hearing.  NOSE: Denies loss of smell, epistaxis or rhinitis.  MOUTH & THROAT: Denies hoarseness or change in voice. Denies excessive gum bleeding or mouth sores.  Denies sore throat.  NODES: Denies swollen glands.  CHEST: Denies BARGER, wheezing, cough, or sputum production.  CARDIOVASCULAR: Denies chest pain, PND,  "orthopnea or reduced exercise tolerance.  Denies palpitations.  ABDOMEN: Denies diarrhea, constipation, nausea, vomiting, abdominal pain, or blood in stool.  URINARY: Denies flank pain, dysuria or hematuria.  GENITOURINARY: Denies flank pain, dysuria, frequency or hematuria. No change in menses. Performs monthly breast exams does.  ENDOCRINE: Denies diabetes, thyroid, cholesterol problems. Performs home glucose checks  - N./A.  HEME/LYMPH: Denies bleeding problems.  PERIPHERAL VASCULAR:Denies claudication or cyanosis.  MUSCULOSKELETAL: Denies joint stiffness, pain or swelling. Denies edema. Except reports recent ankle(s) injury (9/24/2023 - right ankle; 12/2/2023 - left ankle) and takes Ibuprofen and/or Tramadol with some help but desires to see/consult with podiatry.   NEUROLOGIC: Denies history of seizures, tremors, paralysis, alteration of gait or coordination.  PSYCHIATRIC: Denies mood swings, depression, anxiety, homicidal or suicidal thoughts. Denies sleep problems.      PE:   VS: Blood Pressure 114/76   Pulse 84   Temperature 98 °F (36.7 °C) (Tympanic)   Respiration 18   Height 5' 3" (1.6 m)   Weight 70.9 kg (156 lb 4.9 oz)   Last Menstrual Period 12/15/2023 (Exact Date) Comment: Monthly  Oxygen Saturation 98%   Body Mass Index 27.69 kg/m²   APPEARANCE:  Well nourished, well developed female, pleasant and overweight, alert and oriented in no acute distress.    HEAD: Nontender. Full range of motion.  EYES: PERRL, conjunctiva pink, lids no edema.   EARS: External canal patent, no swelling or redness. TM's shiny and clear.  NOSE: Mucosa and turbinates pink, not swollen. No discharge. Nontender sinuses.  THROAT: No pharyngeal erythema or exudate. No stridor.   NECK: Supple, no mass. Non tender thyroid enlarged noted.  NODES: No cervical, axillary and inguinal lymph node enlargement.  CHEST: Normal respiratory effort. Lungs clear to auscultation.  CARDIOVASCULAR: Normal S1, S2. No rubs, murmurs or gallops. " PMI not displaced. No carotid bruit. Pedal pulses palpable bilaterally. No edema.  ABDOMEN: Bowel sounds present. Not distended. Soft. No tenderness, masses or organomegaly.  BREAST EXAM: Symmetrical, no external lesions, no discharge, no masses palpated.  PELVIC EXAM: No external lesions noted, no discharge, cervix appears normal, bimanual exam showed no uterine abnormalities and no adnexal masses. Urethra and bladder intact.  RECTAL EXAM: Not examined.  MUSCULOSKELETAL: No joint deformities or stiffness. She is ambulatory without problems. Slightly tender and slight swelling at left ankle with full range of motion noted.  Non tender right ankle with full range of motion noted.  SKIN: No rashes or suspicious lesions, normal color and turgor.  NEUROLOGIC:   Cranial Nerves: II-XII grossly intact.   DTR's: Knees, Ankles 2+ and equal bilaterally. Gait & Posture: Normal gait and fine motion.  PSYCHIATRIC:Patient alert, oriented x 3. Mood/Affect normal without acute anxiety or depression noted. Judgment/insight good as she makes appropriate decisions during today's exam.        ASSESSMENT:    ICD-10-CM ICD-9-CM    1. Annual physical exam  Z00.00 V70.0 Lipid Panel      Comprehensive Metabolic Panel      CBC Auto Differential      TSH      Hemoglobin A1C      2. Bilateral ankle pain, unspecified chronicity  M25.571 719.47 Ambulatory referral/consult to Podiatry    M25.572        3. Goiter  E04.9 240.9 US Soft Tissue Head Neck Thyroid      4. Overweight (BMI 25.0-29.9)  E66.3 278.02         PLAN:  1. Age-appropriate counseling-appropriate low-sodium, low-cholesterol, low carbohydrate diet and exercise daily, monthly breast self exam, annual wellness examination.   2. Patient advised to call for results.  3. Continue current medications.  4. Keep follow up with specialists.  5. Follow up in about 1 year (around 12/29/2024) for physical.       Answers submitted by the patient for this visit:  Review of Systems Questionnaire  (Submitted on 12/29/2023)  activity change: No  neck pain: No  hearing loss: No  rhinorrhea: No  trouble swallowing: No  eye discharge: No  visual disturbance: No  chest tightness: No  wheezing: No  chest pain: No  palpitations: No  blood in stool: No  constipation: No  vomiting: No  diarrhea: No  polydipsia: No  polyuria: No  difficulty urinating: No  hematuria: No  menstrual problem: No  dysuria: No  joint swelling: No  arthralgias: No  headaches: Yes  weakness: No  confusion: No  dysphoric mood: No

## 2024-01-04 ENCOUNTER — TELEPHONE (OUTPATIENT)
Dept: FAMILY MEDICINE | Facility: CLINIC | Age: 29
End: 2024-01-04
Payer: COMMERCIAL

## 2024-01-04 ENCOUNTER — PATIENT MESSAGE (OUTPATIENT)
Dept: FAMILY MEDICINE | Facility: CLINIC | Age: 29
End: 2024-01-04
Payer: COMMERCIAL

## 2024-01-04 NOTE — TELEPHONE ENCOUNTER
----- Message from Loreto Mack sent at 1/4/2024  3:06 PM CST -----  Pt states that she has some concerns with blood work results,Please call back at [phone].Thanks

## 2024-02-01 ENCOUNTER — OFFICE VISIT (OUTPATIENT)
Dept: PODIATRY | Facility: CLINIC | Age: 29
End: 2024-02-01
Attending: FAMILY MEDICINE
Payer: COMMERCIAL

## 2024-02-01 VITALS — WEIGHT: 156.31 LBS | HEIGHT: 63 IN | BODY MASS INDEX: 27.7 KG/M2

## 2024-02-01 DIAGNOSIS — M25.572 ACUTE LEFT ANKLE PAIN: Primary | ICD-10-CM

## 2024-02-01 DIAGNOSIS — M25.572 PAIN OF JOINT OF LEFT ANKLE AND FOOT: ICD-10-CM

## 2024-02-01 PROCEDURE — 99999 PR PBB SHADOW E&M-EST. PATIENT-LVL III: CPT | Mod: PBBFAC,,, | Performed by: PODIATRIST

## 2024-02-01 PROCEDURE — 1160F RVW MEDS BY RX/DR IN RCRD: CPT | Mod: CPTII,S$GLB,, | Performed by: PODIATRIST

## 2024-02-01 PROCEDURE — 1159F MED LIST DOCD IN RCRD: CPT | Mod: CPTII,S$GLB,, | Performed by: PODIATRIST

## 2024-02-01 PROCEDURE — 99243 OFF/OP CNSLTJ NEW/EST LOW 30: CPT | Mod: S$GLB,,, | Performed by: PODIATRIST

## 2024-02-01 PROCEDURE — 3008F BODY MASS INDEX DOCD: CPT | Mod: CPTII,S$GLB,, | Performed by: PODIATRIST

## 2024-02-01 NOTE — PROGRESS NOTES
Subjective:     Patient ID: Swati Durán is a 28 y.o. female.    Chief Complaint: Ankle Pain (Pt c/o left ankle pain 4/10 pt states she tumbled 12/3/2023, non-diabetic pt wears slippers, PCP Dr. Fitzgerald last seen 12-29-23)    Swati is a 28 y.o. female who presents to the podiatry clinic  with complaint of  left ankle pain. Onset of the symptoms was  12/02/2024 . Precipitating event: injured during tumbling . Current symptoms include: ability to bear weight, but with some pain. Aggravating factors: standing and walking. Symptoms have gradually worsened. Patient has had no prior foot problems. Evaluation to date: plain films: normal. Treatment to date: brace which is not very effective and walking boot helps . Patients rates pain 4/10 on pain scale. Patient points to left medial and lateral ankle. Patent states she worse walking boot for 6 weeks and ankle brace since. Patient states she does wear boot on and off because it feels better. Patient states she is unable to point toes on left foot. Patient has no other pedal complaints at this time.       Referring Provider: Danika Fitzgerald MD    Patient Active Problem List   Diagnosis    Chronic dislocation of right shoulder    Myofacial muscle pain    Rotator cuff tendinitis    Winged scapula of right side    Recurrent subluxation of shoulder    Instability of right shoulder joint    Decreased strength    Left shoulder pain    Goiter    Moderate left ankle sprain    Overweight (BMI 25.0-29.9)       Medication List with Changes/Refills   Current Medications    IBUPROFEN (ADVIL,MOTRIN) 800 MG TABLET    Take 1 tablet (800 mg total) by mouth every 6 (six) hours as needed for Pain.    TRAMADOL (ULTRAM) 50 MG TABLET    Take 1 tablet (50 mg total) by mouth every 6 (six) hours as needed for Pain.       Review of patient's allergies indicates:   Allergen Reactions    Food allergy formula  [glutamine-[id number]-selen-brom] Hives       Past Surgical History:   Procedure  Laterality Date    None         Family History   Problem Relation Age of Onset    Hypertension Mother     Other Mother         borderline diabetes    No Known Problems Father     No Known Problems Sister     No Known Problems Brother     Breast cancer Maternal Grandmother     Other Maternal Grandmother         borderline diabetes    Coronary artery disease Maternal Grandmother     Lung cancer Paternal Grandfather     Stroke Neg Hx        Social History     Socioeconomic History    Marital status: Single   Tobacco Use    Smoking status: Never    Smokeless tobacco: Never   Substance and Sexual Activity    Alcohol use: Yes     Alcohol/week: 0.0 standard drinks of alcohol     Comment: Occasionally    Drug use: No    Sexual activity: Yes     Partners: Male     Birth control/protection: Condom   Social History Narrative    She wear seatbelt. 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     Social Determinants of Health     Financial Resource Strain: Unknown (10/11/2021)    Overall Financial Resource Strain (CARDIA)     Difficulty of Paying Living Expenses: Patient declined   Food Insecurity: No Food Insecurity (10/11/2021)    Hunger Vital Sign     Worried About Running Out of Food in the Last Year: Never true     Ran Out of Food in the Last Year: Never true   Transportation Needs: No Transportation Needs (10/11/2021)    PRAPARE - Transportation     Lack of Transportation (Medical): No     Lack of Transportation (Non-Medical): No   Physical Activity: Inactive (12/29/2023)    Exercise Vital Sign     Days of Exercise per Week: 0 days     Minutes of Exercise per Session: 0 min   Stress: Stress Concern Present (10/11/2021)    [employer] [place] [employer] - Occupational Stress Questionnaire     Feeling of Stress : To some extent   Social Connections: Unknown (10/11/2021)    Social Connection and Isolation Panel [NHANES]     Frequency of Communication with Friends and Family: Three times a week     Frequency of Social  "Gatherings with Friends and Family: Once a week     Active Member of Clubs or Organizations: Patient declined     Attends Club or Organization Meetings: Patient declined     Marital Status: Never    Housing Stability: Low Risk  (10/11/2021)    Housing Stability Vital Sign     Unable to Pay for Housing in the Last Year: No     Number of Places Lived in the Last Year: 1     Unstable Housing in the Last Year: No       Vitals:    02/01/24 1351   Weight: 70.9 kg (156 lb 4.9 oz)   Height: 5' 3" (1.6 m)   PainSc:   4       Hemoglobin A1C   Date Value Ref Range Status   12/29/2023 5.4 4.0 - 5.6 % Final     Comment:     ADA Screening Guidelines:  5.7-6.4%  Consistent with prediabetes  >or=6.5%  Consistent with diabetes    High levels of fetal hemoglobin interfere with the HbA1C  assay. Heterozygous hemoglobin variants (HbS, HgC, etc)do  not significantly interfere with this assay.   However, presence of multiple variants may affect accuracy.         Review of Systems   Constitutional:  Negative for chills and fever.   Respiratory:  Negative for shortness of breath.    Cardiovascular:  Negative for chest pain, palpitations, orthopnea, claudication and leg swelling.   Gastrointestinal:  Negative for diarrhea, nausea and vomiting.   Musculoskeletal:  Positive for joint pain (left ankle).   Skin:  Negative for rash.   Neurological:  Negative for dizziness, tingling, sensory change, focal weakness and weakness.   Psychiatric/Behavioral: Negative.               Objective:       PHYSICAL EXAM: Apperance: Alert and orient in no distress,well developed, and with good attention to grooming and body habits  Patient presents ambulating in mules  Lower Extremity Physical Exam:  VASCULAR: Dorsalis pedis pulses 2/4 left and Posterior Tibial pulses 2/4 left.   DERMATOLOGICAL: No skin rashes, subcutaneous nodules, lesions, or ulcers observed bilateral.  NEUROLOGICAL: Light touch, sharp-dull, proprioception all present and equal " bilaterally.    MUSCULOSKELETAL: Muscle strength is 5/5 for foot inverters, everters, plantarflexors, and dorsiflexors. Muscle tone is normal. (+) pain on palpation of left ankle inversion and eversion, and plantarflexion. Tenderness on palpation of left lateral ankle at the ATFL and medial ankle.       TEST RESULTS: Radiographs of left foot/ankle taken 12/02/2023 reveals no acute fractures or dislocations.           Assessment:       ICD-10-CM ICD-9-CM   1. Acute left ankle pain  M25.572 719.47   2. Pain of joint of left ankle and foot  M25.572 719.47       Plan:   Acute left ankle pain  -     Ambulatory referral/consult to Podiatry  -     X-Ray Ankle Complete Left; Future; Expected date: 02/01/2024  -     X-Ray Foot Complete Left; Future; Expected date: 02/01/2024  -     MRI Ankle Without Contrast Left; Future; Expected date: 02/01/2024    Pain of joint of left ankle and foot  -     MRI Ankle Without Contrast Left; Future; Expected date: 02/01/2024      I counseled the patient on her conditions, regarding findings of my examination, my impressions, and usual treatment plan.   Reviewed previous left ankle x-rays and exam room with patient.  Patient instructed to continue either ankle brace or walking boot until MRI completed.  Patient to be contacted with MRI reserve results or to return if symptoms worsen.       Dayanna Morelos DPM  [facility]

## 2024-03-04 ENCOUNTER — HOSPITAL ENCOUNTER (OUTPATIENT)
Dept: RADIOLOGY | Facility: HOSPITAL | Age: 29
Discharge: HOME | End: 2024-03-04
Attending: FAMILY MEDICINE
Payer: COMMERCIAL

## 2024-03-04 DIAGNOSIS — E04.9 GOITER: ICD-10-CM

## 2024-03-04 PROCEDURE — 76536 US EXAM OF HEAD AND NECK: CPT | Mod: TC

## 2024-03-04 PROCEDURE — 76536 US EXAM OF HEAD AND NECK: CPT | Mod: 26,,, | Performed by: RADIOLOGY

## 2024-03-11 ENCOUNTER — HOSPITAL ENCOUNTER (OUTPATIENT)
Dept: RADIOLOGY | Facility: HOSPITAL | Age: 29
Discharge: HOME | End: 2024-03-11
Attending: PODIATRIST
Payer: COMMERCIAL

## 2024-03-11 DIAGNOSIS — M25.572 PAIN OF JOINT OF LEFT ANKLE AND FOOT: ICD-10-CM

## 2024-03-11 DIAGNOSIS — M25.572 ACUTE LEFT ANKLE PAIN: ICD-10-CM

## 2024-03-11 PROCEDURE — 73721 MRI JNT OF LWR EXTRE W/O DYE: CPT | Mod: 26,LT,, | Performed by: RADIOLOGY

## 2024-03-11 PROCEDURE — 73721 MRI JNT OF LWR EXTRE W/O DYE: CPT | Mod: TC,LT

## 2024-03-15 DIAGNOSIS — S93.492A SPRAIN OF ANTERIOR TALOFIBULAR LIGAMENT OF LEFT ANKLE, INITIAL ENCOUNTER: Primary | ICD-10-CM

## 2024-03-15 DIAGNOSIS — M25.572 PAIN OF JOINT OF LEFT ANKLE AND FOOT: ICD-10-CM

## 2024-03-19 ENCOUNTER — CLINICAL SUPPORT (OUTPATIENT)
Dept: REHABILITATION | Facility: HOSPITAL | Age: 29
End: 2024-03-19
Attending: PODIATRIST
Payer: COMMERCIAL

## 2024-03-19 DIAGNOSIS — R68.89 DECREASED FUNCTIONAL ACTIVITY TOLERANCE: Primary | ICD-10-CM

## 2024-03-19 DIAGNOSIS — R26.9 GAIT ABNORMALITY: ICD-10-CM

## 2024-03-19 DIAGNOSIS — R26.89 BALANCE PROBLEM: ICD-10-CM

## 2024-03-19 DIAGNOSIS — M25.572 PAIN OF JOINT OF LEFT ANKLE AND FOOT: ICD-10-CM

## 2024-03-19 DIAGNOSIS — S93.492A SPRAIN OF ANTERIOR TALOFIBULAR LIGAMENT OF LEFT ANKLE, INITIAL ENCOUNTER: ICD-10-CM

## 2024-03-19 DIAGNOSIS — R53.1 DECREASED STRENGTH: ICD-10-CM

## 2024-03-19 PROBLEM — M25.512 LEFT SHOULDER PAIN: Status: RESOLVED | Noted: 2020-08-06 | Resolved: 2024-03-19

## 2024-03-19 PROCEDURE — 97112 NEUROMUSCULAR REEDUCATION: CPT

## 2024-03-19 PROCEDURE — 97161 PT EVAL LOW COMPLEX 20 MIN: CPT

## 2024-03-19 NOTE — PLAN OF CARE
[facility]   Physical Therapy Initial Evaluation      Date: 3/19/2024   Name: Swati Durán  Clinic Number: [id number]    Therapy Diagnosis:    Encounter Diagnoses   Name Primary?    Sprain of anterior talofibular ligament of left ankle, initial encounter     Pain of joint of left ankle and foot     Decreased functional activity tolerance Yes    Gait abnormality     Balance problem     Decreased strength       Physician: Dayanna Morelos DPM     Physician Orders: PT Eval and Treat  Medical Diagnosis from Referral: Sprain of anterior talofibular ligament of left ankle, initial encounter; Pain of joint of left ankle and foot  Evaluation Date: 3/19/2024  Authorization Period Expiration: 12/31/24  Plan of Care Expiration: 5/19/2024  Progress Note Due: 4/19/2024  Visit # / Visits authorized: 1/1  FOTO: 1/3 (last performed on 3/19/2024)      Precautions: Standard    Time In: 7:30 am   Time Out: 8:32 am  Total Billable Time (timed & untimed codes): 60 minutes    SUBJECTIVE   Date of onset: December 2023    History of current condition - Swati reports Reports was doing a round off back handspring lay out, landed wrong and sprained left ankle in December. Was in boot for 4 weeks. Just has not healed - has not tried any strengthening - just rest. Does usually wear brace, pain with stairs putting shoes on, sitting with legs crossed.  Can't go into releve - feels weak and unstable. Pain if tries to hop.  Feels pain along medial post tibialis area and lateral calcaneus.   Tapes and wears brace.  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  Does not recall hurting this ankle - did hurt right ankle and was in boot until end of October 2023. Difficulty wearing heels feels unstable - feels like left ankle is more unstable. Can wear heel on right ankle. (No rehab for right ankle was in boot for 4+ weeks)     Current Activity Level: Not able to do much  right now, dancing/cheering - preparing for [employer] tryouts.    Falls: [] No  [x] Yes: landed clean and then in video looks like foot slid out from underneath her and she flew backwards.    Imaging: [x] Xray [x] MRI [] CT:   Impression:  1.  Subacute grade 2 sprain of the ATFL and CFL.  2.  Possible trace marrow edema of the cuboid without evidence of fracture.  3.  Small tibiotalar joint effusion.  Otherwise unremarkable exam.    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 Does have to demo dance moves.  Prior Level of Function: Independent with all activities  Current Level of Function:  Pain with all cheer and dance activities, pain up and down stairs,     Pain:  Current 6/10, worst 8/10, best 3/10   Location: see above  Description: Sharp, Shooting, and stabbing  Aggravating Factors: increased activity on left lower extremity increases pain.  Easing Factors: rest, elevate.    Patients goals: Be able to do dance class in heels, no pain, return to tumbling, be able to try out for [employer]. Be able to go through work day without modifications.    Medical History:   Past Medical History:   Diagnosis Date    Goiter 10/14/2020       Surgical History:   Swati Durán  has a past surgical history that includes None.    Medications:   Swati has a current medication list which includes the following prescription(s): ibuprofen and tramadol.    Allergies:   Review of patient's allergies indicates:   Allergen Reactions    Food allergy formula  [[id number]-selen-brom] Hives        OBJECTIVE     Posture:  Patient presents with postural abnormalities which include:    [] Forward Head   [] Increased Lumbar Lordosis   [] Rounded Shoulder   [x] Flat Back posture   [] Increased Thoracic Kyphosis [] Pes Planus   [] Increased Trunk Sway  []  Valgus knee position   [] Increased Trunk Rotation  [] Varus knee position   [] Increased cervical lordosis [] Other:    Sensation:    Sensation to light touch over UE's is  [] Intact [] Impaired [x] Defer  Sensation to light touch over LE's is  [x] Intact [] Impaired [] Defer    Reflexes:  Patellar    [x] Defer [] Normal [] Hyper []  Hypo   Achilles   [x] Defer [] Normal [] Hyper []  Hypo  Tricep   [x] Defer [] Normal [] Hyper []  Hypo  Brachioradialis [x] Defer [] Normal [] Hyper []  Hypo      Gait Analysis: The patient ambulated with the following assistive device: none; the patient presents with the following gait abnormalities: antalgic gait, decreased stability left ankle noted on stance phase.    Balance  Right   (seconds) Left  (seconds) Norms   Single Leg Stance 30 5 Less than 4.9 sec high risk  5-6.4 sec increased risk  6.5 or greater low risk             Range of Motion:    Ankle/Foot AROM/PROM Right Left Pain/Dysfunction with Movement   Dorsiflexion (20º) 10 -5    Plantarflexion (50º) 60 40    Inversion (35º) 55 60    Eversion (15º) 25 15    Great Toe Extension (70º) 60 65      Strength:    L/E MMT Right  (spine) Left Pain/Dysfunction with Movement   Hip Flexion  4/5 4/5 + trunk shift   Knee Extension 4/5 4/5 + trunk shift   Knee Flexion 4/5 4/5 + trunk shift   Hip IR 4/5 4/5 + trunk shift   Hip ER 4/5 4/5 + trunk shift   Ankle DF 4/5 4-/5    Ankle PF 4/5 4-/5    Ankle Inversion 4-/5 3+/5 Pain on left    Ankle Eversion 4-/5 3+/5 Pain on left        Muscle Length:   defer    Joint Mobility:   Decreased fibular head superior glide, defer talar mobility due to pain.   Forefoot mobility appears WNL.    Special Tests:  defer    Palpation:  Tender       FOTO:    Intake Outcome Measure for FOTO ankle Survey    Therapist reviewed FOTO scores for Swati Durán on 3/19/2024.   FOTO documents entered into [employer] - see Media section or FOTO account episode details..    Intake Score: see below            TREATMENT     Total Treatment time (time-based codes) separate from Evaluation: (20) minutes     Swati received the treatments listed below:   Patient received Blood Flow Restriction after being screened for contraindications, assessed for precautions, and educated in the benefits/risks associated with the use of blood flow restriction training.  left Lower Extremity at 60-80% occlusion @ proximal thigh -- Cuff Size: blue    8 minutes or less of occlusion for each exercise was performed, with deflations between each exercise for a minimum of 1 minute    Blood flow restriction  Performed  Reps: 30, 15, 15, 15  (75 total) with 30s rest between reps   Sitting ankle DF/PF x Body Weight   Sitting ankle inversion/eversion x Body Weight     Body Weight       Swati participated in neuromuscular re-education activities to improve: Proprioception for 16 minutes. The following activities were included:      Intervention 12/12/2023  Parameters   Sitting ankle DF/PF x With BFR   Sitting ankle inversion/eversion x With BFR                             and   Swati received the following manual therapy techniques: Joint mobilizations were applied to the: left lower extremity for 3 minutes, including:    Manual Intervention 3/19/2024     Soft Tissue Mobilization     Joint Mobilizations x    Mobilization with movement          Functional Dry Needling         Blood flow restriction was performed to the left lower extremity today for 16 min. The Limb Occlusion Pressure of the left lower extremity was at 80% and each exercise was around 8 minutes of occlusion. Patient tolerated this occlusion pressure very well with only reports of significant muscle fatigue but no pain.      Plan for Next Visit: further evaluation of ankle joint mobility, strengthening and endurance activities for ankles and hip/core stability.        PATIENT EDUCATION AND HOME EXERCISES     Education provided: (during treatment session) minutes  Home  exercise program, and importance of strengthening    Written Home Exercises Provided: yes.  Exercises were reviewed and Swati was able to demonstrate them prior to the end of the session.  Swati demonstrated good  understanding of the education provided. See EMR under Patient Instructions for exercises provided during therapy sessions.    ASSESSMENT     Swati is a 28 y.o. female referred to outpatient Physical Therapy with a medical diagnosis of Sprain of anterior talofibular ligament of left ankle, initial encounter; Pain of joint of left ankle and foot . The patient presents with signs and symptoms consistent with diagnosis along with history of right ankle injury and impairments which include weakness, impaired endurance, impaired functional mobility, impaired balance, decreased lower extremity function, pain, abnormal tone, decreased ROM, edema, and impaired joint extensibility.      Patient prognosis is Good, if patient is consistent and compliant with Physical Therapy and home exercise program.  Patient will benefit from skilled outpatient Physical Therapy to address the deficits stated above and in the chart below, provide patient/family education, and to maximize patient's level of independence.     Plan of care discussed with patient: Yes  Patient's spiritual, cultural and educational needs considered and patient is agreeable to the plan of care and goals as stated below:     Anticipated Barriers for therapy: co-morbidities, chronicity of condition, adherence to treatment plan, financial limitations, occupation, and coping style      Medical Necessity is demonstrated by the following   History  Co-morbidities and personal factors that may impact the plan of care [x] LOW: no personal factors / co-morbidities  [] MODERATE: 1-2 personal factors / co-morbidities  [] HIGH: 3+ personal factors / co-morbidities    Moderate / High Support Documentation:   Past Medical History:   Diagnosis Date    Goiter  10/14/2020         Examination  Body Structures and Functions, activity limitations and participation restrictions that may impact the plan of care [] LOW: addressing 1-2 elements  [x] MODERATE: 3+ elements  [] HIGH: 4+ elements (please support below)    Moderate / High Support Documentation: See evaluation / objective measurements above and FOTO.     Clinical Presentation [] LOW: stable  [x] MODERATE: Evolving  [] HIGH: Unstable     Decision Making/ Complexity Score: low         Goals:  Reviewed: 3/19/2024      Short Term Goals: In 4 weeks  Progress Date   1.Patient to be educated on HEP. [x] Progressing  [] MET   [] Not MET   [] Not tested    2.Patient to increase left ankle range of motion, in order to improve available range of motion for ADL's.  [x] Progressing  [] MET   [] Not MET  [] Not tested    3.Patient to increase bilateral LE strength by 1/2 grade, in order to improve endurance and increase ability to perform all functional activities for increased time.  [] Progressing  [] MET   [] Not MET  [] Not tested    4.Patient to have pain less than 5/10 at worst, to improve QOL. [x] Progressing  [] MET   [] Not MET  [] Not tested    5.Patient to improve score on the FOTO, to improve QOL. [x] Progressing  [] MET   [] Not MET  [] Not tested    6. Patient to improve score on single leg balance in order to decrease fall risk. [x] Progressing  [] MET   [] Not MET  [] Not tested      Long Term Goals: In 8 weeks Progress Date   1.Patient to improve score on the FOTO predicted score or better, to improve QOL. [x] Progressing  [] MET   [] Not MET  [] Not tested    2. Patient to increase bilateral LE strength to 5/5 or greater, in order to improve endurance and increase ability to perform all functional activities for increased time. [x] Progressing  [] MET   [] Not MET  [] Not tested    3. Patient to have decreased pain to 2/10 at worst, to improve QOL. [x] Progressing  [] MET   [] Not MET  [] Not tested    4. Patient  to normalize score on single leg balance, in order to improve endurance and decrease fall risk. [x] Progressing  [] MET   [] Not MET  [] Not tested    5. Patient to perform daily activities including tumbling and dance without increased symptoms. [x] Progressing  [] MET   [] Not MET  [] Not tested      PLAN     Plan of care Certification: 3/19/2024  to 5/19/24.    Outpatient Physical Therapy 2 times weekly for 8 weeks to include any combination of the following interventions: electrical stimulation (PRN), Gait Training, Manual Therapy, Neuromuscular Re-ed, Patient Education/Self Care, Therapeutic Activites, Therapeutic Exercise, and Dry Needling    Ricarda Quintero PT

## 2024-03-20 PROBLEM — R26.89 BALANCE PROBLEM: Status: ACTIVE | Noted: 2024-03-20

## 2024-03-20 PROBLEM — R26.9 GAIT ABNORMALITY: Status: ACTIVE | Noted: 2024-03-20

## 2024-03-20 PROBLEM — R68.89 DECREASED FUNCTIONAL ACTIVITY TOLERANCE: Status: ACTIVE | Noted: 2024-03-20

## 2024-03-20 PROBLEM — R53.1 DECREASED STRENGTH: Status: ACTIVE | Noted: 2024-03-20

## 2024-04-02 ENCOUNTER — CLINICAL SUPPORT (OUTPATIENT)
Dept: REHABILITATION | Facility: HOSPITAL | Age: 29
End: 2024-04-02
Payer: COMMERCIAL

## 2024-04-02 DIAGNOSIS — R68.89 DECREASED FUNCTIONAL ACTIVITY TOLERANCE: Primary | ICD-10-CM

## 2024-04-02 DIAGNOSIS — R26.9 GAIT ABNORMALITY: ICD-10-CM

## 2024-04-02 DIAGNOSIS — R26.89 BALANCE PROBLEM: ICD-10-CM

## 2024-04-02 DIAGNOSIS — R53.1 DECREASED STRENGTH: ICD-10-CM

## 2024-04-02 PROCEDURE — 97112 NEUROMUSCULAR REEDUCATION: CPT

## 2024-04-02 PROCEDURE — 97110 THERAPEUTIC EXERCISES: CPT

## 2024-04-02 NOTE — PROGRESS NOTES
"  [facility]   Physical Therapy Treatment Note        Name: Swati Durán  Clinic Number: [id number]    Therapy Diagnosis:   Encounter Diagnoses   Name Primary?    Decreased functional activity tolerance Yes    Gait abnormality     Balance problem     Decreased strength      Physician: Dayanna Morelos DPM    Visit Date: 4/2/2024    Physician Orders: PT Eval and Treat  Medical Diagnosis from Referral: Sprain of anterior talofibular ligament of left ankle, initial encounter; Pain of joint of left ankle and foot  Evaluation Date: 3/19/2024  Authorization Period Expiration: 12/31/24  Plan of Care Expiration: 5/19/2024  Progress Note Due: 4/19/2024  Visit # / Visits authorized: 1/20 (+1)  FOTO: 1/3 (last performed on 3/19/2024)       Precautions: Standard  PTA Visit #: 0/5     Time In: 8:40 am  Time Out: 9:30 am  Total Billable Time: 47 minutes      SUBJECTIVE     Pt reports: exercises are going OK - she can feel them. She reports that her legs "feel like jello" at the end of treatment session. She has been walking about a mile with ankle brace on and no increased pain after.    She was compliant with home exercise program.  Response to previous treatment: no change  Functional change: no change    Pain: NT/10  Location: left ankle pain along medial post tibialis area and lateral calcaneus      OBJECTIVE     Objective Measures updated at progress report unless specified.       TREATMENT     Swati received the treatments listed below:      Patient received Blood Flow Restriction after being screened for contraindications, assessed for precautions, and educated in the benefits/risks associated with the use of blood flow restriction training.  left Lower Extremity at 60-80% occlusion @ proximal thigh -- Cuff Size: blue     8 minutes or less of occlusion for each exercise was performed, with deflations between each exercise for a minimum of 1 minute     Blood flow restriction  Performed  " Reps: 30, 15, 15, 15  (75 total) with 30s rest between reps   Sitting ankle DF/PF x Body Weight   Supine ankle inversion/eversion x Body Weight   MOBU Board  evens x  Body Weight        therapeutic exercises to develop strength, endurance, ROM, flexibility, and core stabilization for 12 minutes including:    Intervention 4/2/2024  Parameters   Upright Bike  x 7 min        Ankle theraband  x  x 10x/ 2 sets red band; DF/PF  10x/ 2 sets; inversion/eversion                                               manual therapy techniques: Joint mobilizations, Myofacial release, and Soft tissue Mobilization were applied to the: left LE for 5 minutes, including:    Manual Intervention 4/2/2024     Soft Tissue Mobilization x left lateral calf muscle's   Joint Mobilizations x DF; fibular glides superior   Mobilization with movement          Functional Dry Needling           neuromuscular re-education activities to improve: Balance, Coordination, Proprioception, and Posture for 30 minutes. The following activities were included:    Intervention 4/2/2024  Parameters   Sitting ankle DF/PF With BFR    Supine ankle inversion/eversion With BFR    MOBU Board  evens With BFR         Toe yoga x 3 min each                    Swati participated in dynamic functional therapeutic activities to improve functional performance for 0 minutes, including:    Intervention 4/2/2024  Parameters                                                                Plan for Next Visit: Progress with strengthening for ankles and LE's        PATIENT EDUCATION AND HOME EXERCISES     Home Exercises Provided and Patient Education Provided     Education provided:   - home exercise program     Written Home Exercises Provided: Patient instructed to cont prior HEP. Exercises were reviewed and Swati was able to demonstrate them prior to the end of the session.  Swati demonstrated good  understanding of the education provided. See EMR under Patient Instructions for  exercises provided during therapy sessions      ASSESSMENT     Patient tolerated all treatment well and was able to progress with treatment as noted. She was encouraged to participate with all activities and was cued during treatment as needed for correction of technique. Encouraged home exercise program.    Swati Is progressing well towards her goals.   Pt prognosis is Good.     Pt will continue to benefit from skilled outpatient physical therapy to address the deficits listed in the problem list box on initial evaluation, provide pt/family education and to maximize pt's level of independence in the home and community environment.     Pt's spiritual, cultural and educational needs considered and pt agreeable to plan of care and goals.     Anticipated barriers to physical therapy:  co-morbidities, chronicity of condition, adherence to treatment plan, financial limitations, occupation, and coping style     Goals:   Reviewed: 4/2/2024      Short Term Goals: In 4 weeks  Progress Date   1.Patient to be educated on HEP. [x] Progressing  [] MET   [] Not MET   [] Not tested     2.Patient to increase left ankle range of motion, in order to improve available range of motion for ADL's.  [x] Progressing  [] MET   [] Not MET  [] Not tested     3.Patient to increase bilateral LE strength by 1/2 grade, in order to improve endurance and increase ability to perform all functional activities for increased time.  [x] Progressing  [] MET   [] Not MET  [] Not tested     4.Patient to have pain less than 5/10 at worst, to improve QOL. [x] Progressing  [] MET   [] Not MET  [] Not tested     5.Patient to improve score on the FOTO, to improve QOL. [x] Progressing  [] MET   [] Not MET  [] Not tested     6. Patient to improve score on single leg balance in order to decrease fall risk. [x] Progressing  [] MET   [] Not MET  [] Not tested        Long Term Goals: In 8 weeks Progress Date   1.Patient to improve score on the FOTO predicted score  or better, to improve QOL. [x] Progressing  [] MET   [] Not MET  [] Not tested     2. Patient to increase bilateral LE strength to 5/5 or greater, in order to improve endurance and increase ability to perform all functional activities for increased time. [x] Progressing  [] MET   [] Not MET  [] Not tested     3. Patient to have decreased pain to 2/10 at worst, to improve QOL. [x] Progressing  [] MET   [] Not MET  [] Not tested     4. Patient to normalize score on single leg balance, in order to improve endurance and decrease fall risk. [x] Progressing  [] MET   [] Not MET  [] Not tested     5. Patient to perform daily activities including tumbling and dance without increased symptoms. [x] Progressing  [] MET   [] Not MET  [] Not tested          PLAN     Monitor response to today's treatment session and progress with Physical Therapy plan of care as indicated.    Plan of care Certification: 3/19/2024 to 5/19/24.     Outpatient Physical Therapy 2 times weekly for 8 weeks to include any combination of the following interventions: electrical stimulation (PRN), Gait Training, Manual Therapy, Neuromuscular Re-ed, Patient Education/Self Care, Therapeutic Activites, Therapeutic Exercise, and Dry Needling    Ricarda Quintero PT

## 2024-04-04 ENCOUNTER — CLINICAL SUPPORT (OUTPATIENT)
Dept: REHABILITATION | Facility: HOSPITAL | Age: 29
End: 2024-04-04
Payer: COMMERCIAL

## 2024-04-04 DIAGNOSIS — R26.89 BALANCE PROBLEM: ICD-10-CM

## 2024-04-04 DIAGNOSIS — R68.89 DECREASED FUNCTIONAL ACTIVITY TOLERANCE: Primary | ICD-10-CM

## 2024-04-04 DIAGNOSIS — R53.1 DECREASED STRENGTH: ICD-10-CM

## 2024-04-04 DIAGNOSIS — R26.9 GAIT ABNORMALITY: ICD-10-CM

## 2024-04-04 PROCEDURE — 97110 THERAPEUTIC EXERCISES: CPT

## 2024-04-04 PROCEDURE — 97112 NEUROMUSCULAR REEDUCATION: CPT

## 2024-04-04 NOTE — PROGRESS NOTES
"  [facility]   Physical Therapy Treatment Note        Name: Swati Durán  Clinic Number: [id number]    Therapy Diagnosis:   Encounter Diagnoses   Name Primary?    Decreased functional activity tolerance Yes    Gait abnormality     Balance problem     Decreased strength      Physician: Dayanna Morelos DPM    Visit Date: 4/4/2024    Physician Orders: PT Eval and Treat  Medical Diagnosis from Referral: Sprain of anterior talofibular ligament of left ankle, initial encounter; Pain of joint of left ankle and foot  Evaluation Date: 3/19/2024  Authorization Period Expiration: 12/31/24  Plan of Care Expiration: 5/19/2024  Progress Note Due: 4/19/2024  Visit # / Visits authorized: 2/20 (+1)  FOTO: 1/3 (last performed on 3/19/2024)       Precautions: Standard  PTA Visit #: 0/5     Time In: 1:30 pm  Time Out: 2:30 pm  Total Billable Time: 47 minutes      SUBJECTIVE     Pt reports: she has decided to put off [employer] try outs for another year - she just does not feel ready. She reports that she has been doing exercises' and has been surprised at how sore she has been from the toe yoga. She does feel "like jello" again at end of today's treatment session.    She was compliant with home exercise program.  Response to previous treatment: no change  Functional change: no change    Pain: NT/10  Location: left ankle pain along medial post tibialis area and lateral calcaneus      OBJECTIVE     Objective Measures updated at progress report unless specified.       TREATMENT     Swati received the treatments listed below:      Patient received Blood Flow Restriction after being screened for contraindications, assessed for precautions, and educated in the benefits/risks associated with the use of blood flow restriction training.  left Lower Extremity at 60-80% occlusion @ proximal thigh -- Cuff Size: blue     8 minutes or less of occlusion for each exercise was performed, with deflations between " each exercise for a minimum of 1 minute     Blood flow restriction  Performed  Reps: 30, 15, 15, 15  (75 total) with 30s rest between reps   Sitting ankle DF/PF x Body Weight   Supine ankle inversion/eversion x Body Weight   MOBU Board  evens x  Body Weight        therapeutic exercises to develop strength, endurance, ROM, flexibility, and core stabilization for 12 minutes including:    Intervention 4/4/2024  Parameters   Upright Bike  x 7 min        Ankle theraband  x  x 10x/ 2 sets red band; DF  10x/ 2 sets PF  10x/ 2 sets; inversion  10x/ 2 sets eversion                                               manual therapy techniques: Joint mobilizations, Myofacial release, and Soft tissue Mobilization were applied to the: left LE for 0 minutes, including:    Manual Intervention 4/4/2024     Soft Tissue Mobilization - left lateral calf muscle's   Joint Mobilizations - DF; fibular glides superior   Mobilization with movement          Functional Dry Needling           neuromuscular re-education activities to improve: Balance, Coordination, Proprioception, and Posture for 30 minutes. The following activities were included:    Intervention 4/4/2024  Parameters   Sitting ankle DF/PF With BFR    Supine ankle inversion/eversion With BFR    MOBU Board  evens With BFR         Toe yoga x 3 min each   bridge - 10x/ 3 sets               Swati participated in dynamic functional therapeutic activities to improve functional performance for 0 minutes, including:    Intervention 4/4/2024  Parameters                                                                Plan for Next Visit: Progress with strengthening for ankles and LE's        PATIENT EDUCATION AND HOME EXERCISES     Home Exercises Provided and Patient Education Provided     Education provided:   - home exercise program     Written Home Exercises Provided: Patient instructed to cont prior HEP. Exercises were reviewed and Swati was able to demonstrate them prior to the end of  the session.  Swati demonstrated good  understanding of the education provided. See EMR under Patient Instructions for exercises provided during therapy sessions      ASSESSMENT     Blood flow restriction was performed to the left lower extremity today for 24 min. The Limb Occlusion Pressure of the left lower extremity was at 60-80% and each exercise was around 8 minutes of occlusion. Patient tolerated this occlusion pressure very well with only reports of significant muscle fatigue but no pain.      Patient tolerated all treatment well and was able to complete treatment as noted. She reports fatigue with BFR and does feel muscle soreness but no pain post treatment session. Encouraged home exercise program.    Swati Is progressing well towards her goals.   Pt prognosis is Good.     Pt will continue to benefit from skilled outpatient physical therapy to address the deficits listed in the problem list box on initial evaluation, provide pt/family education and to maximize pt's level of independence in the home and community environment.     Pt's spiritual, cultural and educational needs considered and pt agreeable to plan of care and goals.     Anticipated barriers to physical therapy:  co-morbidities, chronicity of condition, adherence to treatment plan, financial limitations, occupation, and coping style     Goals:   Reviewed: 4/4/2024      Short Term Goals: In 4 weeks  Progress Date   1.Patient to be educated on HEP. [x] Progressing  [] MET   [] Not MET   [] Not tested     2.Patient to increase left ankle range of motion, in order to improve available range of motion for ADL's.  [x] Progressing  [] MET   [] Not MET  [] Not tested     3.Patient to increase bilateral LE strength by 1/2 grade, in order to improve endurance and increase ability to perform all functional activities for increased time.  [x] Progressing  [] MET   [] Not MET  [] Not tested     4.Patient to have pain less than 5/10 at worst, to improve  QOL. [x] Progressing  [] MET   [] Not MET  [] Not tested     5.Patient to improve score on the FOTO, to improve QOL. [x] Progressing  [] MET   [] Not MET  [] Not tested     6. Patient to improve score on single leg balance in order to decrease fall risk. [x] Progressing  [] MET   [] Not MET  [] Not tested        Long Term Goals: In 8 weeks Progress Date   1.Patient to improve score on the FOTO predicted score or better, to improve QOL. [x] Progressing  [] MET   [] Not MET  [] Not tested     2. Patient to increase bilateral LE strength to 5/5 or greater, in order to improve endurance and increase ability to perform all functional activities for increased time. [x] Progressing  [] MET   [] Not MET  [] Not tested     3. Patient to have decreased pain to 2/10 at worst, to improve QOL. [x] Progressing  [] MET   [] Not MET  [] Not tested     4. Patient to normalize score on single leg balance, in order to improve endurance and decrease fall risk. [x] Progressing  [] MET   [] Not MET  [] Not tested     5. Patient to perform daily activities including tumbling and dance without increased symptoms. [x] Progressing  [] MET   [] Not MET  [] Not tested          PLAN     Monitor response to today's treatment session and progress with Physical Therapy plan of care as indicated.    Plan of care Certification: 3/19/2024 to 5/19/24.     Outpatient Physical Therapy 2 times weekly for 8 weeks to include any combination of the following interventions: electrical stimulation (PRN), Gait Training, Manual Therapy, Neuromuscular Re-ed, Patient Education/Self Care, Therapeutic Activites, Therapeutic Exercise, and Dry Needling    Ricarda Quintero, PT

## 2024-04-08 ENCOUNTER — CLINICAL SUPPORT (OUTPATIENT)
Dept: REHABILITATION | Facility: HOSPITAL | Age: 29
End: 2024-04-08
Payer: COMMERCIAL

## 2024-04-08 DIAGNOSIS — R53.1 DECREASED STRENGTH: ICD-10-CM

## 2024-04-08 DIAGNOSIS — R26.89 BALANCE PROBLEM: ICD-10-CM

## 2024-04-08 DIAGNOSIS — R26.9 GAIT ABNORMALITY: ICD-10-CM

## 2024-04-08 DIAGNOSIS — R68.89 DECREASED FUNCTIONAL ACTIVITY TOLERANCE: Primary | ICD-10-CM

## 2024-04-08 PROCEDURE — 97110 THERAPEUTIC EXERCISES: CPT

## 2024-04-08 PROCEDURE — 97112 NEUROMUSCULAR REEDUCATION: CPT

## 2024-04-08 NOTE — PROGRESS NOTES
[facility]   Physical Therapy Treatment Note        Name: Swati Durán  Clinic Number: [id number]    Therapy Diagnosis:   Encounter Diagnoses   Name Primary?    Decreased functional activity tolerance Yes    Gait abnormality     Balance problem     Decreased strength      Physician: Dayanna Morelos DPM    Visit Date: 4/8/2024    Physician Orders: PT Eval and Treat  Medical Diagnosis from Referral: Sprain of anterior talofibular ligament of left ankle, initial encounter; Pain of joint of left ankle and foot  Evaluation Date: 3/19/2024  Authorization Period Expiration: 12/31/24  Plan of Care Expiration: 5/19/2024  Progress Note Due: 4/19/2024  Visit # / Visits authorized: 3/20 (+1)  FOTO: 1/3 (last performed on 3/19/2024)       Precautions: Standard  PTA Visit #: 0/5     Time In: 11:00 am  Time Out: 11:50 pm  Total Billable Time: 50 minutes      SUBJECTIVE     Pt reports: she is not excited to do blood flow restriction again. Patient states she is surprised how tiring simple motions can be with it.    She was compliant with home exercise program.  Response to previous treatment: no change  Functional change: no change    Pain: NT/10  Location: left ankle pain along medial post tibialis area and lateral calcaneus      OBJECTIVE     Objective Measures updated at progress report unless specified.       TREATMENT     Swati received the treatments listed below:      Patient received Blood Flow Restriction after being screened for contraindications, assessed for precautions, and educated in the benefits/risks associated with the use of blood flow restriction training.  left Lower Extremity at 60-80% occlusion @ proximal thigh -- Cuff Size: blue     8 minutes or less of occlusion for each exercise was performed, with deflations between each exercise for a minimum of 1 minute     Blood flow restriction  Performed  Reps: 30, 15, 15, 15  (75 total) with 30s rest between reps   Sitting  ankle DF/PF x Body Weight   Supine ankle inversion/eversion x Body Weight   MOBU Board  evens x  Body Weight        therapeutic exercises to develop strength, endurance, ROM, flexibility, and core stabilization for 15 minutes including:    Intervention 4/8/2024  Parameters   Upright Bike  x 7 min        Ankle theraband  x  X  X  x 10x/ 2 sets red band; DF  10x/ 2 sets PF  10x/ 2 sets; inversion  10x/ 2 sets eversion                                               manual therapy techniques: Joint mobilizations, Myofacial release, and Soft tissue Mobilization were applied to the: left LE for 0 minutes, including:    Manual Intervention 4/8/2024     Soft Tissue Mobilization - left lateral calf muscle's   Joint Mobilizations - DF; fibular glides superior   Mobilization with movement          Functional Dry Needling           neuromuscular re-education activities to improve: Balance, Coordination, Proprioception, and Posture for 30 minutes. The following activities were included:    Intervention 4/8/2024  Parameters   Sitting ankle DF/PF With BFR    Supine ankle inversion/eversion With BFR    MOBU Board  evens With BFR         Toe yoga x 3 min each   bridge - 10x/ 3 sets               Swati participated in dynamic functional therapeutic activities to improve functional performance for 0 minutes, including:    Intervention 4/8/2024  Parameters                                                                Plan for Next Visit: Progress with strengthening for ankles and LE's        PATIENT EDUCATION AND HOME EXERCISES     Home Exercises Provided and Patient Education Provided     Education provided:   - home exercise program     Written Home Exercises Provided: Patient instructed to cont prior HEP. Exercises were reviewed and Swati was able to demonstrate them prior to the end of the session.  Swati demonstrated good  understanding of the education provided. See EMR under Patient Instructions for exercises provided  during therapy sessions      ASSESSMENT     Blood flow restriction was performed to the left lower extremity today for 24 min. The Limb Occlusion Pressure of the left lower extremity was at 60-80% and each exercise was around 8 minutes of occlusion. Patient tolerated this occlusion pressure very well with only reports of significant muscle fatigue but no pain.      Patient tolerated all treatment well and was able to complete treatment as noted. She reports fatigue with BFR and does feel muscle soreness but no pain post treatment session. Patient performed INVERSION and EVERSION with resitance today without blood flow restriction as well.    Swati Is progressing well towards her goals.   Pt prognosis is Good.     Pt will continue to benefit from skilled outpatient physical therapy to address the deficits listed in the problem list box on initial evaluation, provide pt/family education and to maximize pt's level of independence in the home and community environment.     Pt's spiritual, cultural and educational needs considered and pt agreeable to plan of care and goals.     Anticipated barriers to physical therapy:  co-morbidities, chronicity of condition, adherence to treatment plan, financial limitations, occupation, and coping style     Goals:   Reviewed: 4/8/2024      Short Term Goals: In 4 weeks  Progress Date   1.Patient to be educated on HEP. [x] Progressing  [] MET   [] Not MET   [] Not tested     2.Patient to increase left ankle range of motion, in order to improve available range of motion for ADL's.  [x] Progressing  [] MET   [] Not MET  [] Not tested     3.Patient to increase bilateral LE strength by 1/2 grade, in order to improve endurance and increase ability to perform all functional activities for increased time.  [x] Progressing  [] MET   [] Not MET  [] Not tested     4.Patient to have pain less than 5/10 at worst, to improve QOL. [x] Progressing  [] MET   [] Not MET  [] Not tested     5.Patient to  improve score on the FOTO, to improve QOL. [x] Progressing  [] MET   [] Not MET  [] Not tested     6. Patient to improve score on single leg balance in order to decrease fall risk. [x] Progressing  [] MET   [] Not MET  [] Not tested        Long Term Goals: In 8 weeks Progress Date   1.Patient to improve score on the FOTO predicted score or better, to improve QOL. [x] Progressing  [] MET   [] Not MET  [] Not tested     2. Patient to increase bilateral LE strength to 5/5 or greater, in order to improve endurance and increase ability to perform all functional activities for increased time. [x] Progressing  [] MET   [] Not MET  [] Not tested     3. Patient to have decreased pain to 2/10 at worst, to improve QOL. [x] Progressing  [] MET   [] Not MET  [] Not tested     4. Patient to normalize score on single leg balance, in order to improve endurance and decrease fall risk. [x] Progressing  [] MET   [] Not MET  [] Not tested     5. Patient to perform daily activities including tumbling and dance without increased symptoms. [x] Progressing  [] MET   [] Not MET  [] Not tested          PLAN     Monitor response to today's treatment session and progress with Physical Therapy plan of care as indicated.    Plan of care Certification: 3/19/2024 to 5/19/24.     Outpatient Physical Therapy 2 times weekly for 8 weeks to include any combination of the following interventions: electrical stimulation (PRN), Gait Training, Manual Therapy, Neuromuscular Re-ed, Patient Education/Self Care, Therapeutic Activites, Therapeutic Exercise, and Dry Needling    Tita Reyna, PT

## 2024-04-10 ENCOUNTER — CLINICAL SUPPORT (OUTPATIENT)
Dept: REHABILITATION | Facility: HOSPITAL | Age: 29
End: 2024-04-10
Payer: COMMERCIAL

## 2024-04-10 DIAGNOSIS — R26.9 GAIT ABNORMALITY: ICD-10-CM

## 2024-04-10 DIAGNOSIS — R26.89 BALANCE PROBLEM: ICD-10-CM

## 2024-04-10 DIAGNOSIS — R68.89 DECREASED FUNCTIONAL ACTIVITY TOLERANCE: Primary | ICD-10-CM

## 2024-04-10 DIAGNOSIS — R53.1 DECREASED STRENGTH: ICD-10-CM

## 2024-04-10 PROCEDURE — 97110 THERAPEUTIC EXERCISES: CPT

## 2024-04-10 PROCEDURE — 97140 MANUAL THERAPY 1/> REGIONS: CPT

## 2024-04-10 PROCEDURE — 97112 NEUROMUSCULAR REEDUCATION: CPT

## 2024-04-10 NOTE — PROGRESS NOTES
[facility]   Physical Therapy Treatment Note        Name: Swati Durán  Clinic Number: [id number]    Therapy Diagnosis:   Encounter Diagnoses   Name Primary?    Decreased functional activity tolerance Yes    Gait abnormality     Balance problem     Decreased strength      Physician: Dayanna Morelos DPM    Visit Date: 4/10/2024    Physician Orders: PT Eval and Treat  Medical Diagnosis from Referral: Sprain of anterior talofibular ligament of left ankle, initial encounter; Pain of joint of left ankle and foot  Evaluation Date: 3/19/2024  Authorization Period Expiration: 12/31/24  Plan of Care Expiration: 5/19/2024  Progress Note Due: 4/19/2024  Visit # / Visits authorized: 4/20 (+1)  FOTO: 1/3 (last performed on 3/19/2024)       Precautions: Standard  PTA Visit #: 0/5     Time In: 4:00 pm  Time Out: 5:00 pm  Total Billable Time: 55 minutes      SUBJECTIVE     Pt reports: Patient agreeable to do BFR, her calf is sore but better after manual. She continues to feel fatigue at end of treatment sessions.    She was compliant with home exercise program.  Response to previous treatment: no change  Functional change: no change    Pain: NT/10  Location: left ankle pain along medial post tibialis area and lateral calcaneus      OBJECTIVE     Objective Measures updated at progress report unless specified.     TREATMENT     Swati received the treatments listed below:      Patient received Blood Flow Restriction after being screened for contraindications, assessed for precautions, and educated in the benefits/risks associated with the use of blood flow restriction training.  left Lower Extremity at 60-80% occlusion @ proximal thigh -- Cuff Size: blue     8 minutes or less of occlusion for each exercise was performed, with deflations between each exercise for a minimum of 1 minute     Blood flow restriction  Performed  Reps: 30, 15, 15, 15  (75 total) with 30s rest between reps   Sitting  ankle DF/PF x Body Weight   Supine ankle inversion/eversion x Body Weight   MOBU Board  evens x  Body Weight        therapeutic exercises to develop strength, endurance, ROM, flexibility, and core stabilization for 15 minutes including:    Intervention 4/10/2024  Parameters   Upright Bike  x 7 min        Ankle theraband  x  x  x  x 10x/ 2 sets red band; DF  10x/ 2 sets PF  10x/ 2 sets; inversion  10x/ 2 sets eversion                                               manual therapy techniques: Joint mobilizations, Myofacial release, and Soft tissue Mobilization were applied to the: left LE for 8 minutes, including:    Manual Intervention 4/8/2024     Soft Tissue Mobilization x left calf muscle's (pre and post BFR)   Joint Mobilizations - DF; fibular glides superior   Mobilization with movement          Functional Dry Needling           neuromuscular re-education activities to improve: Balance, Coordination, Proprioception, and Posture for 30 minutes. The following activities were included:    Intervention 4/10/2024  Parameters   Sitting ankle DF/PF With BFR    Supine ankle inversion/eversion With BFR    MOBU Board  evens With BFR         Toe yoga x 10x/ 2 sets each (standing)   bridge - 10x/ 3 sets               Swati participated in dynamic functional therapeutic activities to improve functional performance for 0 minutes, including:    Intervention 4/10/2024  Parameters                                                                Plan for Next Visit: Progress with strengthening for ankles and LE's        PATIENT EDUCATION AND HOME EXERCISES     Home Exercises Provided and Patient Education Provided     Education provided:   - home exercise program     Written Home Exercises Provided: Patient instructed to cont prior HEP. Exercises were reviewed and Swati was able to demonstrate them prior to the end of the session.  Swati demonstrated good  understanding of the education provided. See EMR under Patient  Instructions for exercises provided during therapy sessions      ASSESSMENT     Blood flow restriction was performed to the left lower extremity today for 24 min. The Limb Occlusion Pressure of the left lower extremity was at 60-80% and each exercise was around 8 minutes of occlusion. Patient tolerated this occlusion pressure very well with only reports of significant muscle fatigue but no pain.      Patient tolerated all treatment well and was able to progress to standing toe yoga today. Encouraged home exercise program and discussed progressions over next several weeks.     Swati Is progressing well towards her goals.   Pt prognosis is Good.     Pt will continue to benefit from skilled outpatient physical therapy to address the deficits listed in the problem list box on initial evaluation, provide pt/family education and to maximize pt's level of independence in the home and community environment.     Pt's spiritual, cultural and educational needs considered and pt agreeable to plan of care and goals.     Anticipated barriers to physical therapy:  co-morbidities, chronicity of condition, adherence to treatment plan, financial limitations, occupation, and coping style     Goals:   Reviewed: 4/10/2024       Short Term Goals: In 4 weeks  Progress Date   1.Patient to be educated on HEP. [x] Progressing  [] MET   [] Not MET   [] Not tested     2.Patient to increase left ankle range of motion, in order to improve available range of motion for ADL's.  [x] Progressing  [] MET   [] Not MET  [] Not tested     3.Patient to increase bilateral LE strength by 1/2 grade, in order to improve endurance and increase ability to perform all functional activities for increased time.  [x] Progressing  [] MET   [] Not MET  [] Not tested     4.Patient to have pain less than 5/10 at worst, to improve QOL. [x] Progressing  [] MET   [] Not MET  [] Not tested     5.Patient to improve score on the FOTO, to improve QOL. [x] Progressing  []  MET   [] Not MET  [] Not tested     6. Patient to improve score on single leg balance in order to decrease fall risk. [x] Progressing  [] MET   [] Not MET  [] Not tested        Long Term Goals: In 8 weeks Progress Date   1.Patient to improve score on the FOTO predicted score or better, to improve QOL. [x] Progressing  [] MET   [] Not MET  [] Not tested     2. Patient to increase bilateral LE strength to 5/5 or greater, in order to improve endurance and increase ability to perform all functional activities for increased time. [x] Progressing  [] MET   [] Not MET  [] Not tested     3. Patient to have decreased pain to 2/10 at worst, to improve QOL. [x] Progressing  [] MET   [] Not MET  [] Not tested     4. Patient to normalize score on single leg balance, in order to improve endurance and decrease fall risk. [x] Progressing  [] MET   [] Not MET  [] Not tested     5. Patient to perform daily activities including tumbling and dance without increased symptoms. [x] Progressing  [] MET   [] Not MET  [] Not tested          PLAN     Monitor response to today's treatment session and progress with Physical Therapy plan of care as indicated.    Plan of care Certification: 3/19/2024 to 5/19/24.     Outpatient Physical Therapy 2 times weekly for 8 weeks to include any combination of the following interventions: electrical stimulation (PRN), Gait Training, Manual Therapy, Neuromuscular Re-ed, Patient Education/Self Care, Therapeutic Activites, Therapeutic Exercise, and Dry Needling    Ricarda Quintero PT

## 2024-04-15 ENCOUNTER — CLINICAL SUPPORT (OUTPATIENT)
Dept: REHABILITATION | Facility: HOSPITAL | Age: 29
End: 2024-04-15
Payer: COMMERCIAL

## 2024-04-15 DIAGNOSIS — R26.9 GAIT ABNORMALITY: ICD-10-CM

## 2024-04-15 DIAGNOSIS — R26.89 BALANCE PROBLEM: ICD-10-CM

## 2024-04-15 DIAGNOSIS — R68.89 DECREASED FUNCTIONAL ACTIVITY TOLERANCE: Primary | ICD-10-CM

## 2024-04-15 DIAGNOSIS — R53.1 DECREASED STRENGTH: ICD-10-CM

## 2024-04-15 PROCEDURE — 97110 THERAPEUTIC EXERCISES: CPT

## 2024-04-15 PROCEDURE — 97140 MANUAL THERAPY 1/> REGIONS: CPT

## 2024-04-15 PROCEDURE — 97112 NEUROMUSCULAR REEDUCATION: CPT

## 2024-04-15 NOTE — PROGRESS NOTES
[facility]   Physical Therapy Treatment Note        Name: Swati Durán  Clinic Number: [id number]    Therapy Diagnosis:   Encounter Diagnoses   Name Primary?    Decreased functional activity tolerance Yes    Gait abnormality     Balance problem     Decreased strength      Physician: Dayanna Morelos DPM    Visit Date: 4/15/2024    Physician Orders: PT Eval and Treat  Medical Diagnosis from Referral: Sprain of anterior talofibular ligament of left ankle, initial encounter; Pain of joint of left ankle and foot  Evaluation Date: 3/19/2024  Authorization Period Expiration: 12/31/24  Plan of Care Expiration: 5/19/2024  Progress Note Due: 4/19/2024  Visit # / Visits authorized: 5/20 (+1)  FOTO: 1/3 (last performed on 3/19/2024)       Precautions: Standard  PTA Visit #: 0/5     Time In: 3:00 pm  Time Out: 4:00 pm  Total Billable Time: 53 minutes      SUBJECTIVE     Pt reports: Patient agreeable to do BFR, she is not having any pain today and has been feeling better.    She was compliant with home exercise program.  Response to previous treatment: no change  Functional change: no change    Pain: 0/10  Location: left ankle pain along medial post tibialis area and lateral calcaneus      OBJECTIVE     Objective Measures updated at progress report unless specified.     TREATMENT     Swati received the treatments listed below:      Patient received Blood Flow Restriction after being screened for contraindications, assessed for precautions, and educated in the benefits/risks associated with the use of blood flow restriction training.  left Lower Extremity at 60-80% occlusion @ proximal thigh -- Cuff Size: blue     8 minutes or less of occlusion for each exercise was performed, with deflations between each exercise for a minimum of 1 minute     Blood flow restriction  Performed  Reps: 30, 15, 15, 15  (75 total) with 30s rest between reps   Sitting ankle DF/PF x Body Weight   Supine ankle  inversion/eversion x Body Weight   MOBU Board  evens x  Body Weight STAND        therapeutic exercises to develop strength, endurance, ROM, flexibility, and core stabilization for 17 minutes including:    Intervention 4/15/2024  Parameters   Upright Bike  x 5 min        Ankle theraband  x  x  x  x 10x/ 3 sets red band; DF  10x/ 3 sets PF  10x/ 3 sets; inversion  10x/ 3 sets eversion                                               manual therapy techniques: Joint mobilizations, Myofacial release, and Soft tissue Mobilization were applied to the: left LE for 8 minutes, including:    Manual Intervention 4/15/2024     Soft Tissue Mobilization x left calf muscle's    Joint Mobilizations - DF; fibular glides superior   Mobilization with movement          Functional Dry Needling           neuromuscular re-education activities to improve: Balance, Coordination, Proprioception, and Posture for 30 minutes. The following activities were included:    Intervention 4/15/2024  Parameters   Sitting ankle DF/PF With BFR    Supine ankle inversion/eversion With BFR    MOBU Board  evens STAND With BFR         Toe yoga x 10x/ 2 sets each (standing)   bridge - 10x/ 3 sets               Swati participated in dynamic functional therapeutic activities to improve functional performance for 0 minutes, including:    Intervention 4/15/2024  Parameters                                                                Plan for Next Visit: Progress with strengthening for ankles and LE's        PATIENT EDUCATION AND HOME EXERCISES     Home Exercises Provided and Patient Education Provided     Education provided:   - home exercise program     Written Home Exercises Provided: Patient instructed to cont prior HEP. Exercises were reviewed and Swati was able to demonstrate them prior to the end of the session.  Swati demonstrated good  understanding of the education provided. See EMR under Patient Instructions for exercises provided during therapy  sessions      ASSESSMENT     Blood flow restriction was performed to the left lower extremity today for 24 min. The Limb Occlusion Pressure of the left lower extremity was at 60-80% and each exercise was around 8 minutes of occlusion. Patient tolerated this occlusion pressure very well with only reports of significant muscle fatigue but no pain.      Patient tolerated all treatment well and was able to progress as noted. Cues and encouragement throughout treatment as needed. Encouraged home exercise program and balance activities.     Swati Is progressing well towards her goals.   Pt prognosis is Good.     Pt will continue to benefit from skilled outpatient physical therapy to address the deficits listed in the problem list box on initial evaluation, provide pt/family education and to maximize pt's level of independence in the home and community environment.     Pt's spiritual, cultural and educational needs considered and pt agreeable to plan of care and goals.     Anticipated barriers to physical therapy:  co-morbidities, chronicity of condition, adherence to treatment plan, financial limitations, occupation, and coping style     Goals:   Reviewed: 4/15/2024       Short Term Goals: In 4 weeks  Progress Date   1.Patient to be educated on HEP. [x] Progressing  [] MET   [] Not MET   [] Not tested     2.Patient to increase left ankle range of motion, in order to improve available range of motion for ADL's.  [x] Progressing  [] MET   [] Not MET  [] Not tested     3.Patient to increase bilateral LE strength by 1/2 grade, in order to improve endurance and increase ability to perform all functional activities for increased time.  [x] Progressing  [] MET   [] Not MET  [] Not tested     4.Patient to have pain less than 5/10 at worst, to improve QOL. [x] Progressing  [] MET   [] Not MET  [] Not tested     5.Patient to improve score on the FOTO, to improve QOL. [x] Progressing  [] MET   [] Not MET  [] Not tested     6.  Patient to improve score on single leg balance in order to decrease fall risk. [x] Progressing  [] MET   [] Not MET  [] Not tested        Long Term Goals: In 8 weeks Progress Date   1.Patient to improve score on the FOTO predicted score or better, to improve QOL. [x] Progressing  [] MET   [] Not MET  [] Not tested     2. Patient to increase bilateral LE strength to 5/5 or greater, in order to improve endurance and increase ability to perform all functional activities for increased time. [x] Progressing  [] MET   [] Not MET  [] Not tested     3. Patient to have decreased pain to 2/10 at worst, to improve QOL. [x] Progressing  [] MET   [] Not MET  [] Not tested     4. Patient to normalize score on single leg balance, in order to improve endurance and decrease fall risk. [x] Progressing  [] MET   [] Not MET  [] Not tested     5. Patient to perform daily activities including tumbling and dance without increased symptoms. [x] Progressing  [] MET   [] Not MET  [] Not tested          PLAN     Monitor response to today's treatment session and progress with Physical Therapy plan of care as indicated.    Plan of care Certification: 3/19/2024 to 5/19/24.     Outpatient Physical Therapy 2 times weekly for 8 weeks to include any combination of the following interventions: electrical stimulation (PRN), Gait Training, Manual Therapy, Neuromuscular Re-ed, Patient Education/Self Care, Therapeutic Activites, Therapeutic Exercise, and Dry Needling    Ricarda Quintero PT

## 2024-04-17 ENCOUNTER — CLINICAL SUPPORT (OUTPATIENT)
Dept: REHABILITATION | Facility: HOSPITAL | Age: 29
End: 2024-04-17
Payer: COMMERCIAL

## 2024-04-17 DIAGNOSIS — R68.89 DECREASED FUNCTIONAL ACTIVITY TOLERANCE: Primary | ICD-10-CM

## 2024-04-17 DIAGNOSIS — R53.1 DECREASED STRENGTH: ICD-10-CM

## 2024-04-17 DIAGNOSIS — R26.89 BALANCE PROBLEM: ICD-10-CM

## 2024-04-17 DIAGNOSIS — R26.9 GAIT ABNORMALITY: ICD-10-CM

## 2024-04-17 PROCEDURE — 97110 THERAPEUTIC EXERCISES: CPT

## 2024-04-17 PROCEDURE — 97112 NEUROMUSCULAR REEDUCATION: CPT

## 2024-04-17 PROCEDURE — 97140 MANUAL THERAPY 1/> REGIONS: CPT

## 2024-04-17 NOTE — PROGRESS NOTES
[facility]   Physical Therapy Treatment Note        Name: Swati Durán  Clinic Number: [id number]    Therapy Diagnosis:   Encounter Diagnoses   Name Primary?    Decreased functional activity tolerance Yes    Gait abnormality     Balance problem     Decreased strength      Physician: Dayanna Morelos DPM    Visit Date: 4/17/2024    Physician Orders: PT Eval and Treat  Medical Diagnosis from Referral: Sprain of anterior talofibular ligament of left ankle, initial encounter; Pain of joint of left ankle and foot  Evaluation Date: 3/19/2024  Authorization Period Expiration: 12/31/24  Plan of Care Expiration: 5/19/2024  Progress Note Due: 4/19/2024  Visit # / Visits authorized: 6/20 (+1)  FOTO: 1/3 (last performed on 3/19/2024)       Precautions: Standard  PTA Visit #: 0/5     Time In: 3:00 pm  Time Out: 4:00 pm  Total Billable Time: 52 minutes      SUBJECTIVE     Pt reports: Patient agreeable to do BFR, she is very sore from earlier this week. Better after manual treatment but more sore this treatment session.      She was compliant with home exercise program.  Response to previous treatment: no change  Functional change: no change    Pain: 0/10  Location: left ankle pain along medial post tibialis area and lateral calcaneus      OBJECTIVE     Objective Measures updated at progress report unless specified.     TREATMENT     Swati received the treatments listed below:      Patient received Blood Flow Restriction after being screened for contraindications, assessed for precautions, and educated in the benefits/risks associated with the use of blood flow restriction training.  left Lower Extremity at 60-80% occlusion @ proximal thigh -- Cuff Size: blue     8 minutes or less of occlusion for each exercise was performed, with deflations between each exercise for a minimum of 1 minute     Blood flow restriction  Performed  Reps: 30, 15, 15, 15  (75 total) with 30s rest between reps    Sitting ankle DF/PF x Body Weight   Supine ankle inversion/eversion x Body Weight   MOBU Board  evens x  Body Weight STAND        therapeutic exercises to develop strength, endurance, ROM, flexibility, and core stabilization for 10 minutes including:    Intervention 4/17/2024  Parameters   Upright Bike  x 7 min        Ankle theraband  -  -  -  - 10x/ 3 sets red band; DF  10x/ 3 sets PF  10x/ 3 sets; inversion  10x/ 3 sets eversion        bridge x 10x/ 2 sets                                     manual therapy techniques: Joint mobilizations, Myofacial release, and Soft tissue Mobilization were applied to the: left LE for 12 minutes, including:    Manual Intervention 4/17/2024     Soft Tissue Mobilization x left calf muscle's    Joint Mobilizations - DF; fibular glides superior   Mobilization with movement          Functional Dry Needling           neuromuscular re-education activities to improve: Balance, Coordination, Proprioception, and Posture for 30 minutes. The following activities were included:    Intervention 4/17/2024  Parameters   Sitting ankle DF/PF With BFR    Supine ankle inversion/eversion With BFR    MOBU Board  evens STAND With BFR         Toe yoga x 3 min each (standing)                    Swati participated in dynamic functional therapeutic activities to improve functional performance for 0 minutes, including:    Intervention 4/17/2024  Parameters                                                                Plan for Next Visit: move to 1 visit BFR, 1 visit standing activities - sidestepping band at feet, sitting hip flexion with band at ankles, heel raises on leg press, SL balance, dynamic balance activities with SLB, BOSU/airex squats, hip thrusters, sidelying hip series.        PATIENT EDUCATION AND HOME EXERCISES     Home Exercises Provided and Patient Education Provided     Education provided:   - home exercise program     Written Home Exercises Provided: Patient instructed to cont prior  HEP. Exercises were reviewed and Swati was able to demonstrate them prior to the end of the session.  Swati demonstrated good  understanding of the education provided. See EMR under Patient Instructions for exercises provided during therapy sessions      ASSESSMENT     Blood flow restriction was performed to the left lower extremity today for 24 min. The Limb Occlusion Pressure of the left lower extremity was at 60-80% and each exercise was around 8 minutes of occlusion. Patient tolerated this occlusion pressure very well with only reports of significant muscle fatigue but no pain.      Patient with decreased tolerance to BFR today due to fatigue. She reports improved overall symptoms after manual at end of treatment session. Encouraged home exercise program.    Swati Is progressing well towards her goals.   Pt prognosis is Good.     Pt will continue to benefit from skilled outpatient physical therapy to address the deficits listed in the problem list box on initial evaluation, provide pt/family education and to maximize pt's level of independence in the home and community environment.     Pt's spiritual, cultural and educational needs considered and pt agreeable to plan of care and goals.     Anticipated barriers to physical therapy:  co-morbidities, chronicity of condition, adherence to treatment plan, financial limitations, occupation, and coping style     Goals:   Reviewed: 4/17/2024       Short Term Goals: In 4 weeks  Progress Date   1.Patient to be educated on HEP. [x] Progressing  [] MET   [] Not MET   [] Not tested     2.Patient to increase left ankle range of motion, in order to improve available range of motion for ADL's.  [x] Progressing  [] MET   [] Not MET  [] Not tested     3.Patient to increase bilateral LE strength by 1/2 grade, in order to improve endurance and increase ability to perform all functional activities for increased time.  [x] Progressing  [] MET   [] Not MET  [] Not tested      4.Patient to have pain less than 5/10 at worst, to improve QOL. [x] Progressing  [] MET   [] Not MET  [] Not tested     5.Patient to improve score on the FOTO, to improve QOL. [x] Progressing  [] MET   [] Not MET  [] Not tested     6. Patient to improve score on single leg balance in order to decrease fall risk. [x] Progressing  [] MET   [] Not MET  [] Not tested        Long Term Goals: In 8 weeks Progress Date   1.Patient to improve score on the FOTO predicted score or better, to improve QOL. [x] Progressing  [] MET   [] Not MET  [] Not tested     2. Patient to increase bilateral LE strength to 5/5 or greater, in order to improve endurance and increase ability to perform all functional activities for increased time. [x] Progressing  [] MET   [] Not MET  [] Not tested     3. Patient to have decreased pain to 2/10 at worst, to improve QOL. [x] Progressing  [] MET   [] Not MET  [] Not tested     4. Patient to normalize score on single leg balance, in order to improve endurance and decrease fall risk. [x] Progressing  [] MET   [] Not MET  [] Not tested     5. Patient to perform daily activities including tumbling and dance without increased symptoms. [x] Progressing  [] MET   [] Not MET  [] Not tested          PLAN     Monitor response to today's treatment session and progress with Physical Therapy plan of care as indicated.    Plan of care Certification: 3/19/2024 to 5/19/24.     Outpatient Physical Therapy 2 times weekly for 8 weeks to include any combination of the following interventions: electrical stimulation (PRN), Gait Training, Manual Therapy, Neuromuscular Re-ed, Patient Education/Self Care, Therapeutic Activites, Therapeutic Exercise, and Dry Needling    Ricarda Quintero, PT

## 2024-04-19 ENCOUNTER — PATIENT MESSAGE (OUTPATIENT)
Dept: RESEARCH | Facility: HOSPITAL | Age: 29
End: 2024-04-19
Payer: COMMERCIAL

## 2024-04-22 ENCOUNTER — CLINICAL SUPPORT (OUTPATIENT)
Dept: REHABILITATION | Facility: HOSPITAL | Age: 29
End: 2024-04-22
Payer: COMMERCIAL

## 2024-04-22 DIAGNOSIS — R26.89 BALANCE PROBLEM: ICD-10-CM

## 2024-04-22 DIAGNOSIS — R68.89 DECREASED FUNCTIONAL ACTIVITY TOLERANCE: Primary | ICD-10-CM

## 2024-04-22 DIAGNOSIS — R53.1 DECREASED STRENGTH: ICD-10-CM

## 2024-04-22 DIAGNOSIS — R26.9 GAIT ABNORMALITY: ICD-10-CM

## 2024-04-22 PROCEDURE — 97140 MANUAL THERAPY 1/> REGIONS: CPT

## 2024-04-22 PROCEDURE — 97112 NEUROMUSCULAR REEDUCATION: CPT

## 2024-04-22 PROCEDURE — 97110 THERAPEUTIC EXERCISES: CPT

## 2024-04-22 NOTE — PROGRESS NOTES
[facility]   Physical Therapy Treatment Note        Name: Swati Durán  Clinic Number: [id number]    Therapy Diagnosis:   No diagnosis found.    Physician: Dayanna Morelos DPM    Visit Date: 4/22/2024    Physician Orders: PT Eval and Treat  Medical Diagnosis from Referral: Sprain of anterior talofibular ligament of left ankle, initial encounter; Pain of joint of left ankle and foot  Evaluation Date: 3/19/2024  Authorization Period Expiration: 12/31/24  Plan of Care Expiration: 5/19/2024  Progress Note Due: 4/19/2024  Visit # / Visits authorized: 7/20 (+1)  FOTO: 1/3 (last performed on 3/19/2024)       Precautions: Standard  PTA Visit #: 0/5     Time In: 3:00 pm  Time Out: 4:02 pm  Total Billable Time: 55 minutes      SUBJECTIVE     Pt reports: she is a little sore today after demonstrating tumbling over the weekend. Feels that BFR has been helping overall pain and strength     She was compliant with home exercise program.  Response to previous treatment: no change  Functional change: no change    Pain: 0/10  Location: left ankle pain along medial post tibialis area and lateral calcaneus      OBJECTIVE     Objective Measures updated at progress report unless specified.     TREATMENT     Swati received the treatments listed below:      Patient received Blood Flow Restriction after being screened for contraindications, assessed for precautions, and educated in the benefits/risks associated with the use of blood flow restriction training.  left Lower Extremity at 60-80% occlusion @ proximal thigh -- Cuff Size: blue     8 minutes or less of occlusion for each exercise was performed, with deflations between each exercise for a minimum of 2 minutes     Blood flow restriction  Performed  Reps: 30, 15, 15, 15  (75 total) with 30s rest between reps   Sitting ankle DF/PF x Body Weight   Supine ankle inversion/eversion x Body Weight   MOBU Board  evens x  Body Weight STAND         therapeutic exercises to develop strength, endurance, ROM, flexibility, and core stabilization for 10 minutes including:    Intervention 4/22/2024  Parameters   Upright Bike  x 7 min        Ankle theraband  -  -  -  - 10x/ 3 sets red band; DF  10x/ 3 sets PF  10x/ 3 sets; inversion  10x/ 3 sets eversion        bridge x 10x/ 2 sets                                     manual therapy techniques: Joint mobilizations, Myofacial release, and Soft tissue Mobilization were applied to the: left LE for 12 minutes, including:    Manual Intervention 4/22/2024     Soft Tissue Mobilization x left calf muscle's    Joint Mobilizations - DF; fibular glides superior   Mobilization with movement          Functional Dry Needling           neuromuscular re-education activities to improve: Balance, Coordination, Proprioception, and Posture for 33 minutes. The following activities were included:    Intervention 4/22/2024  Parameters   Sitting ankle DF/PF STAND With BFR    Supine ankle inversion/eversion With BFR    MOBO Board  evens STAND With BFR         Toe yoga x 3 min each (standing)                    Swati participated in dynamic functional therapeutic activities to improve functional performance for 0 minutes, including:    Intervention 4/22/2024  Parameters                                                                Plan for Next Visit: move to 1 visit BFR, 1 visit standing activities - sidestepping band at feet, sitting hip flexion with band at ankles, heel raises on leg press, SL balance, dynamic balance activities with SLB, BOSU/airex squats, hip thrusters, sidelying hip series.        PATIENT EDUCATION AND HOME EXERCISES     Home Exercises Provided and Patient Education Provided     Education provided:   - home exercise program     Written Home Exercises Provided: Patient instructed to cont prior HEP. Exercises were reviewed and Swati was able to demonstrate them prior to the end of the session.  Swati  demonstrated good  understanding of the education provided. See EMR under Patient Instructions for exercises provided during therapy sessions      ASSESSMENT     Pt tolerated session well today. Blood flow restriction incorporated today after being cleared for all contradictions. Intervention performed in order to improve strength gains with lower load interventions. Pt tolerated intervention well with only reports of significant muscle fatigue but no pain throughout. Significant increased muscle tension and tenderness to palpation noted at posterior chain of lower leg with manual interventions today. Pt notes she is very fatigued following BFR but feels good overall with no significant pain     Patient with decreased tolerance to BFR today due to fatigue. She reports improved overall symptoms after manual at end of treatment session. Encouraged home exercise program.    Swati Is progressing well towards her goals.   Pt prognosis is Good.     Pt will continue to benefit from skilled outpatient physical therapy to address the deficits listed in the problem list box on initial evaluation, provide pt/family education and to maximize pt's level of independence in the home and community environment.     Pt's spiritual, cultural and educational needs considered and pt agreeable to plan of care and goals.     Anticipated barriers to physical therapy:  co-morbidities, chronicity of condition, adherence to treatment plan, financial limitations, occupation, and coping style     Goals:   Reviewed: 4/22/2024       Short Term Goals: In 4 weeks  Progress Date   1.Patient to be educated on HEP. [x] Progressing  [] MET   [] Not MET   [] Not tested     2.Patient to increase left ankle range of motion, in order to improve available range of motion for ADL's.  [x] Progressing  [] MET   [] Not MET  [] Not tested     3.Patient to increase bilateral LE strength by 1/2 grade, in order to improve endurance and increase ability to perform  all functional activities for increased time.  [x] Progressing  [] MET   [] Not MET  [] Not tested     4.Patient to have pain less than 5/10 at worst, to improve QOL. [x] Progressing  [] MET   [] Not MET  [] Not tested     5.Patient to improve score on the FOTO, to improve QOL. [x] Progressing  [] MET   [] Not MET  [] Not tested     6. Patient to improve score on single leg balance in order to decrease fall risk. [x] Progressing  [] MET   [] Not MET  [] Not tested        Long Term Goals: In 8 weeks Progress Date   1.Patient to improve score on the FOTO predicted score or better, to improve QOL. [x] Progressing  [] MET   [] Not MET  [] Not tested     2. Patient to increase bilateral LE strength to 5/5 or greater, in order to improve endurance and increase ability to perform all functional activities for increased time. [x] Progressing  [] MET   [] Not MET  [] Not tested     3. Patient to have decreased pain to 2/10 at worst, to improve QOL. [x] Progressing  [] MET   [] Not MET  [] Not tested     4. Patient to normalize score on single leg balance, in order to improve endurance and decrease fall risk. [x] Progressing  [] MET   [] Not MET  [] Not tested     5. Patient to perform daily activities including tumbling and dance without increased symptoms. [x] Progressing  [] MET   [] Not MET  [] Not tested          PLAN     Monitor response to today's treatment session and progress with Physical Therapy plan of care as indicated.    Plan of care Certification: 3/19/2024 to 5/19/24.     Outpatient Physical Therapy 2 times weekly for 8 weeks to include any combination of the following interventions: electrical stimulation (PRN), Gait Training, Manual Therapy, Neuromuscular Re-ed, Patient Education/Self Care, Therapeutic Activites, Therapeutic Exercise, and Dry Needling    Tara Maki, PT

## 2024-04-24 ENCOUNTER — CLINICAL SUPPORT (OUTPATIENT)
Dept: REHABILITATION | Facility: HOSPITAL | Age: 29
End: 2024-04-24
Payer: COMMERCIAL

## 2024-04-24 DIAGNOSIS — R53.1 DECREASED STRENGTH: ICD-10-CM

## 2024-04-24 DIAGNOSIS — R26.89 BALANCE PROBLEM: ICD-10-CM

## 2024-04-24 DIAGNOSIS — R26.9 GAIT ABNORMALITY: ICD-10-CM

## 2024-04-24 DIAGNOSIS — R68.89 DECREASED FUNCTIONAL ACTIVITY TOLERANCE: Primary | ICD-10-CM

## 2024-04-24 PROCEDURE — 97112 NEUROMUSCULAR REEDUCATION: CPT

## 2024-04-24 PROCEDURE — 97110 THERAPEUTIC EXERCISES: CPT

## 2024-04-24 PROCEDURE — 97530 THERAPEUTIC ACTIVITIES: CPT

## 2024-04-24 NOTE — PROGRESS NOTES
[facility]   Physical Therapy Treatment Note        Name: Swait Durán  Clinic Number: [id number]    Therapy Diagnosis:   Encounter Diagnoses   Name Primary?    Decreased functional activity tolerance Yes    Gait abnormality     Balance problem     Decreased strength        Physician: Dayanna Morelos DPM    Visit Date: 4/24/2024    Physician Orders: PT Eval and Treat  Medical Diagnosis from Referral: Sprain of anterior talofibular ligament of left ankle, initial encounter; Pain of joint of left ankle and foot  Evaluation Date: 3/19/2024  Authorization Period Expiration: 12/31/24  Plan of Care Expiration: 5/19/2024  Progress Note Due: 4/19/2024  Visit # / Visits authorized: 8/20 (+1)  FOTO: 1/3 (last performed on 3/19/2024)       Precautions: Standard  PTA Visit #: 0/5     Time In: 4:00 pm  Time Out: 5:00 pm  Total Billable Time: 51 minutes      SUBJECTIVE     Pt reports: she is still a little sore from some demonstration of dance and jump (landed on right foot/ankle). Fatigue in hips and ankle at end of treatment session.    She was compliant with home exercise program.  Response to previous treatment: no change  Functional change: no change    Pain: 0/10  Location: left ankle pain along medial post tibialis area and lateral calcaneus      OBJECTIVE     Objective Measures updated at progress report unless specified.     TREATMENT     Swati received the treatments listed below:      Patient received NO Blood Flow Restriction after being screened for contraindications, assessed for precautions, and educated in the benefits/risks associated with the use of blood flow restriction training.  left Lower Extremity at 60-80% occlusion @ proximal thigh -- Cuff Size: blue     8 minutes or less of occlusion for each exercise was performed, with deflations between each exercise for a minimum of 2 minutes     Blood flow restriction  Performed  Reps: 30, 15, 15, 15  (75 total) with 30s  rest between reps   Sitting ankle DF/PF  Body Weight   Supine ankle inversion/eversion  Body Weight   MOBU Board  evens  Body Weight STAND        therapeutic exercises to develop strength, endurance, ROM, flexibility, and core stabilization for 11 minutes including:    Intervention 4/24/2024  Parameters   Upright Bike  x 8 min        Ankle theraband  -  -  -  - 10x/ 3 sets red band; DF  10x/ 3 sets PF  10x/ 3 sets; inversion  10x/ 3 sets eversion        bridge x 10x/ 3 sets                                     manual therapy techniques: Joint mobilizations, Myofacial release, and Soft tissue Mobilization were applied to the: left LE for 5 minutes, including:    Manual Intervention 4/24/2024     Soft Tissue Mobilization x left calf muscle's    Joint Mobilizations x DF; fibular glides superior   Mobilization with movement          Functional Dry Needling           neuromuscular re-education activities to improve: Balance, Coordination, Proprioception, and Posture for 25 minutes. The following activities were included:    Intervention 4/24/2024  Parameters   Sitting ankle DF/PF STAND     Supine ankle inversion/eversion     MOBO Board  evens STAND          Toe yoga x 3 min each (standing)   Sidelying hip  x  x  x Abduction 10x/ 3 sets  Clamshells 10x/ 3 sets  Reverse clamshells 10x/ 3 sets   sitting hip flexion with band at feet x 10x/ 3 sets          Swati participated in dynamic functional therapeutic activities to improve functional performance for 10 minutes, including:    Intervention 4/24/2024  Parameters   Sidelying dynamic balance x 10x yellow band shoulder extension with SLB all 4 directions   BOSU/airex squats x 10x/ 2 sets   sidestepping band at feet x Pink ankle strap 10x/ 2 sets each way    heel raises on leg press                                              Plan for Next Visit: move to 1 visit BFR, 1 visit standing activities - SL balance, dynamic balance activities with SLB, , hip thrusters, sidelying  hip series.        PATIENT EDUCATION AND HOME EXERCISES     Home Exercises Provided and Patient Education Provided     Education provided:   - home exercise program     Written Home Exercises Provided: Patient instructed to cont prior HEP. Exercises were reviewed and Swati was able to demonstrate them prior to the end of the session.  Swati demonstrated good  understanding of the education provided. See EMR under Patient Instructions for exercises provided during therapy sessions      ASSESSMENT     Patient tolerated progression of treatment today and was able to complete all interventions with only complaint fatigue. Encouraged home exercise program and continued activity as tolerated. Cues as needed during treatment session for technique and avoidance of substitution patterns.    Swati Is progressing well towards her goals.   Pt prognosis is Good.     Pt will continue to benefit from skilled outpatient physical therapy to address the deficits listed in the problem list box on initial evaluation, provide pt/family education and to maximize pt's level of independence in the home and community environment.     Pt's spiritual, cultural and educational needs considered and pt agreeable to plan of care and goals.     Anticipated barriers to physical therapy:  co-morbidities, chronicity of condition, adherence to treatment plan, financial limitations, occupation, and coping style     Goals:   Reviewed: 4/24/2024       Short Term Goals: In 4 weeks  Progress Date   1.Patient to be educated on HEP. [x] Progressing  [] MET   [] Not MET   [] Not tested     2.Patient to increase left ankle range of motion, in order to improve available range of motion for ADL's.  [x] Progressing  [] MET   [] Not MET  [] Not tested     3.Patient to increase bilateral LE strength by 1/2 grade, in order to improve endurance and increase ability to perform all functional activities for increased time.  [x] Progressing  [] MET   [] Not  MET  [] Not tested     4.Patient to have pain less than 5/10 at worst, to improve QOL. [x] Progressing  [] MET   [] Not MET  [] Not tested     5.Patient to improve score on the FOTO, to improve QOL. [x] Progressing  [] MET   [] Not MET  [] Not tested     6. Patient to improve score on single leg balance in order to decrease fall risk. [x] Progressing  [] MET   [] Not MET  [] Not tested        Long Term Goals: In 8 weeks Progress Date   1.Patient to improve score on the FOTO predicted score or better, to improve QOL. [x] Progressing  [] MET   [] Not MET  [] Not tested     2. Patient to increase bilateral LE strength to 5/5 or greater, in order to improve endurance and increase ability to perform all functional activities for increased time. [x] Progressing  [] MET   [] Not MET  [] Not tested     3. Patient to have decreased pain to 2/10 at worst, to improve QOL. [x] Progressing  [] MET   [] Not MET  [] Not tested     4. Patient to normalize score on single leg balance, in order to improve endurance and decrease fall risk. [x] Progressing  [] MET   [] Not MET  [] Not tested     5. Patient to perform daily activities including tumbling and dance without increased symptoms. [x] Progressing  [] MET   [] Not MET  [] Not tested          PLAN     Monitor response to today's treatment session and progress with Physical Therapy plan of care as indicated.    Plan of care Certification: 3/19/2024 to 5/19/24.     Outpatient Physical Therapy 2 times weekly for 8 weeks to include any combination of the following interventions: electrical stimulation (PRN), Gait Training, Manual Therapy, Neuromuscular Re-ed, Patient Education/Self Care, Therapeutic Activites, Therapeutic Exercise, and Dry Needling    Ricarda Quintero PT

## 2024-04-29 ENCOUNTER — CLINICAL SUPPORT (OUTPATIENT)
Dept: REHABILITATION | Facility: HOSPITAL | Age: 29
End: 2024-04-29
Payer: COMMERCIAL

## 2024-04-29 DIAGNOSIS — R68.89 DECREASED FUNCTIONAL ACTIVITY TOLERANCE: Primary | ICD-10-CM

## 2024-04-29 DIAGNOSIS — R53.1 DECREASED STRENGTH: ICD-10-CM

## 2024-04-29 DIAGNOSIS — R26.9 GAIT ABNORMALITY: ICD-10-CM

## 2024-04-29 DIAGNOSIS — R26.89 BALANCE PROBLEM: ICD-10-CM

## 2024-04-29 PROCEDURE — 97140 MANUAL THERAPY 1/> REGIONS: CPT

## 2024-04-29 PROCEDURE — 97112 NEUROMUSCULAR REEDUCATION: CPT

## 2024-04-29 NOTE — PROGRESS NOTES
[facility]   Physical Therapy Treatment Note        Name: Swati Durán  Clinic Number: [id number]    Therapy Diagnosis:   Encounter Diagnoses   Name Primary?    Decreased functional activity tolerance Yes    Gait abnormality     Balance problem     Decreased strength        Physician: Dayanna Morelos DPM    Visit Date: 4/29/2024    Physician Orders: PT Eval and Treat  Medical Diagnosis from Referral: Sprain of anterior talofibular ligament of left ankle, initial encounter; Pain of joint of left ankle and foot  Evaluation Date: 3/19/2024  Authorization Period Expiration: 12/31/24  Plan of Care Expiration: 5/19/2024  Progress Note Due: 4/19/2024  Visit # / Visits authorized: 9/20 (+1)  FOTO: 1/3 (last performed on 3/19/2024)       Precautions: Standard  PTA Visit #: 0/5     Time In: 1500  Time Out: 1603  Total Billable Time: 60 minutes      SUBJECTIVE     Pt reports: she is a little sore today 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  Response to previous treatment: no change  Functional change: no change    Pain: 0/10  Location: left ankle pain along medial post tibialis area and lateral calcaneus      OBJECTIVE     Objective Measures updated at progress report unless specified.     TREATMENT     Swati received the treatments listed below:      Patient received NO Blood Flow Restriction after being screened for contraindications, assessed for precautions, and educated in the benefits/risks associated with the use of blood flow restriction training.  left Lower Extremity at 60-80% occlusion @ proximal thigh -- Cuff Size: blue     8 minutes or less of occlusion for each exercise was performed, with deflations between each exercise for a minimum of 2 minutes     Blood flow restriction  Performed  Reps: 30, 15, 15, 15  (75 total) with 30s rest between reps   Sitting ankle DF/PF x Body  Weight   Supine ankle inversion/eversion x Body Weight   MOBU Board  evens x Body Weight STAND        therapeutic exercises to develop strength, endurance, ROM, flexibility, and core stabilization for 5 minutes including:    Intervention 4/29/2024  Parameters   Upright Bike  x 5 min        Ankle theraband  -  -  -  - 10x/ 3 sets red band; DF  10x/ 3 sets PF  10x/ 3 sets; inversion  10x/ 3 sets eversion        bridge  10x/ 3 sets                                     manual therapy techniques: Joint mobilizations, Myofacial release, and Soft tissue Mobilization were applied to the: left LE for 10 minutes, including:    Manual Intervention 4/29/2024     Soft Tissue Mobilization x left calf muscle's    Joint Mobilizations  DF; fibular glides superior   Mobilization with movement          Functional Dry Needling           neuromuscular re-education activities to improve: Balance, Coordination, Proprioception, and Posture for 45 minutes. The following activities were included:    Intervention 4/29/2024  Parameters   Sitting ankle DF/PF STAND x    Supine ankle inversion/eversion x    MOBO Board  evens STAND x         Toe yoga  3 min each (standing)   Sidelying hip   Abduction 10x/ 3 sets  Clamshells 10x/ 3 sets  Reverse clamshells 10x/ 3 sets   sitting hip flexion with band at feet  10x/ 3 sets   Squats with arch control  x 3x10    Step downs  x 4 inch 10x B (then discontinued to work on arch control)    Doming (standing)  x 20x    Single leg stance + kickstand (focus on arch control) x 3x30s B      Swati participated in dynamic functional therapeutic activities to improve functional performance for 0 minutes, including:    Intervention 4/29/2024  Parameters   Sidelying dynamic balance  10x yellow band shoulder extension with SLB all 4 directions   BOSU/airex squats  10x/ 2 sets   sidestepping band at feet  Pink ankle strap 10x/ 2 sets each way    heel raises on leg press                                              Plan  for Next Visit: move to 1 visit BFR, 1 visit standing activities - SL balance, dynamic balance activities with SLB, , hip thrusters, sidelying hip series.        PATIENT EDUCATION AND HOME EXERCISES     Home Exercises Provided and Patient Education Provided     Education provided:   - home exercise program     Written Home Exercises Provided: Patient instructed to cont prior HEP. Exercises were reviewed and Swati was able to demonstrate them prior to the end of the session.  Swati demonstrated good  understanding of the education provided. See EMR under Patient Instructions for exercises provided during therapy sessions      ASSESSMENT     Patient tolerated session well today. Blood flow restriction was again incorporated today after being cleared for all contradictions. Intervention performed in order to improve strength gains with lower load interventions. Pt tolerated intervention well with only reports of significant muscle fatigue but no pain throughout. Incorporated doming to improve arch stability with standing interventions; pt notes significant fatigue in the arch of the foot     Swati Is progressing well towards her goals.   Pt prognosis is Good.     Pt will continue to benefit from skilled outpatient physical therapy to address the deficits listed in the problem list box on initial evaluation, provide pt/family education and to maximize pt's level of independence in the home and community environment.     Pt's spiritual, cultural and educational needs considered and pt agreeable to plan of care and goals.     Anticipated barriers to physical therapy:  co-morbidities, chronicity of condition, adherence to treatment plan, financial limitations, occupation, and coping style     Goals:   Reviewed: 4/29/2024       Short Term Goals: In 4 weeks  Progress Date   1.Patient to be educated on HEP. [x] Progressing  [] MET   [] Not MET   [] Not tested     2.Patient to increase left ankle range of motion, in  order to improve available range of motion for ADL's.  [x] Progressing  [] MET   [] Not MET  [] Not tested     3.Patient to increase bilateral LE strength by 1/2 grade, in order to improve endurance and increase ability to perform all functional activities for increased time.  [x] Progressing  [] MET   [] Not MET  [] Not tested     4.Patient to have pain less than 5/10 at worst, to improve QOL. [x] Progressing  [] MET   [] Not MET  [] Not tested     5.Patient to improve score on the FOTO, to improve QOL. [x] Progressing  [] MET   [] Not MET  [] Not tested     6. Patient to improve score on single leg balance in order to decrease fall risk. [x] Progressing  [] MET   [] Not MET  [] Not tested        Long Term Goals: In 8 weeks Progress Date   1.Patient to improve score on the FOTO predicted score or better, to improve QOL. [x] Progressing  [] MET   [] Not MET  [] Not tested     2. Patient to increase bilateral LE strength to 5/5 or greater, in order to improve endurance and increase ability to perform all functional activities for increased time. [x] Progressing  [] MET   [] Not MET  [] Not tested     3. Patient to have decreased pain to 2/10 at worst, to improve QOL. [x] Progressing  [] MET   [] Not MET  [] Not tested     4. Patient to normalize score on single leg balance, in order to improve endurance and decrease fall risk. [x] Progressing  [] MET   [] Not MET  [] Not tested     5. Patient to perform daily activities including tumbling and dance without increased symptoms. [x] Progressing  [] MET   [] Not MET  [] Not tested          PLAN     Monitor response to today's treatment session and progress with Physical Therapy plan of care as indicated.    Plan of care Certification: 3/19/2024 to 5/19/24.     Outpatient Physical Therapy 2 times weekly for 8 weeks to include any combination of the following interventions: electrical stimulation (PRN), Gait Training, Manual Therapy, Neuromuscular Re-ed, Patient  Education/Self Care, Therapeutic Activites, Therapeutic Exercise, and Dry Needling    Tara Maki, PT

## 2024-05-01 ENCOUNTER — CLINICAL SUPPORT (OUTPATIENT)
Dept: REHABILITATION | Facility: HOSPITAL | Age: 29
End: 2024-05-01
Payer: COMMERCIAL

## 2024-05-01 DIAGNOSIS — R26.89 BALANCE PROBLEM: ICD-10-CM

## 2024-05-01 DIAGNOSIS — R68.89 DECREASED FUNCTIONAL ACTIVITY TOLERANCE: Primary | ICD-10-CM

## 2024-05-01 DIAGNOSIS — R53.1 DECREASED STRENGTH: ICD-10-CM

## 2024-05-01 DIAGNOSIS — R26.9 GAIT ABNORMALITY: ICD-10-CM

## 2024-05-01 PROCEDURE — 97112 NEUROMUSCULAR REEDUCATION: CPT

## 2024-05-01 PROCEDURE — 97140 MANUAL THERAPY 1/> REGIONS: CPT

## 2024-05-01 PROCEDURE — 97110 THERAPEUTIC EXERCISES: CPT

## 2024-05-01 NOTE — PROGRESS NOTES
[facility]   Physical Therapy Treatment Note & PROGRESS NOTE       Name: Swati Durán  Clinic Number: [id number]    Therapy Diagnosis:   Encounter Diagnoses   Name Primary?    Decreased functional activity tolerance Yes    Gait abnormality     Balance problem     Decreased strength        Physician: Dayanna Morelos DPM    Visit Date: 5/1/2024    Physician Orders: PT Eval and Treat  Medical Diagnosis from Referral: Sprain of anterior talofibular ligament of left ankle, initial encounter; Pain of joint of left ankle and foot  Evaluation Date: 3/19/2024  Authorization Period Expiration: 12/31/24  Plan of Care Expiration: 5/19/2024  Progress Note Due: 5/19/2024  Visit # / Visits authorized: 10/20 (+1)  FOTO: 2/3 (last performed on 5/1/2024)       Precautions: Standard  PTA Visit #: 0/5     Time In: 4:00 pm  Time Out: 5:00 pm  Total Billable Time: 60 minutes      SUBJECTIVE     Pt reports: she is still sore, some shooting pain in ankle at times when sitting with leg's crossed. Overall she is feeling better but is still having soreness indicates posterior tibialis muscle's. Was very sore after arch lifts last session.    She was compliant with home exercise program.  Response to previous treatment: no change  Functional change: no change    Pain: 3/10  Location: left ankle pain along medial post tibialis area and lateral calcaneus      OBJECTIVE     Objective Measures updated at progress report unless specified.        Balance  Right   (seconds) Left  (seconds) Norms Left  5/1/24   Single Leg Stance 30 5 Less than 4.9 sec high risk  5-6.4 sec increased risk  6.5 or greater low risk 20 sec        Range of Motion:     Ankle/Foot AROM/PROM Right Left 5/1/24  Left    Dorsiflexion (20º) 10 -5 52   Plantarflexion (50º) 60 40  10   Inversion (35º) 55 60 45   Eversion (15º) 25 15 20   Great Toe Extension (70º) 60 65  NT      Strength:     L/E MMT Right  (spine) Left Pain/Dysfunction with  Movement 5/1/24  Left    Hip Flexion  4/5 4/5 + trunk shift NT   Knee Extension 4/5 4/5 + trunk shift NT   Knee Flexion 4/5 4/5 + trunk shift NT   Hip IR 4/5 4/5 + trunk shift NT   Hip ER 4/5 4/5 + trunk shift NT   Ankle DF 4/5 4-/5   5/5   Ankle PF 4/5 4-/5   5/5   Ankle Inversion 4-/5 3+/5 Pain on left  4+/5-5/5     Ankle Eversion 4-/5 3+/5 Pain on left  4+/5       5/1/24: 14 heel raises single leg.     FOTO:             TREATMENT     Swati received the treatments listed below:      Patient received NO Blood Flow Restriction after being screened for contraindications, assessed for precautions, and educated in the benefits/risks associated with the use of blood flow restriction training.  left Lower Extremity at 60-80% occlusion @ proximal thigh -- Cuff Size: blue     8 minutes or less of occlusion for each exercise was performed, with deflations between each exercise for a minimum of 2 minutes     Blood flow restriction  Performed  Reps: 30, 15, 15, 15  (75 total) with 30s rest between reps   Sitting ankle DF/PF - Body Weight   Supine ankle inversion/eversion - Body Weight   MOBU Board  evens - Body Weight STAND        therapeutic exercises to develop strength, endurance, ROM, flexibility, and core stabilization for 25 minutes including:    Intervention 5/1/2024  Parameters   Upright Bike  x 5 min        Ankle theraband  -  -  -  - 10x/ 3 sets red band; DF  10x/ 3 sets PF  10x/ 3 sets; inversion  10x/ 3 sets eversion        bridge x 10x/ 3 sets                            FOTO/Re-assess x        manual therapy techniques: Joint mobilizations, Myofacial release, and Soft tissue Mobilization were applied to the: left LE for 10 minutes, including:    Manual Intervention 5/1/2024     Soft Tissue Mobilization x left calf muscle's & post tibialis   Joint Mobilizations  DF; fibular glides superior   Mobilization with movement          Functional Dry Needling           neuromuscular re-education activities to improve:  Balance, Coordination, Proprioception, and Posture for 10 minutes. The following activities were included:    Intervention 5/1/2024  Parameters   Sitting ankle DF/PF STAND -    Supine ankle inversion/eversion -    MOBO Board  STAND x Evens/odds 2 min each        Toe yoga  3 min each (standing)   Sidelying hip   Abduction 10x/ 3 sets  Clamshells 10x/ 3 sets  Reverse clamshells 10x/ 3 sets   sitting hip flexion with band at feet  10x/ 3 sets   Squats with arch control  x x10    Step downs  - 4 inch 10x B (then discontinued to work on arch control)    Doming (standing)  x 20x    Single leg stance + kickstand (focus on arch control) - 3x30s B      Swati participated in dynamic functional therapeutic activities to improve functional performance for 4 minutes, including:    Intervention 5/1/2024  Parameters   Sidelying dynamic balance - 10x yellow band shoulder extension with SLB all 4 directions   BOSU/airex squats  10x/ 2 sets   sidestepping band at feet  Pink ankle strap 10x/ 2 sets each way    heel raises on leg press          Jumping on shuttle x  x 20x 1 band  20x 2 bands                                  Plan for Next Visit: move to 1 visit BFR, 1 visit standing activities - SL balance, dynamic balance activities with SLB, , hip thrusters, sidelying hip series.        PATIENT EDUCATION AND HOME EXERCISES     Home Exercises Provided and Patient Education Provided     Education provided:   - home exercise program     Written Home Exercises Provided: Patient instructed to cont prior HEP. Exercises were reviewed and Swati was able to demonstrate them prior to the end of the session.  Swati demonstrated good  understanding of the education provided. See EMR under Patient Instructions for exercises provided during therapy sessions      ASSESSMENT     Patient tolerated session well today.  She continues to have difficulty with doming and foot control. She has made excellent progress with all objective measurements  and is reporting improved overall function verbally and per FOTO. She should benefit from continued skilled therapy treatment to address all goals and return to gymnastic/cheer activities.    Swati Is progressing well towards her goals.   Pt prognosis is Good.     Pt will continue to benefit from skilled outpatient physical therapy to address the deficits listed in the problem list box on initial evaluation, provide pt/family education and to maximize pt's level of independence in the home and community environment.     Pt's spiritual, cultural and educational needs considered and pt agreeable to plan of care and goals.     Anticipated barriers to physical therapy:  co-morbidities, chronicity of condition, adherence to treatment plan, financial limitations, occupation, and coping style     Goals:   Reviewed: 5/1/2024       Short Term Goals: In 4 weeks  Progress Date   1.Patient to be educated on HEP. [x] Progressing  [] MET   [] Not MET   [] Not tested  5/1/2024   2.Patient to increase left ankle range of motion, in order to improve available range of motion for ADL's.  [x] Progressing  [] MET   [] Not MET  [] Not tested  5/1/2024   3.Patient to increase bilateral LE strength by 1/2 grade, in order to improve endurance and increase ability to perform all functional activities for increased time.  [] Progressing  [x] MET   [] Not MET  [] Not tested  5/1/2024   4.Patient to have pain less than 5/10 at worst, to improve QOL. [] Progressing  [x] MET   [] Not MET  [] Not tested  5/1/2024   5.Patient to improve score on the FOTO, to improve QOL. [] Progressing  [x] MET   [] Not MET  [] Not tested  5/1/2024   6. Patient to improve score on single leg balance in order to decrease fall risk. [] Progressing  [x] MET   [] Not MET  [] Not tested  5/1/2024      Long Term Goals: In 8 weeks Progress Date   1.Patient to improve score on the FOTO predicted score or better, to improve QOL. [] Progressing  [x] MET   [] Not MET  []  Not tested  5/1/2024   2. Patient to increase bilateral LE strength to 5/5 or greater, in order to improve endurance and increase ability to perform all functional activities for increased time. [x] Progressing  [] MET   [] Not MET  [] Not tested  5/1/2024   3. Patient to have decreased pain to 2/10 at worst, to improve QOL. [x] Progressing  [] MET   [] Not MET  [] Not tested  5/1/2024   4. Patient to normalize score on single leg balance, in order to improve endurance and decrease fall risk. [x] Progressing  [] MET   [] Not MET  [] Not tested  5/1/2024   5. Patient to perform daily activities including tumbling and dance without increased symptoms. [x] Progressing  [] MET   [] Not MET  [] Not tested  5/1/2024        PLAN     Monitor response to today's treatment session and progress with Physical Therapy plan of care as indicated.    Plan of care Certification: 3/19/2024 to 5/19/24.     Outpatient Physical Therapy 2 times weekly for 8 weeks to include any combination of the following interventions: electrical stimulation (PRN), Gait Training, Manual Therapy, Neuromuscular Re-ed, Patient Education/Self Care, Therapeutic Activites, Therapeutic Exercise, and Dry Needling    Ricarda Quintero PT

## 2024-05-03 NOTE — PLAN OF CARE
[facility]   Physical Therapy Treatment Note & PROGRESS NOTE       Name: Swati Durán  Clinic Number: [id number]    Therapy Diagnosis:   Encounter Diagnoses   Name Primary?    Decreased functional activity tolerance Yes    Gait abnormality     Balance problem     Decreased strength        Physician: Dayanna Morelos DPM    Visit Date: 5/1/2024    Physician Orders: PT Eval and Treat  Medical Diagnosis from Referral: Sprain of anterior talofibular ligament of left ankle, initial encounter; Pain of joint of left ankle and foot  Evaluation Date: 3/19/2024  Authorization Period Expiration: 12/31/24  Plan of Care Expiration: 5/19/2024  Progress Note Due: 5/19/2024  Visit # / Visits authorized: 10/20 (+1)  FOTO: 2/3 (last performed on 5/1/2024)       Precautions: Standard  PTA Visit #: 0/5     Time In: 4:00 pm  Time Out: 5:00 pm  Total Billable Time: 60 minutes      SUBJECTIVE     Pt reports: she is still sore, some shooting pain in ankle at times when sitting with leg's crossed. Overall she is feeling better but is still having soreness indicates posterior tibialis muscle's. Was very sore after arch lifts last session.    She was compliant with home exercise program.  Response to previous treatment: no change  Functional change: no change    Pain: 3/10  Location: left ankle pain along medial post tibialis area and lateral calcaneus      OBJECTIVE     Objective Measures updated at progress report unless specified.        Balance  Right   (seconds) Left  (seconds) Norms Left  5/1/24   Single Leg Stance 30 5 Less than 4.9 sec high risk  5-6.4 sec increased risk  6.5 or greater low risk 20 sec        Range of Motion:     Ankle/Foot AROM/PROM Right Left 5/1/24  Left    Dorsiflexion (20º) 10 -5 52   Plantarflexion (50º) 60 40  10   Inversion (35º) 55 60 45   Eversion (15º) 25 15 20   Great Toe Extension (70º) 60 65  NT      Strength:     L/E MMT Right  (spine) Left Pain/Dysfunction with  Movement 5/1/24  Left    Hip Flexion  4/5 4/5 + trunk shift NT   Knee Extension 4/5 4/5 + trunk shift NT   Knee Flexion 4/5 4/5 + trunk shift NT   Hip IR 4/5 4/5 + trunk shift NT   Hip ER 4/5 4/5 + trunk shift NT   Ankle DF 4/5 4-/5   5/5   Ankle PF 4/5 4-/5   5/5   Ankle Inversion 4-/5 3+/5 Pain on left  4+/5-5/5     Ankle Eversion 4-/5 3+/5 Pain on left  4+/5       5/1/24: 14 heel raises single leg.     FOTO:             TREATMENT     Swati received the treatments listed below:      Patient received NO Blood Flow Restriction after being screened for contraindications, assessed for precautions, and educated in the benefits/risks associated with the use of blood flow restriction training.  left Lower Extremity at 60-80% occlusion @ proximal thigh -- Cuff Size: blue     8 minutes or less of occlusion for each exercise was performed, with deflations between each exercise for a minimum of 2 minutes     Blood flow restriction  Performed  Reps: 30, 15, 15, 15  (75 total) with 30s rest between reps   Sitting ankle DF/PF - Body Weight   Supine ankle inversion/eversion - Body Weight   MOBU Board  evens - Body Weight STAND        therapeutic exercises to develop strength, endurance, ROM, flexibility, and core stabilization for 25 minutes including:    Intervention 5/1/2024  Parameters   Upright Bike  x 5 min        Ankle theraband  -  -  -  - 10x/ 3 sets red band; DF  10x/ 3 sets PF  10x/ 3 sets; inversion  10x/ 3 sets eversion        bridge x 10x/ 3 sets                            FOTO/Re-assess x        manual therapy techniques: Joint mobilizations, Myofacial release, and Soft tissue Mobilization were applied to the: left LE for 10 minutes, including:    Manual Intervention 5/1/2024     Soft Tissue Mobilization x left calf muscle's & post tibialis   Joint Mobilizations  DF; fibular glides superior   Mobilization with movement          Functional Dry Needling           neuromuscular re-education activities to improve:  Balance, Coordination, Proprioception, and Posture for 10 minutes. The following activities were included:    Intervention 5/1/2024  Parameters   Sitting ankle DF/PF STAND -    Supine ankle inversion/eversion -    MOBO Board  STAND x Evens/odds 2 min each        Toe yoga  3 min each (standing)   Sidelying hip   Abduction 10x/ 3 sets  Clamshells 10x/ 3 sets  Reverse clamshells 10x/ 3 sets   sitting hip flexion with band at feet  10x/ 3 sets   Squats with arch control  x x10    Step downs  - 4 inch 10x B (then discontinued to work on arch control)    Doming (standing)  x 20x    Single leg stance + kickstand (focus on arch control) - 3x30s B      Swati participated in dynamic functional therapeutic activities to improve functional performance for 4 minutes, including:    Intervention 5/1/2024  Parameters   Sidelying dynamic balance - 10x yellow band shoulder extension with SLB all 4 directions   BOSU/airex squats  10x/ 2 sets   sidestepping band at feet  Pink ankle strap 10x/ 2 sets each way    heel raises on leg press          Jumping on shuttle x  x 20x 1 band  20x 2 bands                                  Plan for Next Visit: move to 1 visit BFR, 1 visit standing activities - SL balance, dynamic balance activities with SLB, , hip thrusters, sidelying hip series.        PATIENT EDUCATION AND HOME EXERCISES     Home Exercises Provided and Patient Education Provided     Education provided:   - home exercise program     Written Home Exercises Provided: Patient instructed to cont prior HEP. Exercises were reviewed and Swati was able to demonstrate them prior to the end of the session.  Swati demonstrated good  understanding of the education provided. See EMR under Patient Instructions for exercises provided during therapy sessions      ASSESSMENT     Patient tolerated session well today.  She continues to have difficulty with doming and foot control. She has made excellent progress with all objective measurements  and is reporting improved overall function verbally and per FOTO. She should benefit from continued skilled therapy treatment to address all goals and return to gymnastic/cheer activities.    Swati Is progressing well towards her goals.   Pt prognosis is Good.     Pt will continue to benefit from skilled outpatient physical therapy to address the deficits listed in the problem list box on initial evaluation, provide pt/family education and to maximize pt's level of independence in the home and community environment.     Pt's spiritual, cultural and educational needs considered and pt agreeable to plan of care and goals.     Anticipated barriers to physical therapy:  co-morbidities, chronicity of condition, adherence to treatment plan, financial limitations, occupation, and coping style     Goals:   Reviewed: 5/1/2024       Short Term Goals: In 4 weeks  Progress Date   1.Patient to be educated on HEP. [x] Progressing  [] MET   [] Not MET   [] Not tested  5/1/2024   2.Patient to increase left ankle range of motion, in order to improve available range of motion for ADL's.  [x] Progressing  [] MET   [] Not MET  [] Not tested  5/1/2024   3.Patient to increase bilateral LE strength by 1/2 grade, in order to improve endurance and increase ability to perform all functional activities for increased time.  [] Progressing  [x] MET   [] Not MET  [] Not tested  5/1/2024   4.Patient to have pain less than 5/10 at worst, to improve QOL. [] Progressing  [x] MET   [] Not MET  [] Not tested  5/1/2024   5.Patient to improve score on the FOTO, to improve QOL. [] Progressing  [x] MET   [] Not MET  [] Not tested  5/1/2024   6. Patient to improve score on single leg balance in order to decrease fall risk. [] Progressing  [x] MET   [] Not MET  [] Not tested  5/1/2024      Long Term Goals: In 8 weeks Progress Date   1.Patient to improve score on the FOTO predicted score or better, to improve QOL. [] Progressing  [x] MET   [] Not MET  []  Not tested  5/1/2024   2. Patient to increase bilateral LE strength to 5/5 or greater, in order to improve endurance and increase ability to perform all functional activities for increased time. [x] Progressing  [] MET   [] Not MET  [] Not tested  5/1/2024   3. Patient to have decreased pain to 2/10 at worst, to improve QOL. [x] Progressing  [] MET   [] Not MET  [] Not tested  5/1/2024   4. Patient to normalize score on single leg balance, in order to improve endurance and decrease fall risk. [x] Progressing  [] MET   [] Not MET  [] Not tested  5/1/2024   5. Patient to perform daily activities including tumbling and dance without increased symptoms. [x] Progressing  [] MET   [] Not MET  [] Not tested  5/1/2024        PLAN     Monitor response to today's treatment session and progress with Physical Therapy plan of care as indicated.    Plan of care Certification: 3/19/2024 to 5/19/24.     Outpatient Physical Therapy 2 times weekly for 8 weeks to include any combination of the following interventions: electrical stimulation (PRN), Gait Training, Manual Therapy, Neuromuscular Re-ed, Patient Education/Self Care, Therapeutic Activites, Therapeutic Exercise, and Dry Needling    Ricarda Quintero PT

## 2024-05-06 ENCOUNTER — CLINICAL SUPPORT (OUTPATIENT)
Dept: REHABILITATION | Facility: HOSPITAL | Age: 29
End: 2024-05-06
Payer: COMMERCIAL

## 2024-05-06 DIAGNOSIS — R26.89 BALANCE PROBLEM: ICD-10-CM

## 2024-05-06 DIAGNOSIS — R68.89 DECREASED FUNCTIONAL ACTIVITY TOLERANCE: Primary | ICD-10-CM

## 2024-05-06 DIAGNOSIS — R26.9 GAIT ABNORMALITY: ICD-10-CM

## 2024-05-06 DIAGNOSIS — R53.1 DECREASED STRENGTH: ICD-10-CM

## 2024-05-06 PROCEDURE — 97110 THERAPEUTIC EXERCISES: CPT

## 2024-05-06 PROCEDURE — 97112 NEUROMUSCULAR REEDUCATION: CPT

## 2024-05-06 PROCEDURE — 97140 MANUAL THERAPY 1/> REGIONS: CPT

## 2024-05-06 NOTE — PROGRESS NOTES
[facility]   Physical Therapy Treatment Note & PROGRESS NOTE       Name: Swati Durán  Clinic Number: [id number]    Therapy Diagnosis:   Encounter Diagnoses   Name Primary?    Decreased functional activity tolerance Yes    Gait abnormality     Balance problem     Decreased strength        Physician: Dayanna Morelos DPM    Visit Date: 5/6/2024    Physician Orders: PT Eval and Treat  Medical Diagnosis from Referral: Sprain of anterior talofibular ligament of left ankle, initial encounter; Pain of joint of left ankle and foot  Evaluation Date: 3/19/2024  Authorization Period Expiration: 12/31/24  Plan of Care Expiration: 5/19/2024  Progress Note Due: 5/19/2024  Visit # / Visits authorized: 10/20 (+1)  FOTO: 2/3 (last performed on 5/1/2024)       Precautions: Standard  PTA Visit #: 0/5     Time In: 4:00 pm  Time Out: 4:50 pm  Total Billable Time: 40 minutes      SUBJECTIVE     Pt reports: she is not feeling well today - but thinks it is just sinuses. Agreeable to BFR today.    She was compliant with home exercise program.  Response to previous treatment: no change  Functional change: no change    Pain: NT/10  Location: left ankle pain along medial post tibialis area and lateral calcaneus      OBJECTIVE     Objective Measures updated at progress report unless specified.       TREATMENT     Swati received the treatments listed below:      Patient received NO Blood Flow Restriction after being screened for contraindications, assessed for precautions, and educated in the benefits/risks associated with the use of blood flow restriction training.  left Lower Extremity at 60-80% occlusion @ proximal thigh -- Cuff Size: blue     8 minutes or less of occlusion for each exercise was performed, with deflations between each exercise for a minimum of 2 minutes     Blood flow restriction  Performed  Reps: 30, 15, 15, 15  (75 total) with 30s rest between reps   STAND ankle DF/PF x Body Weight    Supine ankle inversion/eversion x Body Weight   MOBU Board  evens x Body Weight STAND        therapeutic exercises to develop strength, endurance, ROM, flexibility, and core stabilization for 8 minutes including:    Intervention 5/6/2024  Parameters   Upright Bike  x 8 min        Ankle theraband  -  -  -  - 10x/ 3 sets red band; DF  10x/ 3 sets PF  10x/ 3 sets; inversion  10x/ 3 sets eversion        bridge - 10x/ 3 sets                            FOTO/Re-assess         manual therapy techniques: Joint mobilizations, Myofacial release, and Soft tissue Mobilization were applied to the: left LE for 8 minutes, including:    Manual Intervention 5/6/2024     Soft Tissue Mobilization x left calf muscle's & post tibialis   Joint Mobilizations  DF; fibular glides superior   Mobilization with movement          Functional Dry Needling           neuromuscular re-education activities to improve: Balance, Coordination, Proprioception, and Posture for 24 minutes. The following activities were included:    Intervention 5/6/2024  Parameters   ankle DF/PF STAND x With BFR   Supine ankle inversion/eversion x With BFR   MOBO Board  STAND x With BFR Evens (no odds)         Toe yoga  3 min each (standing)   Sidelying hip   Abduction 10x/ 3 sets  Clamshells 10x/ 3 sets  Reverse clamshells 10x/ 3 sets   sitting hip flexion with band at feet  10x/ 3 sets   Squats with arch control  - x10    Step downs  - 4 inch 10x B (then discontinued to work on arch control)    Doming (standing)  - 20x    Single leg stance + kickstand (focus on arch control) - 3x30s B      Swati participated in dynamic functional therapeutic activities to improve functional performance for 0 minutes, including:    Intervention 5/6/2024  Parameters   Sidelying dynamic balance - 10x yellow band shoulder extension with SLB all 4 directions   BOSU/airex squats  10x/ 2 sets   sidestepping band at feet  Pink ankle strap 10x/ 2 sets each way    heel raises on leg press           Jumping on shuttle -  - 20x 1 band  20x 2 bands                                  Plan for Next Visit: move to 1 visit BFR, 1 visit standing activities - SL balance, dynamic balance activities with SLB, , hip thrusters, sidelying hip series.        PATIENT EDUCATION AND HOME EXERCISES     Home Exercises Provided and Patient Education Provided     Education provided:   - home exercise program     Written Home Exercises Provided: Patient instructed to cont prior HEP. Exercises were reviewed and Swati was able to demonstrate them prior to the end of the session.  Swati demonstrated good  understanding of the education provided. See EMR under Patient Instructions for exercises provided during therapy sessions      ASSESSMENT     Patient tolerated session well today.  She was able to tolerate treatment as documented without complaint. Increased tone over posterior tibialis muscle's. Encouraged home exercise program as feels better.    Swati Is progressing well towards her goals.   Pt prognosis is Good.     Pt will continue to benefit from skilled outpatient physical therapy to address the deficits listed in the problem list box on initial evaluation, provide pt/family education and to maximize pt's level of independence in the home and community environment.     Pt's spiritual, cultural and educational needs considered and pt agreeable to plan of care and goals.     Anticipated barriers to physical therapy:  co-morbidities, chronicity of condition, adherence to treatment plan, financial limitations, occupation, and coping style     Goals:   Reviewed: 5/6/2024       Short Term Goals: In 4 weeks  Progress Date   1.Patient to be educated on HEP. [x] Progressing  [] MET   [] Not MET   [] Not tested  5/1/2024   2.Patient to increase left ankle range of motion, in order to improve available range of motion for ADL's.  [x] Progressing  [] MET   [] Not MET  [] Not tested  5/1/2024   3.Patient to increase bilateral LE  strength by 1/2 grade, in order to improve endurance and increase ability to perform all functional activities for increased time.  [] Progressing  [x] MET   [] Not MET  [] Not tested  5/1/2024   4.Patient to have pain less than 5/10 at worst, to improve QOL. [] Progressing  [x] MET   [] Not MET  [] Not tested  5/1/2024   5.Patient to improve score on the FOTO, to improve QOL. [] Progressing  [x] MET   [] Not MET  [] Not tested  5/1/2024   6. Patient to improve score on single leg balance in order to decrease fall risk. [] Progressing  [x] MET   [] Not MET  [] Not tested  5/1/2024      Long Term Goals: In 8 weeks Progress Date   1.Patient to improve score on the FOTO predicted score or better, to improve QOL. [] Progressing  [x] MET   [] Not MET  [] Not tested  5/1/2024   2. Patient to increase bilateral LE strength to 5/5 or greater, in order to improve endurance and increase ability to perform all functional activities for increased time. [x] Progressing  [] MET   [] Not MET  [] Not tested  5/1/2024   3. Patient to have decreased pain to 2/10 at worst, to improve QOL. [x] Progressing  [] MET   [] Not MET  [] Not tested  5/1/2024   4. Patient to normalize score on single leg balance, in order to improve endurance and decrease fall risk. [x] Progressing  [] MET   [] Not MET  [] Not tested  5/1/2024   5. Patient to perform daily activities including tumbling and dance without increased symptoms. [x] Progressing  [] MET   [] Not MET  [] Not tested  5/1/2024        PLAN     Monitor response to today's treatment session and progress with Physical Therapy plan of care as indicated.    Plan of care Certification: 3/19/2024 to 5/19/24.     Outpatient Physical Therapy 2 times weekly for 8 weeks to include any combination of the following interventions: electrical stimulation (PRN), Gait Training, Manual Therapy, Neuromuscular Re-ed, Patient Education/Self Care, Therapeutic Activites, Therapeutic Exercise, and Dry  Cynthia Quintero, PT

## 2024-05-08 ENCOUNTER — CLINICAL SUPPORT (OUTPATIENT)
Dept: REHABILITATION | Facility: HOSPITAL | Age: 29
End: 2024-05-08
Payer: COMMERCIAL

## 2024-05-08 ENCOUNTER — PATIENT MESSAGE (OUTPATIENT)
Dept: RESEARCH | Facility: HOSPITAL | Age: 29
End: 2024-05-08
Payer: COMMERCIAL

## 2024-05-08 DIAGNOSIS — R26.9 GAIT ABNORMALITY: ICD-10-CM

## 2024-05-08 DIAGNOSIS — R68.89 DECREASED FUNCTIONAL ACTIVITY TOLERANCE: Primary | ICD-10-CM

## 2024-05-08 DIAGNOSIS — R26.89 BALANCE PROBLEM: ICD-10-CM

## 2024-05-08 DIAGNOSIS — R53.1 DECREASED STRENGTH: ICD-10-CM

## 2024-05-08 PROCEDURE — 97110 THERAPEUTIC EXERCISES: CPT

## 2024-05-08 PROCEDURE — 97530 THERAPEUTIC ACTIVITIES: CPT

## 2024-05-08 PROCEDURE — 97112 NEUROMUSCULAR REEDUCATION: CPT

## 2024-05-08 PROCEDURE — 97140 MANUAL THERAPY 1/> REGIONS: CPT

## 2024-05-08 NOTE — PROGRESS NOTES
[facility]   Physical Therapy Treatment Note        Name: Swati Durán  Clinic Number: [id number]    Therapy Diagnosis:   Encounter Diagnoses   Name Primary?    Decreased functional activity tolerance Yes    Gait abnormality     Balance problem     Decreased strength        Physician: Dayanna Morelos DPM    Visit Date: 5/8/2024    Physician Orders: PT Eval and Treat  Medical Diagnosis from Referral: Sprain of anterior talofibular ligament of left ankle, initial encounter; Pain of joint of left ankle and foot  Evaluation Date: 3/19/2024  Authorization Period Expiration: 12/31/24  Plan of Care Expiration: 5/19/2024  Progress Note Due: 5/19/2024  Visit # / Visits authorized: 12/20 (+1)  FOTO: 2/3 (last performed on 5/1/2024)       Precautions: Standard  PTA Visit #: 0/5     Time In: 3:00 pm  Time Out: 3:55 pm  Total Billable Time: 53 minutes      SUBJECTIVE     Pt reports: she is feeling better today, reports difficulty with doming still and has been practicing all balance and doming at home.    She was compliant with home exercise program.  Response to previous treatment: no change  Functional change: no change    Pain: NT/10  Location: left ankle pain along medial post tibialis area and lateral calcaneus      OBJECTIVE     Objective Measures updated at progress report unless specified.       TREATMENT     Swati received the treatments listed below:      Patient received NO Blood Flow Restriction after being screened for contraindications, assessed for precautions, and educated in the benefits/risks associated with the use of blood flow restriction training.  left Lower Extremity at 60-80% occlusion @ proximal thigh -- Cuff Size: blue     8 minutes or less of occlusion for each exercise was performed, with deflations between each exercise for a minimum of 2 minutes     Blood flow restriction  Performed  Reps: 30, 15, 15, 15  (75 total) with 30s rest between reps   STAND ankle  DF/PF x Body Weight   Supine ankle inversion/eversion x Body Weight   MOBU Board  evens x Body Weight STAND        therapeutic exercises to develop strength, endurance, ROM, flexibility, and core stabilization for 8 minutes including:    Intervention 5/8/2024  Parameters   Upright Bike  x 8 min        Ankle theraband  -  -  -  - 10x/ 3 sets red band; DF  10x/ 3 sets PF  10x/ 3 sets; inversion  10x/ 3 sets eversion        bridge x 10x/ 3 sets                            FOTO/Re-assess         manual therapy techniques: Joint mobilizations, Myofacial release, and Soft tissue Mobilization were applied to the: left LE for 8 minutes, including:    Manual Intervention 5/8/2024     Soft Tissue Mobilization x left calf muscle's & post tibialis   Joint Mobilizations  DF; fibular glides superior   Mobilization with movement          Functional Dry Needling           neuromuscular re-education activities to improve: Balance, Coordination, Proprioception, and Posture for 22 minutes. The following activities were included:    Intervention 5/8/2024  Parameters   ankle DF/PF STAND - With BFR   Supine ankle inversion/eversion - With BFR   MOBO Board  STAND - With BFR Evens (no odds)         Toe yoga x 3 min each (standing)   Sidelying hip  x  x  x Abduction 10x/ 2 sets  Clamshells 10x/ 2 sets  Reverse clamshells 10x/ 2 sets   sitting hip flexion with band at feet - 10x/ 3 sets   Squats with arch control  - x10    Step downs  - 4 inch 10x B (then discontinued to work on arch control)    Doming (standing)  x 5x with KB pass 5#/ 2 sets each way   Single leg stance + kickstand (focus on arch control) - 3x30s MATIAS      Swati participated in dynamic functional therapeutic activities to improve functional performance for 15 minutes, including:    Intervention 5/8/2024  Parameters   standing dynamic balance x 10x yellow band shoulder extension with SLB all 4 directions   BOSU/airex squats x 10x/ 2 sets   sidestepping band at feet  Pink ankle  strap 10x/ 2 sets each way    heel raises on leg press          Jumping on shuttle x  x 20x 1 band SL   20x 2 bands DL   Mini jumps - land on toes x 30s 3 sets   Slow lateral hop/shuffle x 10x/ 2 sets                        Plan for Next Visit: move to 1 visit BFR, 1 visit standing activities - SL balance, dynamic balance activities with SLB, , hip thrusters, sidelying hip series.        PATIENT EDUCATION AND HOME EXERCISES     Home Exercises Provided and Patient Education Provided     Education provided:   - home exercise program     Written Home Exercises Provided: Patient instructed to cont prior HEP. Exercises were reviewed and Swati was able to demonstrate them prior to the end of the session.  Swati demonstrated good  understanding of the education provided. See EMR under Patient Instructions for exercises provided during therapy sessions      ASSESSMENT     Patient tolerated all treatment well and was able to progress as indicated. She did complain of fatigue but no increased ankle pain. Encouraged home exercise program and discussed progression of activities to jumping.    Swati Is progressing well towards her goals.   Pt prognosis is Good.     Pt will continue to benefit from skilled outpatient physical therapy to address the deficits listed in the problem list box on initial evaluation, provide pt/family education and to maximize pt's level of independence in the home and community environment.     Pt's spiritual, cultural and educational needs considered and pt agreeable to plan of care and goals.     Anticipated barriers to physical therapy:  co-morbidities, chronicity of condition, adherence to treatment plan, financial limitations, occupation, and coping style     Goals:   Reviewed: 5/8/2024       Short Term Goals: In 4 weeks  Progress Date   1.Patient to be educated on HEP. [x] Progressing  [] MET   [] Not MET   [] Not tested  5/1/2024   2.Patient to increase left ankle range of motion, in order  to improve available range of motion for ADL's.  [x] Progressing  [] MET   [] Not MET  [] Not tested  5/1/2024   3.Patient to increase bilateral LE strength by 1/2 grade, in order to improve endurance and increase ability to perform all functional activities for increased time.  [] Progressing  [x] MET   [] Not MET  [] Not tested  5/1/2024   4.Patient to have pain less than 5/10 at worst, to improve QOL. [] Progressing  [x] MET   [] Not MET  [] Not tested  5/1/2024   5.Patient to improve score on the FOTO, to improve QOL. [] Progressing  [x] MET   [] Not MET  [] Not tested  5/1/2024   6. Patient to improve score on single leg balance in order to decrease fall risk. [] Progressing  [x] MET   [] Not MET  [] Not tested  5/1/2024      Long Term Goals: In 8 weeks Progress Date   1.Patient to improve score on the FOTO predicted score or better, to improve QOL. [] Progressing  [x] MET   [] Not MET  [] Not tested  5/1/2024   2. Patient to increase bilateral LE strength to 5/5 or greater, in order to improve endurance and increase ability to perform all functional activities for increased time. [x] Progressing  [] MET   [] Not MET  [] Not tested  5/1/2024   3. Patient to have decreased pain to 2/10 at worst, to improve QOL. [x] Progressing  [] MET   [] Not MET  [] Not tested  5/1/2024   4. Patient to normalize score on single leg balance, in order to improve endurance and decrease fall risk. [x] Progressing  [] MET   [] Not MET  [] Not tested  5/1/2024   5. Patient to perform daily activities including tumbling and dance without increased symptoms. [x] Progressing  [] MET   [] Not MET  [] Not tested  5/1/2024        PLAN     Monitor response to today's treatment session and progress with Physical Therapy plan of care as indicated.    Plan of care Certification: 3/19/2024 to 5/19/24.     Outpatient Physical Therapy 2 times weekly for 8 weeks to include any combination of the following interventions: electrical stimulation  (PRN), Gait Training, Manual Therapy, Neuromuscular Re-ed, Patient Education/Self Care, Therapeutic Activites, Therapeutic Exercise, and Dry Needling    Ricarda Quintero, PT

## 2024-05-13 ENCOUNTER — CLINICAL SUPPORT (OUTPATIENT)
Dept: REHABILITATION | Facility: HOSPITAL | Age: 29
End: 2024-05-13
Payer: COMMERCIAL

## 2024-05-13 DIAGNOSIS — R53.1 DECREASED STRENGTH: ICD-10-CM

## 2024-05-13 DIAGNOSIS — R26.9 GAIT ABNORMALITY: ICD-10-CM

## 2024-05-13 DIAGNOSIS — R68.89 DECREASED FUNCTIONAL ACTIVITY TOLERANCE: Primary | ICD-10-CM

## 2024-05-13 DIAGNOSIS — R26.89 BALANCE PROBLEM: ICD-10-CM

## 2024-05-13 PROCEDURE — 97112 NEUROMUSCULAR REEDUCATION: CPT

## 2024-05-13 PROCEDURE — 97110 THERAPEUTIC EXERCISES: CPT

## 2024-05-13 NOTE — PROGRESS NOTES
[facility]   Physical Therapy Treatment Note /plan of care update       Name: Swati Durán  Clinic Number: [id number]    Therapy Diagnosis:   Encounter Diagnoses   Name Primary?    Decreased functional activity tolerance Yes    Gait abnormality     Balance problem     Decreased strength        Physician: Dayanna Morelos DPM    Visit Date: 5/13/2024    Physician Orders: PT Eval and Treat  Medical Diagnosis from Referral: Sprain of anterior talofibular ligament of left ankle, initial encounter; Pain of joint of left ankle and foot  Evaluation Date: 3/19/2024  Authorization Period Expiration: 12/31/24  Plan of Care Expiration: 7/13/2024  Progress Note Due: 6/13/2024  Visit # / Visits authorized: 13/20 (+1)  FOTO: 3/3 (last performed on 5/13/2024)       Precautions: Standard  PTA Visit #: 0/5     Time In: 3:00 pm  Time Out: 3:55 pm  Total Billable Time: 53 minutes      SUBJECTIVE     Pt reports: she tried a roundoff on spring floor - no pain but she did feel a little nervous. She was able to run a mile  without pain.    She was compliant with home exercise program.  Response to previous treatment: no change  Functional change: no change    Pain: NT/10  Location: left ankle pain along medial post tibialis area and lateral calcaneus      OBJECTIVE     Objective Measures updated at progress report unless specified.          Balance  Right   (seconds) Left  (seconds) Norms Left  5/1/24 5/13/24   Single Leg Stance 30 5 Less than 4.9 sec high risk  5-6.4 sec increased risk  6.5 or greater low risk 20 sec 30 sec        Range of Motion:     Ankle/Foot AROM/PROM Right Left 5/1/24  Left  5/13/24   Dorsiflexion (20º) 10 -5  10 10   Plantarflexion (50º) 60 40 52 60   Inversion (35º) 55 60 45 50   Eversion (15º) 25 15 20 20   Great Toe Extension (70º) 60 65  NT NT      Strength:     L/E MMT Right  (spine) Left Pain/Dysfunction with Movement 5/1/24  Left  5/13/24   Hip Flexion  4/5 4/5 + trunk  shift NT 4+/5   Knee Extension 4/5 4/5 + trunk shift NT 4+/5   Knee Flexion 4/5 4/5 + trunk shift NT 4+/5   Hip IR 4/5 4/5 + trunk shift NT 4+/5   Hip ER 4/5 4/5 + trunk shift NT 4+/5   Ankle DF 4/5 4-/5   5/5 NT   Ankle PF 4/5 4-/5   5/5 NT   Ankle Inversion 4-/5 3+/5 Pain on left  4+/5-5/5    4+/5-5/5   Ankle Eversion 4-/5 3+/5 Pain on left  4+/5  4+/5-5/5      5/1/24: 14 heel raises single leg.  5/13/24: 20x     FOTO:        TREATMENT       Swati received the treatments listed below:       CPT Intervention Duration / Intensity  5/13/24   MT DF mobilizations; fibular glides superior; left calf muscle's & post tibialis 5 min   TE Upright Bike  8 min    TE Bridge (progress to SL bridge)      TE FOTO/Re-assess 15 min   NMR STAND ankle DF/PF with BFR Reps: 30, 15, 15, 15  (75 total) with 30s rest between reps    NMR Supine ankle inversion/eversion with BFR Reps: 30, 15, 15, 15  (75 total) with 30s rest between reps    NMR MOBU Board  evens with BFR Reps: 30, 15, 15, 15  (75 total) with 30s rest between reps    NMR Toe yoga      NMR Sidelying hip       NMR Squats with arch control       NMR Step downs       NMR Doming (standing)       NMR Single leg stance + kickstand (focus on arch control)                             TA Standing dynamic balance      TA BOSU/airex squats      TA sidestepping band at feet      TA  heel raises on leg press      TA Jumping on shuttle                             PLAN          CPT Codes available for Billing:   (05) minutes of Manual therapy (MT) to improve pain and ROM.  (23) minutes of Therapeutic Exercise (TE) to develop strength, endurance, range of motion, and flexibility.  (24) minutes of Neuromuscular Re-Education (NMR)  to improve: Balance, Coordination, Kinesthetic, Sense, Proprioception, and Posture.  (00) minutes of Therapeutic Activities (TA) to improve functional performance.  Unattended Electrical Stimulation (ES) for muscle performance or pain modulation.  BFR: Blood flow  restriction applied during exercise  NP or (-): Not Performed          Plan for Next Visit: move to 1 visit BFR, 1 visit standing activities - SL balance, dynamic balance activities with SLB, , hip thrusters, sidelying hip series.        PATIENT EDUCATION AND HOME EXERCISES     Home Exercises Provided and Patient Education Provided     Education provided:   - home exercise program     Written Home Exercises Provided: Patient instructed to cont prior HEP. Exercises were reviewed and Swati was able to demonstrate them prior to the end of the session.  Swati demonstrated good  understanding of the education provided. See EMR under Patient Instructions for exercises provided during therapy sessions      ASSESSMENT     Patient has made excellent progress with all objective measurements and is reporting decreased pain and overall improved function verbally and per FOTO. Patient has not yet returned to all prior level activities and should benefit from continued treatment to address remaining goals and return patient to prior level of function.    Swati Is progressing well towards her goals.   Pt prognosis is Good.     Pt will continue to benefit from skilled outpatient physical therapy to address the deficits listed in the problem list box on initial evaluation, provide pt/family education and to maximize pt's level of independence in the home and community environment.     Pt's spiritual, cultural and educational needs considered and pt agreeable to plan of care and goals.     Anticipated barriers to physical therapy:  co-morbidities, chronicity of condition, adherence to treatment plan, financial limitations, occupation, and coping style     Goals:   Reviewed: 5/13/2024       Short Term Goals: In 4 weeks  Progress Date   1.Patient to be educated on HEP. [x] Progressing  [] MET   [] Not MET   [] Not tested  5/1/2024   2.Patient to increase left ankle range of motion, in order to improve available range of motion  for ADL's.  [] Progressing  [x] MET   [] Not MET  [] Not tested  5/13/2024   3.Patient to increase bilateral LE strength by 1/2 grade, in order to improve endurance and increase ability to perform all functional activities for increased time.  [] Progressing  [x] MET   [] Not MET  [] Not tested  5/1/2024   4.Patient to have pain less than 5/10 at worst, to improve QOL. [] Progressing  [x] MET   [] Not MET  [] Not tested  5/1/2024   5.Patient to improve score on the FOTO, to improve QOL. [] Progressing  [x] MET   [] Not MET  [] Not tested  5/1/2024   6. Patient to improve score on single leg balance in order to decrease fall risk. [] Progressing  [x] MET   [] Not MET  [] Not tested  5/1/2024      Long Term Goals: In 8 weeks Progress Date   1.Patient to improve score on the FOTO predicted score or better, to improve QOL. [] Progressing  [x] MET   [] Not MET  [] Not tested  5/1/2024   2. Patient to increase bilateral LE strength to 5/5 or greater, in order to improve endurance and increase ability to perform all functional activities for increased time. [x] Progressing  [] MET   [] Not MET  [] Not tested  5/13/2024   3. Patient to have decreased pain to 2/10 at worst, to improve QOL. [] Progressing  [x] MET   [] Not MET  [] Not tested  5/13/2024   4. Patient to normalize score on single leg balance, in order to improve endurance and decrease fall risk. [] Progressing  [x] MET   [] Not MET  [] Not tested  5/13/2024   5. Patient to perform daily activities including tumbling and dance without increased symptoms. [x] Progressing  [] MET   [] Not MET  [] Not tested  5/13/2024        PLAN     Monitor response to today's treatment session and progress with Physical Therapy plan of care as indicated.    Updated Certification Period: 5/13/24 to 7/13/24   Recommended Treatment Plan: 1-2 times per week for 8 weeks:  Aquatic Therapy, Electrical Stimulation PRN, FDN/PRN, Gait Training, Manual Therapy, Moist Heat/ Ice,  Neuromuscular Re-ed, Patient Education, Self Care, Therapeutic Activities, and Therapeutic Exercise    Ricarda Quintero, PT

## 2024-05-15 NOTE — PLAN OF CARE
[facility]  Physical Therapy Plan of Care Note       Name: Swati Durán  [employer] Number: [id number]    Therapy Diagnosis:   Encounter Diagnoses   Name Primary?    Decreased functional activity tolerance Yes    Gait abnormality     Balance problem     Decreased strength      Physician: Dayanna Morelos DPM    Visit Date: 5/13/2024    Physician Orders: PT Eval and Treat  Medical Diagnosis from Referral: Sprain of anterior talofibular ligament of left ankle, initial encounter; Pain of joint of left ankle and foot  Evaluation Date: 3/19/2024  Authorization Period Expiration: 12/31/24  Plan of Care Expiration: 5/19/2024  Progress Note Due: 5/19/2024  Visit # / Visits authorized: 13/20 (+1)  FOTO: 2/3 (last performed on 5/1/2024)    Precautions: Standard   Functional Level Prior to Evaluation:  see evaluation    SUBJECTIVE     Update: see daily note    OBJECTIVE     Update: see daily note    ASSESSMENT     Update: see daily note    Previous Short Term Goals Status:   see daily note  New Short Term Goals Status:   see daily note  Long Term Goal Status: continue per initial plan of care.  Reasons for Recertification of Therapy:   see daily note    GOALS  see daily note    PLAN     Updated Certification Period: 5/13/24 to 7/13/24   Recommended Treatment Plan: 1-2 times per week for 8 weeks:  Aquatic Therapy, Electrical Stimulation PRN, FDN/PRN, Gait Training, Manual Therapy, Moist Heat/ Ice, Neuromuscular Re-ed, Patient Education, Self Care, Therapeutic Activities, and Therapeutic Exercise  Other Recommendations: None    Ricarda Quintero PT      Physician's Signature: ___________________________________________________

## 2024-05-16 ENCOUNTER — CLINICAL SUPPORT (OUTPATIENT)
Dept: REHABILITATION | Facility: HOSPITAL | Age: 29
End: 2024-05-16
Payer: COMMERCIAL

## 2024-05-16 DIAGNOSIS — R26.89 BALANCE PROBLEM: ICD-10-CM

## 2024-05-16 DIAGNOSIS — R53.1 DECREASED STRENGTH: ICD-10-CM

## 2024-05-16 DIAGNOSIS — R68.89 DECREASED FUNCTIONAL ACTIVITY TOLERANCE: Primary | ICD-10-CM

## 2024-05-16 DIAGNOSIS — R26.9 GAIT ABNORMALITY: ICD-10-CM

## 2024-05-16 PROCEDURE — 97112 NEUROMUSCULAR REEDUCATION: CPT

## 2024-05-16 PROCEDURE — 97140 MANUAL THERAPY 1/> REGIONS: CPT

## 2024-05-16 PROCEDURE — 97530 THERAPEUTIC ACTIVITIES: CPT

## 2024-05-17 NOTE — PROGRESS NOTES
[facility]   Physical Therapy Treatment Note       Name: Swati Durán  Clinic Number: [id number]    Therapy Diagnosis:   Encounter Diagnoses   Name Primary?    Decreased functional activity tolerance Yes    Gait abnormality     Balance problem     Decreased strength        Physician: Dayanna Morelos DPM    Visit Date: 5/16/2024    Physician Orders: PT Eval and Treat  Medical Diagnosis from Referral: Sprain of anterior talofibular ligament of left ankle, initial encounter; Pain of joint of left ankle and foot  Evaluation Date: 3/19/2024  Authorization Period Expiration: 12/31/24  Plan of Care Expiration: 7/13/2024  Progress Note Due: 6/13/2024  Visit # / Visits authorized: 14/20 (+1)  FOTO: 3/3 (last performed on 5/13/2024)       Precautions: Standard  PTA Visit #: 0/5     Time In: 1100  Time Out: 1203  Total Billable Time: 54 minutes      SUBJECTIVE     Pt reports: she is feeling good today. States she has been feeling better for the most part but notes that she gets sore by the end of the day. States she was able to start doing roundoffs with no pain.     She was compliant with home exercise program.  Response to previous treatment: no change  Functional change: no change    Pain: NT/10  Location: left ankle pain along medial post tibialis area and lateral calcaneus      OBJECTIVE     Objective Measures updated at progress report unless specified.       TREATMENT       Swati received the treatments listed below:       CPT Intervention Duration / Intensity  5/17/24   MT DF mobilizations; fibular glides superior; left calf muscle's & post tibialis 8 min   TE Upright Bike  5 min     Bridge (progress to SL bridge)       FOTO/Re-assess 20   NMR STAND ankle DF/PF with BFR     NMR Supine ankle inversion/eversion with BFR     NMR MOBU Board  evens with BFR     NMR Toe yoga     NMR Side lying hip      NMR Squats with arch control      NMR Step downs      NMR Doming (standing)  B  stance: 1 min x 5s holds   Staggered stance: 90s x 5s holds b    NMR Single leg stance + kickstand + around the world  5# 2x10 each way, b    TA Standing dynamic balance     TA BOSU/airex squats 3x10    TA Side stepping on toes Red band, 4 laps    TA Heel raises on leg press     TA Jumping on shuttle     TA Line hopping  30s each   Lateral  Forward  4 square- each way   TA Single leg hopping  3x10 b    TA Depth step drops 6 inch, 2x10 b            PLAN Band assisted  hopping   Line hops      Lateral toe walks   Weighted calf raise         CPT Codes available for Billing:   (08) minutes of Manual therapy (MT) to improve pain and ROM.  (05) minutes of Therapeutic Exercise (TE) to develop strength, endurance, range of motion, and flexibility.  (10) minutes of Neuromuscular Re-Education (NMR)  to improve: Balance, Coordination, Kinesthetic, Sense, Proprioception, and Posture.  (31) minutes of Therapeutic Activities (TA) to improve functional performance.  Unattended Electrical Stimulation (ES) for muscle performance or pain modulation.  BFR: Blood flow restriction applied during exercise  NP or (-): Not Performed          Plan for Next Visit: move to 1 visit BFR, 1 visit standing activities - SL balance, dynamic balance activities with SLB, , hip thrusters, sidelying hip series.        PATIENT EDUCATION AND HOME EXERCISES     Home Exercises Provided and Patient Education Provided     Education provided:   - home exercise program     Written Home Exercises Provided: Patient instructed to cont prior HEP. Exercises were reviewed and Swati was able to demonstrate them prior to the end of the session.  Swati demonstrated good  understanding of the education provided. See EMR under Patient Instructions for exercises provided during therapy sessions      ASSESSMENT     Pt tolerated session well today. Incorporated line hopping and single leg hopping; pt notes no discomfort but notes muscle tension in the front of the  ankle. Added depth step drops to improve landing control. Pt requires cueing initially to reduce knee valgus and foot pronation but is able to maintain good form throughout remainder of intervention.     Swati Is progressing well towards her goals.   Pt prognosis is Good.     Pt will continue to benefit from skilled outpatient physical therapy to address the deficits listed in the problem list box on initial evaluation, provide pt/family education and to maximize pt's level of independence in the home and community environment.     Pt's spiritual, cultural and educational needs considered and pt agreeable to plan of care and goals.     Anticipated barriers to physical therapy:  co-morbidities, chronicity of condition, adherence to treatment plan, financial limitations, occupation, and coping style     Goals:   Reviewed: 5/16/2024       Short Term Goals: In 4 weeks  Progress Date   1.Patient to be educated on HEP. [x] Progressing  [] MET   [] Not MET   [] Not tested  5/1/2024   2.Patient to increase left ankle range of motion, in order to improve available range of motion for ADL's.  [] Progressing  [x] MET   [] Not MET  [] Not tested  5/13/2024   3.Patient to increase bilateral LE strength by 1/2 grade, in order to improve endurance and increase ability to perform all functional activities for increased time.  [] Progressing  [x] MET   [] Not MET  [] Not tested  5/1/2024   4.Patient to have pain less than 5/10 at worst, to improve QOL. [] Progressing  [x] MET   [] Not MET  [] Not tested  5/1/2024   5.Patient to improve score on the FOTO, to improve QOL. [] Progressing  [x] MET   [] Not MET  [] Not tested  5/1/2024   6. Patient to improve score on single leg balance in order to decrease fall risk. [] Progressing  [x] MET   [] Not MET  [] Not tested  5/1/2024      Long Term Goals: In 8 weeks Progress Date   1.Patient to improve score on the FOTO predicted score or better, to improve QOL. [] Progressing  [x] MET   []  Not MET  [] Not tested  5/1/2024   2. Patient to increase bilateral LE strength to 5/5 or greater, in order to improve endurance and increase ability to perform all functional activities for increased time. [x] Progressing  [] MET   [] Not MET  [] Not tested  5/13/2024   3. Patient to have decreased pain to 2/10 at worst, to improve QOL. [] Progressing  [x] MET   [] Not MET  [] Not tested  5/13/2024   4. Patient to normalize score on single leg balance, in order to improve endurance and decrease fall risk. [] Progressing  [x] MET   [] Not MET  [] Not tested  5/13/2024   5. Patient to perform daily activities including tumbling and dance without increased symptoms. [x] Progressing  [] MET   [] Not MET  [] Not tested  5/13/2024        PLAN     Monitor response to today's treatment session and progress with Physical Therapy plan of care as indicated.    Updated Certification Period: 5/13/24 to 7/13/24   Recommended Treatment Plan: 1-2 times per week for 8 weeks:  Aquatic Therapy, Electrical Stimulation PRN, FDN/PRN, Gait Training, Manual Therapy, Moist Heat/ Ice, Neuromuscular Re-ed, Patient Education, Self Care, Therapeutic Activities, and Therapeutic Exercise    Tara Maki, PT

## 2024-05-20 ENCOUNTER — CLINICAL SUPPORT (OUTPATIENT)
Dept: REHABILITATION | Facility: HOSPITAL | Age: 29
End: 2024-05-20
Payer: COMMERCIAL

## 2024-05-20 DIAGNOSIS — R26.9 GAIT ABNORMALITY: ICD-10-CM

## 2024-05-20 DIAGNOSIS — R53.1 DECREASED STRENGTH: ICD-10-CM

## 2024-05-20 DIAGNOSIS — R26.89 BALANCE PROBLEM: ICD-10-CM

## 2024-05-20 DIAGNOSIS — R68.89 DECREASED FUNCTIONAL ACTIVITY TOLERANCE: Primary | ICD-10-CM

## 2024-05-20 PROCEDURE — 97110 THERAPEUTIC EXERCISES: CPT

## 2024-05-20 PROCEDURE — 97112 NEUROMUSCULAR REEDUCATION: CPT

## 2024-05-20 PROCEDURE — 97530 THERAPEUTIC ACTIVITIES: CPT

## 2024-05-21 NOTE — PROGRESS NOTES
[facility]   Physical Therapy Treatment Note       Name: Swati Durán  Clinic Number: [id number]    Therapy Diagnosis:   Encounter Diagnoses   Name Primary?    Decreased functional activity tolerance Yes    Gait abnormality     Balance problem     Decreased strength        Physician: Dayanna Morelos DPM    Visit Date: 5/20/2024    Physician Orders: PT Eval and Treat  Medical Diagnosis from Referral: Sprain of anterior talofibular ligament of left ankle, initial encounter; Pain of joint of left ankle and foot  Evaluation Date: 3/19/2024  Authorization Period Expiration: 12/31/24  Plan of Care Expiration: 7/13/2024  Progress Note Due: 6/13/2024  Visit # / Visits authorized: 15/20 (+1)  FOTO: 3/3 (last performed on 5/13/2024)       Precautions: Standard  PTA Visit #: 0/5     Time In: 1558  Time Out: 1656  Total Billable Time: 56 minutes      SUBJECTIVE     Pt reports: she is feeling good today with no significant symptoms. States she remembers having some muscular soreness following previous session but no pain    She was compliant with home exercise program.  Response to previous treatment: no change  Functional change: no change    Pain: NT/10  Location: left ankle pain along medial post tibialis area and lateral calcaneus      OBJECTIVE     Objective Measures updated at progress report unless specified.       TREATMENT       Swati received the treatments listed below:       CPT Intervention Duration / Intensity  Performed Today   MT DF mobilizations; fibular glides superior; left calf muscle's & post tibialis    TE Upright Bike  Level 8 for 5 min     Bridge (progress to SL bridge)     NMR BFR: 80% occlusion, BLUE cuff, 30/15/15/15 repetitions  STANDING ankle DF/PF  Supine ankle inversion/eversion  HERNANDEZU Board - evens   NMR Toe yoga     NMR Side lying hip      NMR Squats with arch control      NMR Step downs      NMR Doming (standing)  B stance: 1 min x 5s holds   Staggered  stance: 90s x 5s holds b    NMR Single leg stance + kickstand + around the world  5# 2x10 each way, b    TA Standing dynamic balance     TA BOSU/airex squats     TA Side stepping on toes Red band, 4 laps    TA Heel raises on leg press     TA Jumping on shuttle     TA Single leg hopping  3x10 b    TA Single leg depth drops 6 inch, 2x10 b            PLAN Band assisted  hopping   Line hops      Lateral toe walks   Weighted calf raise         CPT Codes available for Billing:   (00) minutes of Manual therapy (MT) to improve pain and ROM.  (08) minutes of Therapeutic Exercise (TE) to develop strength, endurance, range of motion, and flexibility.  (30) minutes of Neuromuscular Re-Education (NMR)  to improve: Balance, Coordination, Kinesthetic, Sense, Proprioception, and Posture.  (18) minutes of Therapeutic Activities (TA) to improve functional performance.  Unattended Electrical Stimulation (ES) for muscle performance or pain modulation.  BFR: Blood flow restriction applied during exercise  NP or (-): Not Performed          Plan for Next Visit: move to 1 visit BFR, 1 visit standing activities - SL balance, dynamic balance activities with SLB, , hip thrusters, sidelying hip series.        PATIENT EDUCATION AND HOME EXERCISES     Home Exercises Provided and Patient Education Provided     Education provided:   - home exercise program     Written Home Exercises Provided: Patient instructed to cont prior HEP. Exercises were reviewed and Swati was able to demonstrate them prior to the end of the session.  Swati demonstrated good  understanding of the education provided. See EMR under Patient Instructions for exercises provided during therapy sessions      ASSESSMENT     Patient tolerated session well today. Reports some muscular tension on the right ankle with single leg depth drops but has no significant pain on either leg. Significant improved control noted with landing during depth drops today. Blood flow restriction  incorporated today after being cleared for all contradictions. Intervention performed in order to improve strength gains with lower load interventions. Pt tolerated intervention well with only reports of significant muscle fatigue but no pain throughout.     Swati Is progressing well towards her goals.   Pt prognosis is Good.     Pt will continue to benefit from skilled outpatient physical therapy to address the deficits listed in the problem list box on initial evaluation, provide pt/family education and to maximize pt's level of independence in the home and community environment.     Pt's spiritual, cultural and educational needs considered and pt agreeable to plan of care and goals.     Anticipated barriers to physical therapy:  co-morbidities, chronicity of condition, adherence to treatment plan, financial limitations, occupation, and coping style     Goals:   Reviewed: 5/20/2024       Short Term Goals: In 4 weeks  Progress Date   1.Patient to be educated on HEP. [x] Progressing  [] MET   [] Not MET   [] Not tested  5/1/2024   2.Patient to increase left ankle range of motion, in order to improve available range of motion for ADL's.  [] Progressing  [x] MET   [] Not MET  [] Not tested  5/13/2024   3.Patient to increase bilateral LE strength by 1/2 grade, in order to improve endurance and increase ability to perform all functional activities for increased time.  [] Progressing  [x] MET   [] Not MET  [] Not tested  5/1/2024   4.Patient to have pain less than 5/10 at worst, to improve QOL. [] Progressing  [x] MET   [] Not MET  [] Not tested  5/1/2024   5.Patient to improve score on the FOTO, to improve QOL. [] Progressing  [x] MET   [] Not MET  [] Not tested  5/1/2024   6. Patient to improve score on single leg balance in order to decrease fall risk. [] Progressing  [x] MET   [] Not MET  [] Not tested  5/1/2024      Long Term Goals: In 8 weeks Progress Date   1.Patient to improve score on the FOTO predicted score  or better, to improve QOL. [] Progressing  [x] MET   [] Not MET  [] Not tested  5/1/2024   2. Patient to increase bilateral LE strength to 5/5 or greater, in order to improve endurance and increase ability to perform all functional activities for increased time. [x] Progressing  [] MET   [] Not MET  [] Not tested  5/13/2024   3. Patient to have decreased pain to 2/10 at worst, to improve QOL. [] Progressing  [x] MET   [] Not MET  [] Not tested  5/13/2024   4. Patient to normalize score on single leg balance, in order to improve endurance and decrease fall risk. [] Progressing  [x] MET   [] Not MET  [] Not tested  5/13/2024   5. Patient to perform daily activities including tumbling and dance without increased symptoms. [x] Progressing  [] MET   [] Not MET  [] Not tested  5/13/2024        PLAN     Monitor response to today's treatment session and progress with Physical Therapy plan of care as indicated.    Updated Certification Period: 5/13/24 to 7/13/24   Recommended Treatment Plan: 1-2 times per week for 8 weeks:  Aquatic Therapy, Electrical Stimulation PRN, FDN/PRN, Gait Training, Manual Therapy, Moist Heat/ Ice, Neuromuscular Re-ed, Patient Education, Self Care, Therapeutic Activities, and Therapeutic Exercise    Tara Maki, PT

## 2024-05-22 ENCOUNTER — CLINICAL SUPPORT (OUTPATIENT)
Dept: REHABILITATION | Facility: HOSPITAL | Age: 29
End: 2024-05-22
Payer: COMMERCIAL

## 2024-05-22 DIAGNOSIS — R53.1 DECREASED STRENGTH: ICD-10-CM

## 2024-05-22 DIAGNOSIS — R68.89 DECREASED FUNCTIONAL ACTIVITY TOLERANCE: Primary | ICD-10-CM

## 2024-05-22 DIAGNOSIS — R26.89 BALANCE PROBLEM: ICD-10-CM

## 2024-05-22 DIAGNOSIS — R26.9 GAIT ABNORMALITY: ICD-10-CM

## 2024-05-22 PROCEDURE — 97110 THERAPEUTIC EXERCISES: CPT

## 2024-05-22 PROCEDURE — 97530 THERAPEUTIC ACTIVITIES: CPT

## 2024-05-22 PROCEDURE — 97140 MANUAL THERAPY 1/> REGIONS: CPT

## 2024-05-23 ENCOUNTER — OFFICE VISIT (OUTPATIENT)
Dept: PODIATRY | Facility: CLINIC | Age: 29
End: 2024-05-23
Payer: COMMERCIAL

## 2024-05-23 VITALS — HEIGHT: 63 IN | WEIGHT: 156.31 LBS | BODY MASS INDEX: 27.7 KG/M2

## 2024-05-23 DIAGNOSIS — M25.572 PAIN OF JOINT OF LEFT ANKLE AND FOOT: ICD-10-CM

## 2024-05-23 DIAGNOSIS — S93.492S SPRAIN OF ANTERIOR TALOFIBULAR LIGAMENT OF LEFT ANKLE, SEQUELA: Primary | ICD-10-CM

## 2024-05-23 PROCEDURE — 99999 PR PBB SHADOW E&M-EST. PATIENT-LVL III: CPT | Mod: PBBFAC,,, | Performed by: PODIATRIST

## 2024-05-23 PROCEDURE — 99213 OFFICE O/P EST LOW 20 MIN: CPT | Mod: S$GLB,,, | Performed by: PODIATRIST

## 2024-05-23 PROCEDURE — 3008F BODY MASS INDEX DOCD: CPT | Mod: CPTII,S$GLB,, | Performed by: PODIATRIST

## 2024-05-23 PROCEDURE — 1159F MED LIST DOCD IN RCRD: CPT | Mod: CPTII,S$GLB,, | Performed by: PODIATRIST

## 2024-05-23 PROCEDURE — 1160F RVW MEDS BY RX/DR IN RCRD: CPT | Mod: CPTII,S$GLB,, | Performed by: PODIATRIST

## 2024-05-23 NOTE — PROGRESS NOTES
[facility]   Physical Therapy Treatment Note       Name: Swati Durán  Clinic Number: [id number]    Therapy Diagnosis:   Encounter Diagnoses   Name Primary?    Decreased functional activity tolerance Yes    Gait abnormality     Balance problem     Decreased strength        Physician: Dayanna Morelos DPM    Visit Date: 5/22/2024    Physician Orders: PT Eval and Treat  Medical Diagnosis from Referral: Sprain of anterior talofibular ligament of left ankle, initial encounter; Pain of joint of left ankle and foot  Evaluation Date: 3/19/2024  Authorization Period Expiration: 12/31/24  Plan of Care Expiration: 7/13/2024  Progress Note Due: 6/13/2024  Visit # / Visits authorized: 16/20 (+1)  FOTO: 3/3 (last performed on 5/13/2024)       Precautions: Standard  PTA Visit #: 0/5     Time In: 1500  Time Out: 1604  Total Billable Time: 56 minutes      SUBJECTIVE     Pt reports: she is feeling pretty good with no significant complaints. Notes that she felt good following previous session and did not notice any soreness or pain     She was compliant with home exercise program.  Response to previous treatment: no change  Functional change: no change    Pain: NT/10  Location: left ankle pain along medial post tibialis area and lateral calcaneus      OBJECTIVE     Objective Measures updated at progress report unless specified.       TREATMENT       Swati received the treatments listed below:       CPT Intervention Duration / Intensity  Performed Today   MT Soft tissue  Gastroc, soleus, posterior tibialis, peroneals    TE Upright Bike  Level 8 for 8 min   TE Dynamic stretches 1 lap each: kicks, knee grabs, quad grabs, sweeps    NMR BFR: 80% occlusion, BLUE cuff, 30/15/15/15 repetitions      NMR Toe yoga     NMR Side lying hip      NMR Squats with arch control      NMR Step downs      NMR Doming (standing)      NMR Single leg stance + around the world  5# 3x10 each way, b    TA Standing dynamic  balance     TA BOSU squats 10# 3x10    TA Side stepping on toes Red band, 4 laps    TA Heel raises- stairs  3x10   TA Anterior step down to reverse lunge  4 inch, 2x10 b    TA Single leg hopping  3x10 b    TA Single leg depth drops 6 inch, 2x10 b    TA Squat jumps  2x10    TA Lateral depth drop with rebound  6 inch 2x10 b    PLAN          CPT Codes available for Billing:   (08) minutes of Manual therapy (MT) to improve pain and ROM.  (10) minutes of Therapeutic Exercise (TE) to develop strength, endurance, range of motion, and flexibility.  (08) minutes of Neuromuscular Re-Education (NMR)  to improve: Balance, Coordination, Kinesthetic, Sense, Proprioception, and Posture.  (28) minutes of Therapeutic Activities (TA) to improve functional performance.  Unattended Electrical Stimulation (ES) for muscle performance or pain modulation.  BFR: Blood flow restriction applied during exercise  NP or (-): Not Performed          Plan for Next Visit: move to 1 visit BFR, 1 visit standing activities - SL balance, dynamic balance activities with SLB, , hip thrusters, sidelying hip series.        PATIENT EDUCATION AND HOME EXERCISES     Home Exercises Provided and Patient Education Provided     Education provided:   - home exercise program     Written Home Exercises Provided: Patient instructed to cont prior HEP. Exercises were reviewed and Swati was able to demonstrate them prior to the end of the session.  Swati demonstrated good  understanding of the education provided. See EMR under Patient Instructions for exercises provided during therapy sessions      ASSESSMENT     Patient tolerated session well today. Incorporated jump squats and lateral depth drops with rebound to improve stability with landing; pt demonstrates good form and reports no adverse symptoms. Added anterior step down to reverse lunge to improve stability and strength with dynamic movement; pt initially reports medial ankle discomfort but notes that pain  subsides after being instructed to dome her foot to create arch stability.     Swati Is progressing well towards her goals.   Pt prognosis is Good.     Pt will continue to benefit from skilled outpatient physical therapy to address the deficits listed in the problem list box on initial evaluation, provide pt/family education and to maximize pt's level of independence in the home and community environment.     Pt's spiritual, cultural and educational needs considered and pt agreeable to plan of care and goals.     Anticipated barriers to physical therapy:  co-morbidities, chronicity of condition, adherence to treatment plan, financial limitations, occupation, and coping style     Goals:   Reviewed: 5/22/2024       Short Term Goals: In 4 weeks  Progress Date   1.Patient to be educated on HEP. [x] Progressing  [] MET   [] Not MET   [] Not tested  5/1/2024   2.Patient to increase left ankle range of motion, in order to improve available range of motion for ADL's.  [] Progressing  [x] MET   [] Not MET  [] Not tested  5/13/2024   3.Patient to increase bilateral LE strength by 1/2 grade, in order to improve endurance and increase ability to perform all functional activities for increased time.  [] Progressing  [x] MET   [] Not MET  [] Not tested  5/1/2024   4.Patient to have pain less than 5/10 at worst, to improve QOL. [] Progressing  [x] MET   [] Not MET  [] Not tested  5/1/2024   5.Patient to improve score on the FOTO, to improve QOL. [] Progressing  [x] MET   [] Not MET  [] Not tested  5/1/2024   6. Patient to improve score on single leg balance in order to decrease fall risk. [] Progressing  [x] MET   [] Not MET  [] Not tested  5/1/2024      Long Term Goals: In 8 weeks Progress Date   1.Patient to improve score on the FOTO predicted score or better, to improve QOL. [] Progressing  [x] MET   [] Not MET  [] Not tested  5/1/2024   2. Patient to increase bilateral LE strength to 5/5 or greater, in order to improve  endurance and increase ability to perform all functional activities for increased time. [x] Progressing  [] MET   [] Not MET  [] Not tested  5/13/2024   3. Patient to have decreased pain to 2/10 at worst, to improve QOL. [] Progressing  [x] MET   [] Not MET  [] Not tested  5/13/2024   4. Patient to normalize score on single leg balance, in order to improve endurance and decrease fall risk. [] Progressing  [x] MET   [] Not MET  [] Not tested  5/13/2024   5. Patient to perform daily activities including tumbling and dance without increased symptoms. [x] Progressing  [] MET   [] Not MET  [] Not tested  5/13/2024        PLAN     Monitor response to today's treatment session and progress with Physical Therapy plan of care as indicated.    Updated Certification Period: 5/13/24 to 7/13/24   Recommended Treatment Plan: 1-2 times per week for 8 weeks:  Aquatic Therapy, Electrical Stimulation PRN, FDN/PRN, Gait Training, Manual Therapy, Moist Heat/ Ice, Neuromuscular Re-ed, Patient Education, Self Care, Therapeutic Activities, and Therapeutic Exercise    Tara Maki, PT

## 2024-05-23 NOTE — PROGRESS NOTES
Subjective:     Patient ID: Swati Durán is a 28 y.o. female.    Chief Complaint: Ankle Pain (F/u on ankle pain, no pain, nondiabetic, pt is wearing slippers)    Swati is a 28 y.o. female who presents to the podiatry clinic for follow up of left foot pain. Patient states pain is better but after therapy there is discomfort. Patient states she does physical therapy 2 times a week. Patient points to left medal and lateral ankle. Patient has no other pedal complaints at this time.     Patient Active Problem List   Diagnosis    Chronic dislocation of right shoulder    Myofacial muscle pain    Rotator cuff tendinitis    Winged scapula of right side    Recurrent subluxation of shoulder    Instability of right shoulder joint    Goiter    Moderate left ankle sprain    Overweight (BMI 25.0-29.9)    Decreased functional activity tolerance    Gait abnormality    Balance problem    Decreased strength       Medication List with Changes/Refills   Current Medications    IBUPROFEN (ADVIL,MOTRIN) 800 MG TABLET    Take 1 tablet (800 mg total) by mouth every 6 (six) hours as needed for Pain.    TRAMADOL (ULTRAM) 50 MG TABLET    Take 1 tablet (50 mg total) by mouth every 6 (six) hours as needed for Pain.       Review of patient's allergies indicates:   Allergen Reactions    Food allergy formula  [glutamine-[id number]-selen-brom] Hives       Past Surgical History:   Procedure Laterality Date    None         Family History   Problem Relation Name Age of Onset    Hypertension Mother      Other Mother          borderline diabetes    No Known Problems Father      No Known Problems Sister      No Known Problems Brother      Breast cancer Maternal Grandmother      Other Maternal Grandmother          borderline diabetes    Coronary artery disease Maternal Grandmother      Lung cancer Paternal Grandfather      Stroke Neg Hx         Social History     Socioeconomic History    Marital status: Single   Tobacco Use    Smoking status:  "Never    Smokeless tobacco: Never   Substance and Sexual Activity    Alcohol use: Yes     Alcohol/week: 0.0 standard drinks of alcohol     Comment: Occasionally    Drug use: No    Sexual activity: Yes     Partners: Male     Birth control/protection: Condom   Social History Narrative    She wear seatbelt. 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     Social Determinants of Health     Financial Resource Strain: Unknown (10/11/2021)    Overall Financial Resource Strain (CARDIA)     Difficulty of Paying Living Expenses: Patient declined   Food Insecurity: No Food Insecurity (10/11/2021)    Hunger Vital Sign     Worried About Running Out of Food in the Last Year: Never true     Ran Out of Food in the Last Year: Never true   Transportation Needs: No Transportation Needs (10/11/2021)    PRAPARE - Transportation     Lack of Transportation (Medical): No     Lack of Transportation (Non-Medical): No   Physical Activity: Inactive (12/29/2023)    Exercise Vital Sign     Days of Exercise per Week: 0 days     Minutes of Exercise per Session: 0 min   Stress: Stress Concern Present (10/11/2021)    [place] [employer] - Occupational Stress Questionnaire     Feeling of Stress : To some extent   Housing Stability: Low Risk  (10/11/2021)    Housing Stability Vital Sign     Unable to Pay for Housing in the Last Year: No     Number of Places Lived in the Last Year: 1     Unstable Housing in the Last Year: No       Vitals:    05/23/24 1553   Weight: 70.9 kg (156 lb 4.9 oz)   Height: 5' 3" (1.6 m)       Review of Systems   Constitutional:  Negative for chills and fever.   Respiratory:  Negative for shortness of breath.    Cardiovascular:  Negative for chest pain, palpitations, orthopnea, claudication and leg swelling.   Gastrointestinal:  Negative for diarrhea, nausea and vomiting.   Musculoskeletal:  Negative for joint pain.   Skin:  Negative for rash.   Neurological:  Negative for dizziness, tingling, sensory change, focal " weakness and weakness.   Psychiatric/Behavioral: Negative.             Objective:       PHYSICAL EXAM: Apperance: Alert and orient in no distress,well developed, and with good attention to grooming and body habits  Patient presents ambulating in [facility]  Lower Extremity Physical Exam:  VASCULAR: Dorsalis pedis pulses 2/4 left and Posterior Tibial pulses 2/4 left.   DERMATOLOGICAL: No skin rashes, subcutaneous nodules, lesions, or ulcers observed bilateral.  NEUROLOGICAL: Light touch, sharp-dull, proprioception all present and equal bilaterally.    MUSCULOSKELETAL: Muscle strength is 5/5 for foot inverters, everters, plantarflexors, and dorsiflexors. Muscle tone is normal. (+) pain on palpation of left ankle inversion and eversion, and plantarflexion. Tenderness on palpation of left lateral ankle at the ATFL and medial ankle.       TEST RESULTS: MRI of left ankle reveals subacute grade 2 sprain of the ATFL and CFL.     Radiographs of left foot/ankle taken 12/02/2023 reveals no acute fractures or dislocations.         Assessment:       ICD-10-CM ICD-9-CM   1. Sprain of anterior talofibular ligament of left ankle, sequela  S93.492S 905.7     845.09   2. Pain of joint of left ankle and foot  M25.572 719.47       Plan:   Sprain of anterior talofibular ligament of left ankle, sequela    Pain of joint of left ankle and foot    I counseled the patient on her conditions, regarding findings of my examination, my impressions, and usual treatment plan.   Patient to completed physical therapy as instructed.  The patient and I reviewed the types of shoes she should be wearing, my recommendation includes generally the best time of the day for a shoe fitting is the afternoon, shoes with a wide toe box, very good cushion, and tennis shoes with removable inner soles.The patient and I reviewed my recommendations for over-the-counter orthotic inserts.   Patient to return in 2 months or sooner if needed.        Dayanna Morelos,  DPM [facility]

## 2024-05-27 ENCOUNTER — CLINICAL SUPPORT (OUTPATIENT)
Dept: REHABILITATION | Facility: HOSPITAL | Age: 29
End: 2024-05-27
Payer: COMMERCIAL

## 2024-05-27 DIAGNOSIS — R26.9 GAIT ABNORMALITY: ICD-10-CM

## 2024-05-27 DIAGNOSIS — R26.89 BALANCE PROBLEM: ICD-10-CM

## 2024-05-27 DIAGNOSIS — R53.1 DECREASED STRENGTH: ICD-10-CM

## 2024-05-27 DIAGNOSIS — R68.89 DECREASED FUNCTIONAL ACTIVITY TOLERANCE: Primary | ICD-10-CM

## 2024-05-27 PROCEDURE — 97110 THERAPEUTIC EXERCISES: CPT

## 2024-05-27 PROCEDURE — 97140 MANUAL THERAPY 1/> REGIONS: CPT

## 2024-05-27 PROCEDURE — 97530 THERAPEUTIC ACTIVITIES: CPT

## 2024-05-28 NOTE — PROGRESS NOTES
[facility]   Physical Therapy Treatment Note       Name: Swati Durán  Clinic Number: [id number]    Therapy Diagnosis:   Encounter Diagnoses   Name Primary?    Decreased functional activity tolerance Yes    Gait abnormality     Balance problem     Decreased strength        Physician: Dayanna Morelos DPM    Visit Date: 5/27/2024    Physician Orders: PT Eval and Treat  Medical Diagnosis from Referral: Sprain of anterior talofibular ligament of left ankle, initial encounter; Pain of joint of left ankle and foot  Evaluation Date: 3/19/2024  Authorization Period Expiration: 12/31/24  Plan of Care Expiration: 7/13/2024  Progress Note Due: 6/13/2024  Visit # / Visits authorized: 17/20 (+1)  FOTO: 3/3 (last performed on 5/13/2024)       Precautions: Standard  PTA Visit #: 0/5     Time In: 0830  Time Out: 0933  Total Billable Time: 55 minutes      SUBJECTIVE     Pt reports: she is feeling good today. Notes she has only had mild discomfort in the ankle but has been able to do some tumbling on the trampoline and mat with no symptoms     She was compliant with home exercise program.  Response to previous treatment: no change  Functional change: no change    Pain: NT/10  Location: left ankle pain along medial post tibialis area and lateral calcaneus      OBJECTIVE     Objective Measures updated at progress report unless specified.       TREATMENT       Swati received the treatments listed below:       CPT Intervention Performed   Today Duration / Intensity   MT Soft tissue  x Gastroc, soleus, posterior tibialis, peroneals    TE Upright Bike  x Level 8 for 8 min   TE Dynamic stretches  x 1 lap each: kicks, knee grabs, quad grabs, sweeps    NMR BFR: 80% occlusion, BLUE cuff  30/15/15/15 repetitions    STANDING ankle DF/PF  Supine ankle inversion/eversion  MOBU Board - evens   NMR Side lying hip    Abduction 10x/ 2 sets  Clamshells 10x/ 2 sets  Reverse clamshells 10x/ 2 sets   NMR Single leg  stance - band pulling medial at ankle x Red band, 3x30s b    TA Standing dynamic balance       TA BOSU squats   10# 3x10    TA Side stepping on toes  x Red band, 4 laps    TA Heel raises x 10# DBs, 3x10   TA Anterior step down to reverse lunge    4 inch, 2x10 b    TA Single leg hopping - lateral  x 30s b    TA Single leg depth drops   6 inch, 2x10 b    TA Squat jumps    2x10    TA Lateral depth drop with rebound    6 inch 2x10 b    TA Double hop to soft landing  x 2x10 b    TA Lateral hop to bound x 10x b    TA Box jumps  X  X  x 6 inch: 10x   12 inch: 5x  20 inch: 10x              PLAN            CPT Codes available for Billing:   (08) minutes of Manual therapy (MT) to improve pain and ROM.  (10) minutes of Therapeutic Exercise (TE) to develop strength, endurance, range of motion, and flexibility.  (05) minutes of Neuromuscular Re-Education (NMR)  to improve: Balance, Coordination, Kinesthetic, Sense, Proprioception, and Posture.  (32) minutes of Therapeutic Activities (TA) to improve functional performance.  Unattended Electrical Stimulation (ES) for muscle performance or pain modulation.  BFR: Blood flow restriction applied during exercise  NP or (-): Not Performed          Plan for Next Visit: move to 1 visit BFR, 1 visit standing activities - SL balance, dynamic balance activities with SLB, , hip thrusters, sidelying hip series.        PATIENT EDUCATION AND HOME EXERCISES     Home Exercises Provided and Patient Education Provided     Education provided:   - home exercise program     Written Home Exercises Provided: Patient instructed to cont prior HEP. Exercises were reviewed and Swati was able to demonstrate them prior to the end of the session.  Swati demonstrated good  understanding of the education provided. See EMR under Patient Instructions for exercises provided during therapy sessions      ASSESSMENT     Patient tolerated session well today. Added single leg lateral hops which patient tolerated  well; therefore, we were able to progress to lateral hop to bound. Pt required more cueing on right side to reduce knee valgus. Added box jumps to improve power and control with landing; pt demonstrated appropriate form with landing.     Swati Is progressing well towards her goals.   Pt prognosis is Good.     Pt will continue to benefit from skilled outpatient physical therapy to address the deficits listed in the problem list box on initial evaluation, provide pt/family education and to maximize pt's level of independence in the home and community environment.     Pt's spiritual, cultural and educational needs considered and pt agreeable to plan of care and goals.     Anticipated barriers to physical therapy:  co-morbidities, chronicity of condition, adherence to treatment plan, financial limitations, occupation, and coping style     Goals:   Reviewed: 5/27/2024       Short Term Goals: In 4 weeks  Progress Date   1.Patient to be educated on HEP. [x] Progressing  [] MET   [] Not MET   [] Not tested  5/1/2024   2.Patient to increase left ankle range of motion, in order to improve available range of motion for ADL's.  [] Progressing  [x] MET   [] Not MET  [] Not tested  5/13/2024   3.Patient to increase bilateral LE strength by 1/2 grade, in order to improve endurance and increase ability to perform all functional activities for increased time.  [] Progressing  [x] MET   [] Not MET  [] Not tested  5/1/2024   4.Patient to have pain less than 5/10 at worst, to improve QOL. [] Progressing  [x] MET   [] Not MET  [] Not tested  5/1/2024   5.Patient to improve score on the FOTO, to improve QOL. [] Progressing  [x] MET   [] Not MET  [] Not tested  5/1/2024   6. Patient to improve score on single leg balance in order to decrease fall risk. [] Progressing  [x] MET   [] Not MET  [] Not tested  5/1/2024      Long Term Goals: In 8 weeks Progress Date   1.Patient to improve score on the FOTO predicted score or better, to improve  QOL. [] Progressing  [x] MET   [] Not MET  [] Not tested  5/1/2024   2. Patient to increase bilateral LE strength to 5/5 or greater, in order to improve endurance and increase ability to perform all functional activities for increased time. [x] Progressing  [] MET   [] Not MET  [] Not tested  5/13/2024   3. Patient to have decreased pain to 2/10 at worst, to improve QOL. [] Progressing  [x] MET   [] Not MET  [] Not tested  5/13/2024   4. Patient to normalize score on single leg balance, in order to improve endurance and decrease fall risk. [] Progressing  [x] MET   [] Not MET  [] Not tested  5/13/2024   5. Patient to perform daily activities including tumbling and dance without increased symptoms. [x] Progressing  [] MET   [] Not MET  [] Not tested  5/13/2024        PLAN     Monitor response to today's treatment session and progress with Physical Therapy plan of care as indicated.    Updated Certification Period: 5/13/24 to 7/13/24   Recommended Treatment Plan: 1-2 times per week for 8 weeks:  Aquatic Therapy, Electrical Stimulation PRN, FDN/PRN, Gait Training, Manual Therapy, Moist Heat/ Ice, Neuromuscular Re-ed, Patient Education, Self Care, Therapeutic Activities, and Therapeutic Exercise    Tara Maki, PT

## 2024-05-29 ENCOUNTER — CLINICAL SUPPORT (OUTPATIENT)
Dept: REHABILITATION | Facility: HOSPITAL | Age: 29
End: 2024-05-29
Payer: COMMERCIAL

## 2024-05-29 DIAGNOSIS — R26.89 BALANCE PROBLEM: ICD-10-CM

## 2024-05-29 DIAGNOSIS — R26.9 GAIT ABNORMALITY: ICD-10-CM

## 2024-05-29 DIAGNOSIS — R53.1 DECREASED STRENGTH: ICD-10-CM

## 2024-05-29 DIAGNOSIS — R68.89 DECREASED FUNCTIONAL ACTIVITY TOLERANCE: Primary | ICD-10-CM

## 2024-05-29 PROCEDURE — 97110 THERAPEUTIC EXERCISES: CPT

## 2024-05-29 PROCEDURE — 97112 NEUROMUSCULAR REEDUCATION: CPT

## 2024-05-29 PROCEDURE — 97530 THERAPEUTIC ACTIVITIES: CPT

## 2024-05-29 NOTE — PROGRESS NOTES
[facility]   Physical Therapy Treatment Note       Name: Swati Durán  Clinic Number: [id number]    Therapy Diagnosis:   Encounter Diagnoses   Name Primary?    Decreased functional activity tolerance Yes    Gait abnormality     Balance problem     Decreased strength        Physician: Dayanna Morelos DPM    Visit Date: 5/29/2024    Physician Orders: PT Eval and Treat  Medical Diagnosis from Referral: Sprain of anterior talofibular ligament of left ankle, initial encounter; Pain of joint of left ankle and foot  Evaluation Date: 3/19/2024  Authorization Period Expiration: 12/31/24  Plan of Care Expiration: 7/13/2024  Progress Note Due: 6/13/2024  Visit # / Visits authorized: 18/20 (+1)  FOTO: 3/3 (last performed on 5/13/2024)       Precautions: Standard  PTA Visit #: 0/5     Time In: 1300  Time Out: 1405  Total Billable Time: 55 minutes      SUBJECTIVE     Pt reports: she has been feeling pretty good with no significant discomfort throughout the week    She was compliant with home exercise program.  Response to previous treatment: no change  Functional change: no change    Pain: NT/10  Location: left ankle pain along medial post tibialis area and lateral calcaneus      OBJECTIVE     Objective Measures updated at progress report unless specified.       TREATMENT       Swati received the treatments listed below:       CPT Intervention Performed   Today Duration / Intensity   MT Soft tissue  x Gastroc, soleus, posterior tibialis, peroneals    MT Functional Dry Needling  Palpation Assessment to determine the necessity for  Functional Dry Needling with electrical stimulation.   See EMR under MEDIA for written consent  provided on 5/29/2024       TE Upright Bike  x Level 8 for 8 min   TE Dynamic stretches x 1 lap each: kicks, knee grabs, quad grabs, sweeps    NMR BFR: 80% occlusion, BLUE cuff  30/15/15/15 repetitions    STANDING ankle DF/PF  Supine ankle inversion/eversion  MOBU Board  - evens   NMR Side lying hip    Abduction 10x/ 2 sets  Clamshells 10x/ 2 sets  Reverse clamshells 10x/ 2 sets   NMR Single leg stance - band pulling medial at ankle   Red band, 3x30s b    TA Standing dynamic balance       TA BOSU squats   10# 3x10    TA Side stepping on toes  x Red band, 4 laps    TA Heel raises x 10# DBs, 3x10   TA Anterior step down to reverse lunge    4 inch, 2x10 b    TA Single leg depth drops   6 inch, 2x10 b    TA Squat jumps    2x10    TA Lateral depth drop with rebound    6 inch 2x10 b    TA Double hop to soft landing  x 5x b    TA Lateral hop to bound x 10x b    TA Box jumps  X 20 inch: 10x    TA Depth drop x 12 inch: 5x  Into squat jump: 12 inch 10x    PLAN           CPT Codes available for Billing:   (25) minutes of Manual therapy (MT) to improve pain and ROM.  (10) minutes of Therapeutic Exercise (TE) to develop strength, endurance, range of motion, and flexibility.  (00) minutes of Neuromuscular Re-Education (NMR)  to improve: Balance, Coordination, Kinesthetic, Sense, Proprioception, and Posture.  (20) minutes of Therapeutic Activities (TA) to improve functional performance.  Unattended Electrical Stimulation (ES) for muscle performance or pain modulation.  BFR: Blood flow restriction applied during exercise  NP or (-): Not Performed          Plan for Next Visit: move to 1 visit BFR, 1 visit standing activities - SL balance, dynamic balance activities with SLB, , hip thrusters, sidelying hip series.        PATIENT EDUCATION AND HOME EXERCISES     Home Exercises Provided and Patient Education Provided     Education provided:   - home exercise program     Written Home Exercises Provided: Patient instructed to cont prior HEP. Exercises were reviewed and Swati was able to demonstrate them prior to the end of the session.  Swati demonstrated good  understanding of the education provided. See EMR under Patient Instructions for exercises provided during therapy sessions      ASSESSMENT      Patient tolerated session well today. It was determined that this patient would benefit from the use of functional dry needling after being cleared for all contraindications. Verbal and written consent obtained. Patient demonstrated appropriate response to Functional Dry Needling with good rhythmical contractions observed with estim to treated muscle groups. Pt continues to have more difficulty with left ankle stability during single leg plyometrics but reports only mild discomfort with movement.        Swati Is progressing well towards her goals.   Pt prognosis is Good.     Pt will continue to benefit from skilled outpatient physical therapy to address the deficits listed in the problem list box on initial evaluation, provide pt/family education and to maximize pt's level of independence in the home and community environment.     Pt's spiritual, cultural and educational needs considered and pt agreeable to plan of care and goals.     Anticipated barriers to physical therapy:  co-morbidities, chronicity of condition, adherence to treatment plan, financial limitations, occupation, and coping style     Goals:   Reviewed: 5/29/2024       Short Term Goals: In 4 weeks  Progress Date   1.Patient to be educated on HEP. [x] Progressing  [] MET   [] Not MET   [] Not tested  5/1/2024   2.Patient to increase left ankle range of motion, in order to improve available range of motion for ADL's.  [] Progressing  [x] MET   [] Not MET  [] Not tested  5/13/2024   3.Patient to increase bilateral LE strength by 1/2 grade, in order to improve endurance and increase ability to perform all functional activities for increased time.  [] Progressing  [x] MET   [] Not MET  [] Not tested  5/1/2024   4.Patient to have pain less than 5/10 at worst, to improve QOL. [] Progressing  [x] MET   [] Not MET  [] Not tested  5/1/2024   5.Patient to improve score on the FOTO, to improve QOL. [] Progressing  [x] MET   [] Not MET  [] Not tested   5/1/2024   6. Patient to improve score on single leg balance in order to decrease fall risk. [] Progressing  [x] MET   [] Not MET  [] Not tested  5/1/2024      Long Term Goals: In 8 weeks Progress Date   1.Patient to improve score on the FOTO predicted score or better, to improve QOL. [] Progressing  [x] MET   [] Not MET  [] Not tested  5/1/2024   2. Patient to increase bilateral LE strength to 5/5 or greater, in order to improve endurance and increase ability to perform all functional activities for increased time. [x] Progressing  [] MET   [] Not MET  [] Not tested  5/13/2024   3. Patient to have decreased pain to 2/10 at worst, to improve QOL. [] Progressing  [x] MET   [] Not MET  [] Not tested  5/13/2024   4. Patient to normalize score on single leg balance, in order to improve endurance and decrease fall risk. [] Progressing  [x] MET   [] Not MET  [] Not tested  5/13/2024   5. Patient to perform daily activities including tumbling and dance without increased symptoms. [x] Progressing  [] MET   [] Not MET  [] Not tested  5/13/2024        PLAN     Monitor response to today's treatment session and progress with Physical Therapy plan of care as indicated.    Updated Certification Period: 5/13/24 to 7/13/24   Recommended Treatment Plan: 1-2 times per week for 8 weeks:  Aquatic Therapy, Electrical Stimulation PRN, FDN/PRN, Gait Training, Manual Therapy, Moist Heat/ Ice, Neuromuscular Re-ed, Patient Education, Self Care, Therapeutic Activities, and Therapeutic Exercise    Tara Maki, PT

## 2024-05-31 ENCOUNTER — OFFICE VISIT (OUTPATIENT)
Dept: FAMILY MEDICINE | Facility: CLINIC | Age: 29
End: 2024-05-31
Payer: COMMERCIAL

## 2024-05-31 ENCOUNTER — PATIENT MESSAGE (OUTPATIENT)
Dept: RESEARCH | Facility: HOSPITAL | Age: 29
End: 2024-05-31
Payer: COMMERCIAL

## 2024-05-31 VITALS
OXYGEN SATURATION: 100 % | HEART RATE: 70 BPM | HEIGHT: 63 IN | TEMPERATURE: 97 F | WEIGHT: 165.13 LBS | BODY MASS INDEX: 29.26 KG/M2 | SYSTOLIC BLOOD PRESSURE: 112 MMHG | DIASTOLIC BLOOD PRESSURE: 80 MMHG

## 2024-05-31 DIAGNOSIS — R09.81 SINUS CONGESTION: ICD-10-CM

## 2024-05-31 DIAGNOSIS — R05.9 COUGH, UNSPECIFIED TYPE: Primary | ICD-10-CM

## 2024-05-31 PROCEDURE — 3079F DIAST BP 80-89 MM HG: CPT | Mod: CPTII,S$GLB,, | Performed by: NURSE PRACTITIONER

## 2024-05-31 PROCEDURE — 3074F SYST BP LT 130 MM HG: CPT | Mod: CPTII,S$GLB,, | Performed by: NURSE PRACTITIONER

## 2024-05-31 PROCEDURE — 99999 PR PBB SHADOW E&M-EST. PATIENT-LVL III: CPT | Mod: PBBFAC,,, | Performed by: NURSE PRACTITIONER

## 2024-05-31 PROCEDURE — 1159F MED LIST DOCD IN RCRD: CPT | Mod: CPTII,S$GLB,, | Performed by: NURSE PRACTITIONER

## 2024-05-31 PROCEDURE — 99214 OFFICE O/P EST MOD 30 MIN: CPT | Mod: 25,S$GLB,, | Performed by: NURSE PRACTITIONER

## 2024-05-31 PROCEDURE — 3008F BODY MASS INDEX DOCD: CPT | Mod: CPTII,S$GLB,, | Performed by: NURSE PRACTITIONER

## 2024-05-31 PROCEDURE — 96372 THER/PROPH/DIAG INJ SC/IM: CPT | Mod: S$GLB,,, | Performed by: NURSE PRACTITIONER

## 2024-05-31 PROCEDURE — 1160F RVW MEDS BY RX/DR IN RCRD: CPT | Mod: CPTII,S$GLB,, | Performed by: NURSE PRACTITIONER

## 2024-05-31 RX ORDER — DEXBROMPHENIRAMINE MALEATE, PHENYLEPHRINE HYDROCHLORIDE 2; 7.5 MG/1; MG/1
1 TABLET ORAL EVERY 6 HOURS PRN
Qty: 30 TABLET | Refills: 2 | Status: SHIPPED | OUTPATIENT
Start: 2024-05-31 | End: 2024-05-31

## 2024-05-31 RX ORDER — PROMETHAZINE HYDROCHLORIDE AND DEXTROMETHORPHAN HYDROBROMIDE 6.25; 15 MG/5ML; MG/5ML
10 SYRUP ORAL EVERY 6 HOURS PRN
Qty: 240 ML | Refills: 0 | Status: SHIPPED | OUTPATIENT
Start: 2024-05-31 | End: 2024-05-31

## 2024-05-31 RX ORDER — METHYLPREDNISOLONE 4 MG/1
TABLET ORAL
Qty: 1 EACH | Refills: 0 | Status: SHIPPED | OUTPATIENT
Start: 2024-05-31

## 2024-05-31 RX ORDER — PROMETHAZINE HYDROCHLORIDE AND DEXTROMETHORPHAN HYDROBROMIDE 6.25; 15 MG/5ML; MG/5ML
10 SYRUP ORAL EVERY 6 HOURS PRN
Qty: 240 ML | Refills: 0 | Status: SHIPPED | OUTPATIENT
Start: 2024-05-31 | End: 2024-06-10

## 2024-05-31 RX ORDER — DEXBROMPHENIRAMINE MALEATE, PHENYLEPHRINE HYDROCHLORIDE 2; 7.5 MG/1; MG/1
1 TABLET ORAL EVERY 6 HOURS PRN
Qty: 30 TABLET | Refills: 2 | Status: SHIPPED | OUTPATIENT
Start: 2024-05-31

## 2024-05-31 RX ORDER — METHYLPREDNISOLONE 4 MG/1
TABLET ORAL
Qty: 1 EACH | Refills: 0 | Status: SHIPPED | OUTPATIENT
Start: 2024-05-31 | End: 2024-05-31

## 2024-05-31 RX ORDER — DEXAMETHASONE SODIUM PHOSPHATE 100 MG/10ML
10 INJECTION INTRAMUSCULAR; INTRAVENOUS
Status: COMPLETED | OUTPATIENT
Start: 2024-05-31 | End: 2024-05-31

## 2024-05-31 RX ADMIN — DEXAMETHASONE SODIUM PHOSPHATE 10 MG: 100 INJECTION INTRAMUSCULAR; INTRAVENOUS at 03:05

## 2024-05-31 NOTE — PROGRESS NOTES
Swati Durán  06/17/2024  [id number]    Danika Fitzgerald MD  Patient Care Team:  Danika Fitzgerald MD as PCP - General (Family Medicine)  Trent Stapleton LPN as Care Coordinator (Internal Medicine)          Visit Type:an urgent visit for a new problem    Chief Complaint:  Chief Complaint   Patient presents with    Sore Throat    thyroid ultrasound f/u       History of Present Illness:    27 yo female presents with co cough, throat pain, sinus congestion for 3 days. Pt denies fever, chills, sweats, CP, SOB, NVD, abdominal pain, fatigue, headache, body aches, loss of taste or smell.     History:  Past Medical History:   Diagnosis Date    Goiter 10/14/2020     Past Surgical History:   Procedure Laterality Date    None       Family History   Problem Relation Name Age of Onset    Hypertension Mother      Other Mother          borderline diabetes    No Known Problems Father      No Known Problems Sister      No Known Problems Brother      Breast cancer Maternal Grandmother      Other Maternal Grandmother          borderline diabetes    Coronary artery disease Maternal Grandmother      Lung cancer Paternal Grandfather      Stroke Neg Hx       Social History     Socioeconomic History    Marital status: Single   Tobacco Use    Smoking status: Never    Smokeless tobacco: Never   Substance and Sexual Activity    Alcohol use: Yes     Alcohol/week: 0.0 standard drinks of alcohol     Comment: Occasionally    Drug use: No    Sexual activity: Yes     Partners: Male     Birth control/protection: Condom   Social History Narrative    She wear seatbelt. 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     Social Determinants of Health     Financial Resource Strain: High Risk (5/31/2024)    Overall Financial Resource Strain (CARDIA)     Difficulty of Paying Living Expenses: Hard   Food Insecurity: Food Insecurity Present (5/31/2024)    Hunger Vital Sign     Worried About Running Out of Food in the Last Year: Sometimes true     Ran Out of Food  in the Last Year: Sometimes true   Transportation Needs: No Transportation Needs (10/11/2021)    PRAPARE - Transportation     Lack of Transportation (Medical): No     Lack of Transportation (Non-Medical): No   Physical Activity: Sufficiently Active (5/31/2024)    Exercise Vital Sign     Days of Exercise per Week: 4 days     Minutes of Exercise per Session: 90 min   Stress: Stress Concern Present (5/31/2024)    [employer] - Occupational Stress Questionnaire     Feeling of Stress : To some extent   Housing Stability: High Risk (5/31/2024)    Housing Stability Vital Sign     Unable to Pay for Housing in the Last Year: Yes     Patient Active Problem List   Diagnosis    Chronic dislocation of right shoulder    Myofacial muscle pain    Rotator cuff tendinitis    Winged scapula of right side    Recurrent subluxation of shoulder    Instability of right shoulder joint    Goiter    Moderate left ankle sprain    Overweight (BMI 25.0-29.9)    Decreased functional activity tolerance    Gait abnormality    Balance problem    Decreased strength     Review of patient's allergies indicates:   Allergen Reactions    Food allergy formula  [[id number]] Hives       The following were reviewed at this visit: active problem list, medication list, allergies, family history, social history, and health maintenance.    Medications:  Current Outpatient Medications on File Prior to Visit   Medication Sig Dispense Refill    ibuprofen (ADVIL,MOTRIN) 800 MG tablet Take 1 tablet (800 mg total) by mouth every 6 (six) hours as needed for Pain. (Patient not taking: Reported on 5/23/2024) 20 tablet 0    traMADoL (ULTRAM) 50 mg tablet Take 1 tablet (50 mg total) by mouth every 6 (six) hours as needed for Pain. (Patient not taking: Reported on 5/23/2024) 12 tablet 0     No current facility-administered medications on file prior to visit.       Medications have been reviewed and reconciled with patient at  this visit.  Barriers to medications reviewed with patient.    Adverse reactions to current medications reviewed with patient..    Over the counter medications reviewed and reconciled with patient.    Exam:  Wt Readings from Last 3 Encounters:   05/31/24 74.9 kg (165 lb 2 oz)   05/23/24 70.9 kg (156 lb 4.9 oz)   02/01/24 70.9 kg (156 lb 4.9 oz)     Temp Readings from Last 3 Encounters:   05/31/24 97.1 °F (36.2 °C) (Temporal)   12/29/23 98 °F (36.7 °C) (Tympanic)   12/02/23 98.6 °F (37 °C) (Oral)     BP Readings from Last 3 Encounters:   05/31/24 112/80   12/29/23 114/76   12/02/23 (!) 111/55     Pulse Readings from Last 3 Encounters:   05/31/24 70   12/29/23 84   12/02/23 90     Body mass index is 29.25 kg/m².      Review of Systems   HENT:  Positive for congestion, ear pain and sore throat. Negative for ear discharge.    Respiratory:  Positive for cough and stridor. Negative for shortness of breath.    Gastrointestinal:  Positive for abdominal pain. Negative for diarrhea and vomiting.   Musculoskeletal:  Positive for neck pain.   Neurological:  Positive for headaches.     Physical Exam  Vitals and nursing note reviewed.   Constitutional:       Appearance: Normal appearance. She is obese.   HENT:      Head: Normocephalic and atraumatic.      Right Ear: Tympanic membrane, ear canal and external ear normal.      Left Ear: Tympanic membrane, ear canal and external ear normal.      Nose: Congestion present.      Mouth/Throat:      Mouth: Mucous membranes are moist.      Pharynx: Oropharynx is clear.   Eyes:      Extraocular Movements: Extraocular movements intact.      Conjunctiva/sclera: Conjunctivae normal.      Pupils: Pupils are equal, round, and reactive to light.   Cardiovascular:      Rate and Rhythm: Normal rate and regular rhythm.      Pulses: Normal pulses.      Heart sounds: Normal heart sounds.   Pulmonary:      Effort: Pulmonary effort is normal.      Breath sounds: Normal breath sounds.   Abdominal:       General: Bowel sounds are normal.      Palpations: Abdomen is soft.   Musculoskeletal:         General: Normal range of motion.      Cervical back: Normal range of motion and neck supple.   Skin:     General: Skin is warm and dry.      Capillary Refill: Capillary refill takes less than 2 seconds.   Neurological:      General: No focal deficit present.      Mental Status: She is alert and oriented to person, place, and time.   Psychiatric:         Mood and Affect: Mood normal.         Behavior: Behavior normal.         Thought Content: Thought content normal.         Judgment: Judgment normal.         Laboratory Reviewed ({Yes)  Lab Results   Component Value Date    WBC 3.62 (L) 12/29/2023    HGB 13.1 12/29/2023    HCT 40.7 12/29/2023     12/29/2023    CHOL 207 (H) 12/29/2023    TRIG 56 12/29/2023    HDL 74 12/29/2023    ALT 11 12/29/2023    AST 17 12/29/2023     12/29/2023    K 4.1 12/29/2023     12/29/2023    CREATININE 0.9 12/29/2023    BUN 13 12/29/2023    CO2 26 12/29/2023    TSH 1.334 12/29/2023    HGBA1C 5.4 12/29/2023       Cough, unspecified type  -     Discontinue: promethazine-dextromethorphan (PROMETHAZINE-DM) 6.25-15 mg/5 mL Syrp; Take 10 mLs by mouth every 6 (six) hours as needed.  -     promethazine-dextromethorphan (PROMETHAZINE-DM) 6.25-15 mg/5 mL Syrp; Take 10 mLs by mouth every 6 (six) hours as needed.  -     promethazine-dextromethorphan (PROMETHAZINE-DM) 6.25-15 mg/5 mL Syrp; Take 10 mLs by mouth every 6 (six) hours as needed.      Plan   Start medications prescribed today       Care Plan/Goals: Reviewed    Goals    None     Cough, unspecified type     promethazine-dextromethorphan (PROMETHAZINE-DM) 6.25-15 mg/5 mL Syrp; Take 10 mLs by mouth every 6 (six) hours as needed.     Follow up: Follow up for with  for 6 month follow up.    After visit summary was printed and given to patient upon discharge today.  Patient goals and care plan are included in After Visit  Summary.

## 2024-06-03 ENCOUNTER — CLINICAL SUPPORT (OUTPATIENT)
Dept: REHABILITATION | Facility: HOSPITAL | Age: 29
End: 2024-06-03
Payer: COMMERCIAL

## 2024-06-03 DIAGNOSIS — R26.9 GAIT ABNORMALITY: ICD-10-CM

## 2024-06-03 DIAGNOSIS — R68.89 DECREASED FUNCTIONAL ACTIVITY TOLERANCE: Primary | ICD-10-CM

## 2024-06-03 DIAGNOSIS — R26.89 BALANCE PROBLEM: ICD-10-CM

## 2024-06-03 DIAGNOSIS — R53.1 DECREASED STRENGTH: ICD-10-CM

## 2024-06-03 PROCEDURE — 97530 THERAPEUTIC ACTIVITIES: CPT

## 2024-06-03 PROCEDURE — 97140 MANUAL THERAPY 1/> REGIONS: CPT

## 2024-06-03 PROCEDURE — 97110 THERAPEUTIC EXERCISES: CPT

## 2024-06-03 NOTE — PROGRESS NOTES
[facility]   Physical Therapy Treatment Note       Name: Swati Durán  Clinic Number: [id number]    Therapy Diagnosis:   Encounter Diagnoses   Name Primary?    Decreased functional activity tolerance Yes    Gait abnormality     Balance problem     Decreased strength        Physician: Dayanna Morelos DPM    Visit Date: 6/3/2024    Physician Orders: PT Eval and Treat  Medical Diagnosis from Referral: Sprain of anterior talofibular ligament of left ankle, initial encounter; Pain of joint of left ankle and foot  Evaluation Date: 3/19/2024  Authorization Period Expiration: 12/31/24  Plan of Care Expiration: 7/13/2024  Progress Note Due: 6/13/2024  Visit # / Visits authorized: 19/20 (+1)  FOTO: 3/3 (last performed on 5/13/2024)       Precautions: Standard  PTA Visit #: 0/5     Time In: 1132  Time Out: 1231  Total Billable Time: 55 minutes      SUBJECTIVE     Pt reports: she has been feeling great with no adverse symptoms. States she was able to wear heels for the first time over the weekend and was surprised that she had no pain     She was compliant with home exercise program.  Response to previous treatment: no change  Functional change: no change    Pain: NT/10  Location: left ankle pain along medial post tibialis area and lateral calcaneus      OBJECTIVE     Objective Measures updated at progress report unless specified.       TREATMENT       Swati received the treatments listed below:       CPT Intervention Performed   Today Duration / Intensity   MT Soft tissue  x Gastroc, soleus, posterior tibialis, peroneals    MT Functional Dry Needling  Palpation Assessment to determine the necessity for  Functional Dry Needling with electrical stimulation.   See EMR under MEDIA for written consent  provided on 5/29/2024       TE Upright Bike  x Level 8 for 8 min   TE Dynamic stretches x 1 lap each: kicks, knee grabs, quad grabs, sweeps    NMR BFR: 80% occlusion, BLUE cuff  30/15/15/15  repetitions    STANDING ankle DF/PF  Supine ankle inversion/eversion  MOBU Board - evens   NMR Side lying hip    Abduction 10x/ 2 sets  Clamshells 10x/ 2 sets  Reverse clamshells 10x/ 2 sets   NMR Single leg stance - band pulling medial at ankle   Red band, 3x30s b    NMR Single leg stance kettle pass - BOSU  x 5# 3x15 b    TA Standing dynamic balance       TA BOSU squats   10# 3x10    TA Side stepping on toes  x Red band, 4 laps    TA Heel raises x 12# DBs, 3x10   TA Anterior step down to reverse lunge    4 inch, 2x10 b    TA Single leg depth drops   6 inch, 2x10 b    TA Squat jumps    2x10    TA Lateral depth drop with rebound    6 inch 2x10 b    TA Double hop to soft landing  x 2x10 b    TA Lateral hop to bound- orange beam  x 2x8 b    TA Box jumps    20 inch: 10x    TA Depth drop   12 inch: 5x  Into squat jump: 12 inch 10x    TA Seated soleus raises  x 65# 3x15 b    TA Walking lunges x 10# 2 laps    PLAN Leg press (foot position 2/3)         CPT Codes available for Billing:   (10) minutes of Manual therapy (MT) to improve pain and ROM.  (10) minutes of Therapeutic Exercise (TE) to develop strength, endurance, range of motion, and flexibility.  (05) minutes of Neuromuscular Re-Education (NMR)  to improve: Balance, Coordination, Kinesthetic, Sense, Proprioception, and Posture.  (30) minutes of Therapeutic Activities (TA) to improve functional performance.  Unattended Electrical Stimulation (ES) for muscle performance or pain modulation.  BFR: Blood flow restriction applied during exercise  NP or (-): Not Performed          Plan for Next Visit: move to 1 visit BFR, 1 visit standing activities - SL balance, dynamic balance activities with SLB, , hip thrusters, sidelying hip series.        PATIENT EDUCATION AND HOME EXERCISES     Home Exercises Provided and Patient Education Provided     Education provided:   - home exercise program     Written Home Exercises Provided: Patient instructed to cont prior HEP. Exercises  were reviewed and Swati was able to demonstrate them prior to the end of the session.  Swati demonstrated good  understanding of the education provided. See EMR under Patient Instructions for exercises provided during therapy sessions      ASSESSMENT     Patient tolerated session well today. Pt progressed to single leg stance kettle pass on bosu ball due to improved single leg stability. Added soleus raises to improve functional strength of posterior chain of ankle. Pt continues to demonstrate occasional loss of stability with lateral hop to bound indicating continued deficits in stability with dynamic movement.     Swati Is progressing well towards her goals.   Pt prognosis is Good.     Pt will continue to benefit from skilled outpatient physical therapy to address the deficits listed in the problem list box on initial evaluation, provide pt/family education and to maximize pt's level of independence in the home and community environment.     Pt's spiritual, cultural and educational needs considered and pt agreeable to plan of care and goals.     Anticipated barriers to physical therapy:  co-morbidities, chronicity of condition, adherence to treatment plan, financial limitations, occupation, and coping style     Goals:   Reviewed: 6/3/2024       Short Term Goals: In 4 weeks  Progress Date   1.Patient to be educated on HEP. [x] Progressing  [] MET   [] Not MET   [] Not tested  5/1/2024   2.Patient to increase left ankle range of motion, in order to improve available range of motion for ADL's.  [] Progressing  [x] MET   [] Not MET  [] Not tested  5/13/2024   3.Patient to increase bilateral LE strength by 1/2 grade, in order to improve endurance and increase ability to perform all functional activities for increased time.  [] Progressing  [x] MET   [] Not MET  [] Not tested  5/1/2024   4.Patient to have pain less than 5/10 at worst, to improve QOL. [] Progressing  [x] MET   [] Not MET  [] Not tested  5/1/2024    5.Patient to improve score on the FOTO, to improve QOL. [] Progressing  [x] MET   [] Not MET  [] Not tested  5/1/2024   6. Patient to improve score on single leg balance in order to decrease fall risk. [] Progressing  [x] MET   [] Not MET  [] Not tested  5/1/2024      Long Term Goals: In 8 weeks Progress Date   1.Patient to improve score on the FOTO predicted score or better, to improve QOL. [] Progressing  [x] MET   [] Not MET  [] Not tested  5/1/2024   2. Patient to increase bilateral LE strength to 5/5 or greater, in order to improve endurance and increase ability to perform all functional activities for increased time. [x] Progressing  [] MET   [] Not MET  [] Not tested  5/13/2024   3. Patient to have decreased pain to 2/10 at worst, to improve QOL. [] Progressing  [x] MET   [] Not MET  [] Not tested  5/13/2024   4. Patient to normalize score on single leg balance, in order to improve endurance and decrease fall risk. [] Progressing  [x] MET   [] Not MET  [] Not tested  5/13/2024   5. Patient to perform daily activities including tumbling and dance without increased symptoms. [x] Progressing  [] MET   [] Not MET  [] Not tested  5/13/2024        PLAN     Monitor response to today's treatment session and progress with Physical Therapy plan of care as indicated.    Updated Certification Period: 5/13/24 to 7/13/24   Recommended Treatment Plan: 1-2 times per week for 8 weeks:  Aquatic Therapy, Electrical Stimulation PRN, FDN/PRN, Gait Training, Manual Therapy, Moist Heat/ Ice, Neuromuscular Re-ed, Patient Education, Self Care, Therapeutic Activities, and Therapeutic Exercise    Tara Maki, PT

## 2024-06-05 ENCOUNTER — CLINICAL SUPPORT (OUTPATIENT)
Dept: REHABILITATION | Facility: HOSPITAL | Age: 29
End: 2024-06-05
Payer: COMMERCIAL

## 2024-06-05 DIAGNOSIS — R26.9 GAIT ABNORMALITY: ICD-10-CM

## 2024-06-05 DIAGNOSIS — R26.89 BALANCE PROBLEM: ICD-10-CM

## 2024-06-05 DIAGNOSIS — R53.1 DECREASED STRENGTH: ICD-10-CM

## 2024-06-05 DIAGNOSIS — R68.89 DECREASED FUNCTIONAL ACTIVITY TOLERANCE: Primary | ICD-10-CM

## 2024-06-05 PROCEDURE — 97110 THERAPEUTIC EXERCISES: CPT

## 2024-06-05 PROCEDURE — 97530 THERAPEUTIC ACTIVITIES: CPT

## 2024-06-05 NOTE — PROGRESS NOTES
[facility]   Physical Therapy Treatment Note       Name: Swati Durán  Clinic Number: [id number]    Therapy Diagnosis:   Encounter Diagnoses   Name Primary?    Decreased functional activity tolerance Yes    Gait abnormality     Balance problem     Decreased strength        Physician: Dayanna Morelos DPM    Visit Date: 6/5/2024    Physician Orders: PT Eval and Treat  Medical Diagnosis from Referral: Sprain of anterior talofibular ligament of left ankle, initial encounter; Pain of joint of left ankle and foot  Evaluation Date: 3/19/2024  Authorization Period Expiration: 12/31/24  Plan of Care Expiration: 7/13/2024  Progress Note Due: 6/13/2024  Visit # / Visits authorized: 20/20 (+1)  FOTO: 3/3 (last performed on 5/13/2024)       Precautions: Standard  PTA Visit #: 0/5     Time In: 0904  Time Out: 1010  Total Billable Time: 58 minutes      SUBJECTIVE     Pt reports: she is feeling good and has not had any pain since previous session.     She was compliant with home exercise program.  Response to previous treatment: no change  Functional change: no change    Pain: NT/10  Location: left ankle pain along medial post tibialis area and lateral calcaneus      OBJECTIVE     Objective Measures updated at progress report unless specified.       TREATMENT       Swati received the treatments listed below:       CPT Intervention Performed   Today Duration / Intensity   MT Soft tissue   Gastroc, soleus, posterior tibialis, peroneals    MT Functional Dry Needling  Palpation Assessment to determine the necessity for  Functional Dry Needling with electrical stimulation.   See EMR under MEDIA for written consent  provided on 5/29/2024       TE Upright Bike  x Level 8 for 8 min   TE Dynamic stretches x 1 lap each: kicks, knee grabs, quad grabs, sweeps    NMR BFR: 80% occlusion, BLUE cuff  30/15/15/15 repetitions    STANDING ankle DF/PF  Supine ankle inversion/eversion  MOBU Board - evens   NMR  Side lying hip    Abduction 10x/ 2 sets  Clamshells 10x/ 2 sets  Reverse clamshells 10x/ 2 sets   NMR Single leg stance - band pulling medial at ankle   Red band, 3x30s b    NMR Single leg stance kettle pass - BOSU  x 5# 2x15 b    TA Standing dynamic balance       TA BOSU squats   10# 3x10    TA Side stepping on toes  x Red band, 4 laps    TA Heel raises x 12# DBs, 3x10   TA Anterior step down to reverse lunge  x 4 inch, 2x10 b    TA Single leg depth drops   6 inch, 2x10 b    TA Squat jumps  x 10x    TA Lateral depth drop with rebound    6 inch 2x10 b    TA Double hop to soft landing    2x10 b    TA Lateral hop to bound- orange beam    2x8 b    TA Box jumps    20 inch: 10x    TA Depth drop to squat jump x 12 inch 2x10   TA Seated soleus raises  x 75# 3x15 b    TA Walking lunges x 10# 2 laps    TA Toe raises on wall  X 3x10    TA Chin jumps - 12 inch  X 3 laps forward   3 laps lateral    PLAN Leg press (foot position 2/3)         CPT Codes available for Billing:   (00) minutes of Manual therapy (MT) to improve pain and ROM.  (10) minutes of Therapeutic Exercise (TE) to develop strength, endurance, range of motion, and flexibility.  (05) minutes of Neuromuscular Re-Education (NMR)  to improve: Balance, Coordination, Kinesthetic, Sense, Proprioception, and Posture.  (43) minutes of Therapeutic Activities (TA) to improve functional performance.  Unattended Electrical Stimulation (ES) for muscle performance or pain modulation.  BFR: Blood flow restriction applied during exercise  NP or (-): Not Performed          Plan for Next Visit: move to 1 visit BFR, 1 visit standing activities - SL balance, dynamic balance activities with SLB, , hip thrusters, sidelying hip series.        PATIENT EDUCATION AND HOME EXERCISES     Home Exercises Provided and Patient Education Provided     Education provided:   - home exercise program     Written Home Exercises Provided: Patient instructed to cont prior HEP. Exercises were reviewed  and Swati was able to demonstrate them prior to the end of the session.  Swati demonstrated good  understanding of the education provided. See EMR under Patient Instructions for exercises provided during therapy sessions      ASSESSMENT     Patient tolerated session well today. Incorporated mateo jumps to further progress power and absorption necessary for higher level activities needed for occupation. She continues to demonstrate improved single leg stability and requires decreased assistance from contralateral lower extremity with step down to reverse lunge. Pt reports that she is very fatigued following session but has no adverse symptoms following session.     Swati Is progressing well towards her goals.   Pt prognosis is Good.     Pt will continue to benefit from skilled outpatient physical therapy to address the deficits listed in the problem list box on initial evaluation, provide pt/family education and to maximize pt's level of independence in the home and community environment.     Pt's spiritual, cultural and educational needs considered and pt agreeable to plan of care and goals.     Anticipated barriers to physical therapy:  co-morbidities, chronicity of condition, adherence to treatment plan, financial limitations, occupation, and coping style     Goals:   Reviewed: 6/5/2024       Short Term Goals: In 4 weeks  Progress Date   1.Patient to be educated on HEP. [x] Progressing  [] MET   [] Not MET   [] Not tested  5/1/2024   2.Patient to increase left ankle range of motion, in order to improve available range of motion for ADL's.  [] Progressing  [x] MET   [] Not MET  [] Not tested  5/13/2024   3.Patient to increase bilateral LE strength by 1/2 grade, in order to improve endurance and increase ability to perform all functional activities for increased time.  [] Progressing  [x] MET   [] Not MET  [] Not tested  5/1/2024   4.Patient to have pain less than 5/10 at worst, to improve QOL. []  Progressing  [x] MET   [] Not MET  [] Not tested  5/1/2024   5.Patient to improve score on the FOTO, to improve QOL. [] Progressing  [x] MET   [] Not MET  [] Not tested  5/1/2024   6. Patient to improve score on single leg balance in order to decrease fall risk. [] Progressing  [x] MET   [] Not MET  [] Not tested  5/1/2024      Long Term Goals: In 8 weeks Progress Date   1.Patient to improve score on the FOTO predicted score or better, to improve QOL. [] Progressing  [x] MET   [] Not MET  [] Not tested  5/1/2024   2. Patient to increase bilateral LE strength to 5/5 or greater, in order to improve endurance and increase ability to perform all functional activities for increased time. [x] Progressing  [] MET   [] Not MET  [] Not tested  5/13/2024   3. Patient to have decreased pain to 2/10 at worst, to improve QOL. [] Progressing  [x] MET   [] Not MET  [] Not tested  5/13/2024   4. Patient to normalize score on single leg balance, in order to improve endurance and decrease fall risk. [] Progressing  [x] MET   [] Not MET  [] Not tested  5/13/2024   5. Patient to perform daily activities including tumbling and dance without increased symptoms. [x] Progressing  [] MET   [] Not MET  [] Not tested  5/13/2024        PLAN     Monitor response to today's treatment session and progress with Physical Therapy plan of care as indicated.    Updated Certification Period: 5/13/24 to 7/13/24   Recommended Treatment Plan: 1-2 times per week for 8 weeks:  Aquatic Therapy, Electrical Stimulation PRN, FDN/PRN, Gait Training, Manual Therapy, Moist Heat/ Ice, Neuromuscular Re-ed, Patient Education, Self Care, Therapeutic Activities, and Therapeutic Exercise    Tara Maki, PT

## 2024-06-10 ENCOUNTER — CLINICAL SUPPORT (OUTPATIENT)
Dept: REHABILITATION | Facility: HOSPITAL | Age: 29
End: 2024-06-10
Payer: COMMERCIAL

## 2024-06-10 DIAGNOSIS — R26.89 BALANCE PROBLEM: ICD-10-CM

## 2024-06-10 DIAGNOSIS — R53.1 DECREASED STRENGTH: ICD-10-CM

## 2024-06-10 DIAGNOSIS — R68.89 DECREASED FUNCTIONAL ACTIVITY TOLERANCE: Primary | ICD-10-CM

## 2024-06-10 DIAGNOSIS — R26.9 GAIT ABNORMALITY: ICD-10-CM

## 2024-06-10 PROCEDURE — 97112 NEUROMUSCULAR REEDUCATION: CPT

## 2024-06-10 PROCEDURE — 97140 MANUAL THERAPY 1/> REGIONS: CPT

## 2024-06-10 PROCEDURE — 97110 THERAPEUTIC EXERCISES: CPT

## 2024-06-10 PROCEDURE — 97530 THERAPEUTIC ACTIVITIES: CPT

## 2024-06-10 NOTE — PROGRESS NOTES
[facility]   Physical Therapy Treatment Note       Name: Swati Durán  Clinic Number: [id number]    Therapy Diagnosis:   Encounter Diagnoses   Name Primary?    Decreased functional activity tolerance Yes    Gait abnormality     Balance problem     Decreased strength        Physician: Dayanna Morelos DPM    Visit Date: 6/10/2024    Physician Orders: PT Eval and Treat  Medical Diagnosis from Referral: Sprain of anterior talofibular ligament of left ankle, initial encounter; Pain of joint of left ankle and foot  Evaluation Date: 3/19/2024  Authorization Period Expiration: 12/31/24  Plan of Care Expiration: 7/13/2024  Progress Note Due: 6/13/2024  Visit # / Visits authorized: 21/20 (+1)  FOTO: 3/3 (last performed on 5/13/2024)       Precautions: Standard  PTA Visit #: 0/5     Time In: 0830  Time Out: 0932  Total Billable Time: 55 minutes      SUBJECTIVE     Pt reports: she has been feeling pretty good. States she had some private lessons this weekend where she was demonstrating a lot of tumbling. Was mildly sore following but states she did not have much discomfort during the sessions.     She was compliant with home exercise program.  Response to previous treatment: no change  Functional change: able to return to tumbling with minimal symptoms     Pain: NT/10  Location: left ankle pain along medial post tibialis area and lateral calcaneus      OBJECTIVE     Objective Measures updated at progress report unless specified.       TREATMENT       Swati received the treatments listed below:       CPT Intervention Performed   Today Duration / Intensity   MT Soft tissue        MT Functional Dry Needling  Palpation Assessment to determine the necessity for  Functional Dry Needling with electrical stimulation.   See EMR under MEDIA for written consent  provided on 5/29/2024 x Gastrocnemius and posterior tibialis   TE Elliptical x Level 5, 3 mins fw and bw    TE Dynamic stretches x 1 lap  each: kicks, knee grabs, quad grabs, sweeps    NMR BFR: 80% occlusion, BLUE cuff  30/15/15/15 repetitions    STANDING ankle DF/PF  Supine ankle inversion/eversion  MOBU Board - evens   NMR Single leg stance - band pulling medial at ankle   Red band, 3x30s b    NMR Single leg stance + abduction isometric - BOSU  x Green band 3x30s b    TA Side stepping on toes  x Red band, 4 laps    TA Heel raises   12# DBs, 3x10   TA Anterior step down to reverse lunge    4 inch, 2x10 b    TA Single leg depth drops x 6 inch, 2x10 b    TA Squat jumps    10x    TA Lateral depth drop with rebound    6 inch 2x10 b    TA Double hop to soft landing    2x10 b    TA Lateral hop to bound- orange beam    2x8 b    TA Box jumps    20 inch: 10x    TA Depth drop to squat jump   12 inch 2x10   TA Standing soleus raises  x 3x10 b    TA Walking lunges x 10# 2 laps    TA Toe raises on wall    3x10    TA Chin jumps - 12 inch    3 laps forward   3 laps lateral    TA Leg press  x Single leg (foot position 3) 65# 3x10 b    PLAN Leg press (foot position 2/3)  Standing soleus raise          CPT Codes available for Billing:   (15) minutes of Manual therapy (MT) to improve pain and ROM.  (10) minutes of Therapeutic Exercise (TE) to develop strength, endurance, range of motion, and flexibility.  (08) minutes of Neuromuscular Re-Education (NMR)  to improve: Balance, Coordination, Kinesthetic, Sense, Proprioception, and Posture.  (22) minutes of Therapeutic Activities (TA) to improve functional performance.  Unattended Electrical Stimulation (ES) for muscle performance or pain modulation.  BFR: Blood flow restriction applied during exercise  NP or (-): Not Performed    PATIENT EDUCATION AND HOME EXERCISES     Home Exercises Provided and Patient Education Provided     Education provided:   - home exercise program     Written Home Exercises Provided: Patient instructed to cont prior HEP. Exercises were reviewed and Swati was able to demonstrate them prior to  the end of the session.  Swati demonstrated good  understanding of the education provided. See EMR under Patient Instructions for exercises provided during therapy sessions      ASSESSMENT      Patient tolerated session well today. It was determined that this patient would benefit from the use of functional dry needling after being cleared for all contraindications. Verbal and written consent obtained. Patient demonstrated appropriate response to Functional Dry Needling with good rhythmical contractions observed with estim to treated muscle groups. Incorporated single leg soleus raises and leg press with foot placed low on the plate in order to improve distal posterior chain strength; pt notes no adverse symptoms with this intervention.     Swati Is progressing well towards her goals.   Pt prognosis is Good.     Pt will continue to benefit from skilled outpatient physical therapy to address the deficits listed in the problem list box on initial evaluation, provide pt/family education and to maximize pt's level of independence in the home and community environment.     Pt's spiritual, cultural and educational needs considered and pt agreeable to plan of care and goals.     Anticipated barriers to physical therapy:  co-morbidities, chronicity of condition, adherence to treatment plan, financial limitations, occupation, and coping style     Goals:   Reviewed: 6/10/2024       Short Term Goals: In 4 weeks  Progress Date   1.Patient to be educated on HEP. [x] Progressing  [] MET   [] Not MET   [] Not tested  5/1/2024   2.Patient to increase left ankle range of motion, in order to improve available range of motion for ADL's.  [] Progressing  [x] MET   [] Not MET  [] Not tested  5/13/2024   3.Patient to increase bilateral LE strength by 1/2 grade, in order to improve endurance and increase ability to perform all functional activities for increased time.  [] Progressing  [x] MET   [] Not MET  [] Not tested  5/1/2024    4.Patient to have pain less than 5/10 at worst, to improve QOL. [] Progressing  [x] MET   [] Not MET  [] Not tested  5/1/2024   5.Patient to improve score on the FOTO, to improve QOL. [] Progressing  [x] MET   [] Not MET  [] Not tested  5/1/2024   6. Patient to improve score on single leg balance in order to decrease fall risk. [] Progressing  [x] MET   [] Not MET  [] Not tested  5/1/2024      Long Term Goals: In 8 weeks Progress Date   1.Patient to improve score on the FOTO predicted score or better, to improve QOL. [] Progressing  [x] MET   [] Not MET  [] Not tested  5/1/2024   2. Patient to increase bilateral LE strength to 5/5 or greater, in order to improve endurance and increase ability to perform all functional activities for increased time. [x] Progressing  [] MET   [] Not MET  [] Not tested  5/13/2024   3. Patient to have decreased pain to 2/10 at worst, to improve QOL. [] Progressing  [x] MET   [] Not MET  [] Not tested  5/13/2024   4. Patient to normalize score on single leg balance, in order to improve endurance and decrease fall risk. [] Progressing  [x] MET   [] Not MET  [] Not tested  5/13/2024   5. Patient to perform daily activities including tumbling and dance without increased symptoms. [x] Progressing  [] MET   [] Not MET  [] Not tested  5/13/2024        PLAN     Monitor response to today's treatment session and progress with Physical Therapy plan of care as indicated.    Updated Certification Period: 5/13/24 to 7/13/24   Recommended Treatment Plan: 1-2 times per week for 8 weeks:  Aquatic Therapy, Electrical Stimulation PRN, FDN/PRN, Gait Training, Manual Therapy, Moist Heat/ Ice, Neuromuscular Re-ed, Patient Education, Self Care, Therapeutic Activities, and Therapeutic Exercise    Tara Maki, PT

## 2024-06-12 ENCOUNTER — CLINICAL SUPPORT (OUTPATIENT)
Dept: REHABILITATION | Facility: HOSPITAL | Age: 29
End: 2024-06-12
Payer: COMMERCIAL

## 2024-06-12 DIAGNOSIS — R53.1 DECREASED STRENGTH: ICD-10-CM

## 2024-06-12 DIAGNOSIS — R68.89 DECREASED FUNCTIONAL ACTIVITY TOLERANCE: Primary | ICD-10-CM

## 2024-06-12 DIAGNOSIS — R26.9 GAIT ABNORMALITY: ICD-10-CM

## 2024-06-12 DIAGNOSIS — R26.89 BALANCE PROBLEM: ICD-10-CM

## 2024-06-12 PROCEDURE — 97530 THERAPEUTIC ACTIVITIES: CPT

## 2024-06-12 PROCEDURE — 97112 NEUROMUSCULAR REEDUCATION: CPT

## 2024-06-12 PROCEDURE — 97110 THERAPEUTIC EXERCISES: CPT

## 2024-06-12 NOTE — PROGRESS NOTES
[facility]   Physical Therapy Treatment Note / Discharge Note       Name: Swati Durán  Clinic Number: [id number]    Therapy Diagnosis:   Encounter Diagnoses   Name Primary?    Decreased functional activity tolerance Yes    Gait abnormality     Balance problem     Decreased strength        Physician: Dayanna Morelos DPM    Visit Date: 6/12/2024    Physician Orders: PT Eval and Treat  Medical Diagnosis from Referral: Sprain of anterior talofibular ligament of left ankle, initial encounter; Pain of joint of left ankle and foot  Evaluation Date: 3/19/2024  Authorization Period Expiration: 12/31/24  Plan of Care Expiration: 7/13/2024  Progress Note Due: 6/13/2024  Visit # / Visits authorized: 21/20 (+1)  FOTO: 3/3 (last performed on 5/13/2024)       Precautions: Standard  PTA Visit #: 0/5     Time In: 0900  Time Out: 1003  Total Billable Time: 58 minutes      SUBJECTIVE     Pt reports: she is feeling great and has had no adverse symptoms since previous session. Notes that she has been tumbling some with no significant issues. Feels that she is ready for discharge today    She was compliant with home exercise program.  Response to previous treatment: no change  Functional change: able to return to tumbling with minimal symptoms     Pain: NT/10  Location: left ankle pain along medial post tibialis area and lateral calcaneus      OBJECTIVE     Objective Measures updated at progress report unless specified.            Balance  Right   (seconds) Left  (seconds) Norms Left  5/1/24 5/13/24   Single Leg Stance 30 5 Less than 4.9 sec high risk  5-6.4 sec increased risk  6.5 or greater low risk 20 sec 30 sec        Range of Motion:     Ankle/Foot AROM/PROM Right Left 5/1/24  Left  5/13/24   Dorsiflexion (20º) 10 -5  10 10   Plantarflexion (50º) 60 40 52 60   Inversion (35º) 55 60 45 50   Eversion (15º) 25 15 20 20   Great Toe Extension (70º) 60 65  NT NT      Strength:     L/E MMT  Right  (spine) Left Pain/Dysfunction with Movement 5/1/24  Left  5/13/24   Hip Flexion  4/5 4/5 + trunk shift NT 4+/5   Knee Extension 4/5 4/5 + trunk shift NT 4+/5   Knee Flexion 4/5 4/5 + trunk shift NT 4+/5   Hip IR 4/5 4/5 + trunk shift NT 4+/5   Hip ER 4/5 4/5 + trunk shift NT 4+/5   Ankle DF 4/5 4-/5   5/5 NT   Ankle PF 4/5 4-/5   5/5 NT   Ankle Inversion 4-/5 3+/5 Pain on left  4+/5-5/5    4+/5-5/5   Ankle Eversion 4-/5 3+/5 Pain on left  4+/5  4+/5-5/5      5/1/24: 14 heel raises single leg.  5/13/24: 20x                TREATMENT       Swati received the treatments listed below:       CPT Intervention Performed   Today Duration / Intensity   MT Soft tissue  x Gastrocnemius, soleus and posterior tibialis   MT Functional Dry Needling  Palpation Assessment to determine the necessity for  Functional Dry Needling with electrical stimulation.   See EMR under MEDIA for written consent  provided on 5/29/2024   Gastrocnemius and posterior tibialis   TE Elliptical x Level 5, 8 mins total (2 mins forward: 2 mins backward)    TE Dynamic stretches x 1 lap each: kicks, knee grabs, quad grabs, sweeps    NMR BFR: 80% occlusion, BLUE cuff  30/15/15/15 repetitions    STANDING ankle DF/PF  Supine ankle inversion/eversion  MOBO Board - evens   NMR Single leg stance - band pulling medial at ankle x Red band, 3x30s b    NMR Single leg stance + around [place] - BOSU  x 7.5# 3x15 b    TA Side stepping on toes  x Red band, 4 laps    TA Heel raises   12# DBs, 3x10   TA Anterior step down to reverse lunge    4 inch, 2x10 b    TA Single leg depth drops x 6 inch, 2x10 b    TA Squat jumps    10x    TA Lateral depth drop with rebound  x 6 inch 2x10 b    TA Double hop to soft landing    2x10 b    TA Lateral hop to bound- orange beam    2x8 b    TA Box jumps    20 inch: 10x    TA Depth drop to squat jump x 12 inch 2x10   TA Standing soleus raises  x 3x10 b    TA Walking lunges x 10# 2 laps    TA Toe raises on wall    3x10    TA Chin  jumps - 12 inch  X  x 3 laps forward   3 laps lateral    TA Leg press  x Single leg (foot position 3) 65# 3x10 b    PLAN Leg press (foot position 2/3)  Standing soleus raise          CPT Codes available for Billing:   (05) minutes of Manual therapy (MT) to improve pain and ROM.  (10) minutes of Therapeutic Exercise (TE) to develop strength, endurance, range of motion, and flexibility.  (08) minutes of Neuromuscular Re-Education (NMR)  to improve: Balance, Coordination, Kinesthetic, Sense, Proprioception, and Posture.  (35) minutes of Therapeutic Activities (TA) to improve functional performance.  Unattended Electrical Stimulation (ES) for muscle performance or pain modulation.  BFR: Blood flow restriction applied during exercise  NP or (-): Not Performed    PATIENT EDUCATION AND HOME EXERCISES     Home Exercises Provided and Patient Education Provided     Education provided:   - home exercise program     Written Home Exercises Provided: Patient instructed to cont prior HEP. Exercises were reviewed and Swati was able to demonstrate them prior to the end of the session.  Swati demonstrated good  understanding of the education provided. See EMR under Patient Instructions for exercises provided during therapy sessions      ASSESSMENT     Patient tolerated session well today. Continued previously performed interventions as part of discharge HEP. Pt was able to perform all interventions with no adverse symptoms. An assessment was completed today with improvements noted in ankle ROM, gross lower extremity strength and functional movement patterns compared to prior assessment; please see exam for details.  FOTO scores noted above indicate the patients perceived functional levels have improve since prior assessment. Patient has met all of her goals and is no longer having adverse symptoms. Patient will be discharged from physical therapy       Swati Is progressing well towards her goals.   Pt prognosis is Good.     Pt  will continue to benefit from skilled outpatient physical therapy to address the deficits listed in the problem list box on initial evaluation, provide pt/family education and to maximize pt's level of independence in the home and community environment.     Pt's spiritual, cultural and educational needs considered and pt agreeable to plan of care and goals.     Anticipated barriers to physical therapy:  co-morbidities, chronicity of condition, adherence to treatment plan, financial limitations, occupation, and coping style     Goals:   Reviewed: 6/12/2024       Short Term Goals: In 4 weeks  Progress Date   1.Patient to be educated on HEP. [x] Progressing  [] MET   [] Not MET   [] Not tested  5/1/2024   2.Patient to increase left ankle range of motion, in order to improve available range of motion for ADL's.  [] Progressing  [x] MET   [] Not MET  [] Not tested  5/13/2024   3.Patient to increase bilateral LE strength by 1/2 grade, in order to improve endurance and increase ability to perform all functional activities for increased time.  [] Progressing  [x] MET   [] Not MET  [] Not tested  5/1/2024   4.Patient to have pain less than 5/10 at worst, to improve QOL. [] Progressing  [x] MET   [] Not MET  [] Not tested  5/1/2024   5.Patient to improve score on the FOTO, to improve QOL. [] Progressing  [x] MET   [] Not MET  [] Not tested  5/1/2024   6. Patient to improve score on single leg balance in order to decrease fall risk. [] Progressing  [x] MET   [] Not MET  [] Not tested  5/1/2024      Long Term Goals: In 8 weeks Progress Date   1.Patient to improve score on the FOTO predicted score or better, to improve QOL. [] Progressing  [x] MET   [] Not MET  [] Not tested  5/1/2024   2. Patient to increase bilateral LE strength to 5/5 or greater, in order to improve endurance and increase ability to perform all functional activities for increased time. [x] Progressing  [] MET   [] Not MET  [] Not tested  5/13/2024   3.  Patient to have decreased pain to 2/10 at worst, to improve QOL. [] Progressing  [x] MET   [] Not MET  [] Not tested  5/13/2024   4. Patient to normalize score on single leg balance, in order to improve endurance and decrease fall risk. [] Progressing  [x] MET   [] Not MET  [] Not tested  5/13/2024   5. Patient to perform daily activities including tumbling and dance without increased symptoms. [x] Progressing  [] MET   [] Not MET  [] Not tested  5/13/2024        PLAN     Monitor response to today's treatment session and progress with Physical Therapy plan of care as indicated.    Updated Certification Period: 5/13/24 to 7/13/24   Recommended Treatment Plan: 1-2 times per week for 8 weeks:  Aquatic Therapy, Electrical Stimulation PRN, FDN/PRN, Gait Training, Manual Therapy, Moist Heat/ Ice, Neuromuscular Re-ed, Patient Education, Self Care, Therapeutic Activities, and Therapeutic Exercise    Tara Maki, PT

## 2024-06-20 ENCOUNTER — HOSPITAL ENCOUNTER (OUTPATIENT)
Dept: RADIOLOGY | Facility: HOSPITAL | Age: 29
Discharge: HOME | End: 2024-06-20
Attending: STUDENT IN AN ORGANIZED HEALTH CARE EDUCATION/TRAINING PROGRAM
Payer: COMMERCIAL

## 2024-06-20 ENCOUNTER — OFFICE VISIT (OUTPATIENT)
Dept: ORTHOPEDICS | Facility: CLINIC | Age: 29
End: 2024-06-20
Payer: COMMERCIAL

## 2024-06-20 VITALS — WEIGHT: 165.13 LBS | HEIGHT: 63 IN | BODY MASS INDEX: 29.26 KG/M2

## 2024-06-20 DIAGNOSIS — S43.52XA SPRAIN OF ACROMIOCLAVICULAR JOINT, LEFT, INITIAL ENCOUNTER: Primary | ICD-10-CM

## 2024-06-20 DIAGNOSIS — M25.512 LEFT SHOULDER PAIN, UNSPECIFIED CHRONICITY: ICD-10-CM

## 2024-06-20 DIAGNOSIS — M25.512 LEFT SHOULDER PAIN, UNSPECIFIED CHRONICITY: Primary | ICD-10-CM

## 2024-06-20 PROCEDURE — 73030 X-RAY EXAM OF SHOULDER: CPT | Mod: 26,LT,, | Performed by: RADIOLOGY

## 2024-06-20 PROCEDURE — 99999 PR PBB SHADOW E&M-EST. PATIENT-LVL III: CPT | Mod: PBBFAC,,, | Performed by: STUDENT IN AN ORGANIZED HEALTH CARE EDUCATION/TRAINING PROGRAM

## 2024-06-20 PROCEDURE — 73030 X-RAY EXAM OF SHOULDER: CPT | Mod: TC,PO,LT

## 2024-06-20 PROCEDURE — 3008F BODY MASS INDEX DOCD: CPT | Mod: CPTII,S$GLB,, | Performed by: STUDENT IN AN ORGANIZED HEALTH CARE EDUCATION/TRAINING PROGRAM

## 2024-06-20 PROCEDURE — 1159F MED LIST DOCD IN RCRD: CPT | Mod: CPTII,S$GLB,, | Performed by: STUDENT IN AN ORGANIZED HEALTH CARE EDUCATION/TRAINING PROGRAM

## 2024-06-20 PROCEDURE — 99203 OFFICE O/P NEW LOW 30 MIN: CPT | Mod: S$GLB,,, | Performed by: STUDENT IN AN ORGANIZED HEALTH CARE EDUCATION/TRAINING PROGRAM

## 2024-06-20 NOTE — PROGRESS NOTES
Patient ID: Swtai Durán  YOB: 1995  MRN: [id number]    Chief Complaint: Pain and Swelling of the Left Shoulder    Referred By: Self for left shoulder    History of Present Illness: Swati Durán is a right-hand dominant 28 y.o. female who presents today with left shoulder pain.     The patient is active in cheerleading and gymnastics.  Occupation: Competitive Cheer  in     Swati Durán states it is Acute in nature and there was a specific mechanism. Had a cheerleader fall onto her left shoulder one week prior.  Swati Durán describes the pain as a continuous anterolateral shoulder and left lateral neck. Associated symptoms include: Swelling No, Instability No, Pain that affects your sleep Yes, Mechanical No, locking/catching No, Neurological No, limited range of motion No. Aggravating activities include reaching overhead or sleeping They have tried  rest so far for this. They believe that they are worse with this treatment rest. They denies formal physical therapy for this. Previous pertinent orthopedic injuries include left labral glenoid tear in [id number] saw Dr Vianey Zamora for this, and right shoulder instability.     Hemoglobin A1C   Date Value Ref Range Status   12/29/2023 5.4 4.0 - 5.6 % Final     Comment:     ADA Screening Guidelines:  5.7-6.4%  Consistent with prediabetes  >or=6.5%  Consistent with diabetes    High levels of fetal hemoglobin interfere with the HbA1C  assay. Heterozygous hemoglobin variants (HbS, HgC, etc)do  not significantly interfere with this assay.   However, presence of multiple variants may affect accuracy.       Past Medical History:   Past Medical History:   Diagnosis Date    Goiter 10/14/2020     Past Surgical History:   Procedure Laterality Date    None       Family History   Problem Relation Name Age of Onset    Hypertension Mother      Other Mother          borderline diabetes    No Known Problems Father      No  Podiatric Surgery Progress Note    Subjective   Patient continues to improve. NAD    Objective     Vital signs in last 24 hours:  Temp:  [97.6 °F (36.4 °C)-99 °F (37.2 °C)] 97.6 °F (36.4 °C)  Heart Rate:  [] 71  Resp:  [18-19] 18  BP: (124-133)/(69-78) 128/78    General: alert, appears stated age, and cooperative   Neurovascular: SILT  Capillary refill: Normal   Wound: No open wounds    Range of Motion: Limited dorsiflexion, Limited plantarflexion, Limited inversion, and Limited eversion   DVT Exam: No evidence of DVT seen on physical exam.     Data Review  CBC:  Results from last 7 days   Lab Units 07/19/23  0536   WBC 10*3/mm3 2.55*   RBC 10*6/mm3 3.67*   HEMOGLOBIN g/dL 10.3*   HEMATOCRIT % 31.3*   PLATELETS 10*3/mm3 108*       Assessment & Plan     Bacteremia     Patient improving. Urinalysis with bacteria. Urine culture not obtained. Continue IV antibiotics. No surgical intervention planned. Call with questions.      LOS: 2 days     Marco A Thompson DPM    Date: 7/19/2023  Time: 12:30 CDT     Known Problems Sister      No Known Problems Brother      Breast cancer Maternal Grandmother      Other Maternal Grandmother          borderline diabetes    Coronary artery disease Maternal Grandmother      Lung cancer Paternal Grandfather      Stroke Neg Hx       Social History     Socioeconomic History    Marital status: Single   Tobacco Use    Smoking status: Never    Smokeless tobacco: Never   Substance and Sexual Activity    Alcohol use: Yes     Alcohol/week: 0.0 standard drinks of alcohol     Comment: Occasionally    Drug use: No    Sexual activity: Yes     Partners: Male     Birth control/protection: Condom   Social History Narrative    She wear seatbelt. 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     Social Determinants of Health     Financial Resource Strain: High Risk (5/31/2024)    Overall Financial Resource Strain (CARDIA)     Difficulty of Paying Living Expenses: Hard   Food Insecurity: Food Insecurity Present (5/31/2024)    Hunger Vital Sign     Worried About Running Out of Food in the Last Year: Sometimes true     Ran Out of Food in the Last Year: Sometimes true   Transportation Needs: No Transportation Needs (10/11/2021)    PRAPARE - Transportation     Lack of Transportation (Medical): No     Lack of Transportation (Non-Medical): No   Physical Activity: Sufficiently Active (5/31/2024)    Exercise Vital Sign     Days of Exercise per Week: 4 days     Minutes of Exercise per Session: 90 min   Stress: Stress Concern Present (5/31/2024)    [place] [employer] - Occupational Stress Questionnaire     Feeling of Stress : To some extent   Housing Stability: High Risk (5/31/2024)    Housing Stability Vital Sign     Unable to Pay for Housing in the Last Year: Yes     Medication List with Changes/Refills   Current Medications    DEXBROMPHENIRAMINE-PHENYLEPH (ALAHIST PE) 2-7.5 MG TAB    Take 1 tablet by mouth every 6 (six) hours as needed (sinus congestion).    IBUPROFEN (ADVIL,MOTRIN) 800 MG TABLET     Take 1 tablet (800 mg total) by mouth every 6 (six) hours as needed for Pain.    METHYLPREDNISOLONE (MEDROL DOSEPACK) 4 MG TABLET    TAKE AS DIRECTED    METHYLPREDNISOLONE (MEDROL DOSEPACK) 4 MG TABLET    TAKE AS DIRECTED    TRAMADOL (ULTRAM) 50 MG TABLET    Take 1 tablet (50 mg total) by mouth every 6 (six) hours as needed for Pain.     Review of patient's allergies indicates:   Allergen Reactions    Food allergy formula  [glutamine-c-quercet-selen-brom] Hives       Physical Exam:   Body mass index is 29.25 kg/m².    GENERAL: Well appearing, in no acute distress.  HEAD: Normocephalic and atraumatic.  ENT: External ears and nose grossly normal.  EYES: EOMI bilaterally  PULMONARY: Respirations are grossly even and non-labored.  NEURO: Awake, alert, and oriented x 3.  SKIN: No obvious rashes appreciated.  PSYCH: Mood & affect are appropriate.    Detailed MSK exam:     Cervical spine:       Left shoulder  ROM- L shoulder:  Full and symmetric, negative drop arm.    Resisted strength-   Flexion 5/5, ER 5/5, IR 5/5, extension 5/5    Tenderness to palpation: AC positive, long head biceps tendon negative, anterior capsule positive, posterior capsule negative, biceps negative, rotator cuff negative    Neers negative, Henderson Edwin in scaption negative, Henderson Edwin in cross body positive, Cross-body adduction positive, Obriens equivocal at both positions over AC, Speeds positive, Empty can (resisted scaption) positive, Full can (resisted scaption) negative, Bear hug positive.     Imaging:    Per my review possible slight widening and mild elevation of the clavicle at the acromioclavicular joint without any other signs of arthritis.      Relevant imaging results were reviewed and interpreted by me and per my read as above.  This was discussed with the patient and / or family today.     Assessment:  Swati Durán is a 28 y.o. female presents today for grade 1/grade 2 AC sprain.  Mechanism congruent with this type  of injury and appears to have most pain over the AC joint itself today.  Some mild widening and elevation of the clavicle today and simple shoulder x-rays.  Discussed getting back to the [facility] for some formal therapy oral anti-inflammatories as needed relative rest and will follow up in 2-3 weeks at the [facility] pending clinical improvement.  Discussed this should only take maximum of 3-4 weeks for recovery if still having person systems would consider advanced imaging re-evaluation.    Sprain of acromioclavicular joint, left, initial encounter  -     Ambulatory referral/consult to Physical/Occupational Therapy; Future; Expected date: 06/27/2024         A copy of today's visit note has been sent to the referring provider.       Williams Carey MD    Disclaimer: This note was prepared using a voice recognition system and is likely to have sound alike errors within the text.

## 2024-08-20 ENCOUNTER — PATIENT MESSAGE (OUTPATIENT)
Dept: ADMINISTRATIVE | Facility: HOSPITAL | Age: 29
End: 2024-08-20
Payer: COMMERCIAL

## 2024-08-26 ENCOUNTER — PATIENT OUTREACH (OUTPATIENT)
Dept: ADMINISTRATIVE | Facility: HOSPITAL | Age: 29
End: 2024-08-26
Payer: COMMERCIAL

## 2024-11-07 ENCOUNTER — HOSPITAL ENCOUNTER (EMERGENCY)
Facility: HOSPITAL | Age: 29
Discharge: HOME | End: 2024-11-07
Attending: EMERGENCY MEDICINE
Payer: COMMERCIAL

## 2024-11-07 VITALS
SYSTOLIC BLOOD PRESSURE: 117 MMHG | RESPIRATION RATE: 20 BRPM | BODY MASS INDEX: 29.77 KG/M2 | HEART RATE: 64 BPM | OXYGEN SATURATION: 99 % | HEIGHT: 63 IN | WEIGHT: 168 LBS | DIASTOLIC BLOOD PRESSURE: 63 MMHG | TEMPERATURE: 98 F

## 2024-11-07 DIAGNOSIS — M79.603 ARM PAIN: ICD-10-CM

## 2024-11-07 DIAGNOSIS — M79.602 PAIN OF LEFT UPPER EXTREMITY: Primary | ICD-10-CM

## 2024-11-07 PROCEDURE — 99283 EMERGENCY DEPT VISIT LOW MDM: CPT | Mod: 25

## 2024-11-07 NOTE — Clinical Note
"Swati Olsona" Leeanna was seen and treated in our emergency department on 11/7/2024.  She may return to work on 11/09/2024.       If you have any questions or concerns, please don't hesitate to call.      Nicho Mayo NP"

## 2024-11-08 NOTE — ED PROVIDER NOTES
Encounter Date: 11/7/2024       History     Chief Complaint   Patient presents with    Arm Pain     Pain to left forearm x 2 weeks, denies trauma.     Complains of left forearm pain for 2 weeks denies any known mechanism of injury does tumbling and gymnastics but does not recall an injury.        Review of patient's allergies indicates:   Allergen Reactions    Food allergy formula  [glutamine-c-quercet-selen-brom] Hives     Past Medical History:   Diagnosis Date    Goiter 10/14/2020     Past Surgical History:   Procedure Laterality Date    None       Family History   Problem Relation Name Age of Onset    Hypertension Mother      Other Mother          borderline diabetes    No Known Problems Father      No Known Problems Sister      No Known Problems Brother      Breast cancer Maternal Grandmother      Other Maternal Grandmother          borderline diabetes    Coronary artery disease Maternal Grandmother      Lung cancer Paternal Grandfather      Stroke Neg Hx       Social History     Tobacco Use    Smoking status: Never    Smokeless tobacco: Never   Substance Use Topics    Alcohol use: Yes     Alcohol/week: 0.0 standard drinks of alcohol     Comment: Occasionally    Drug use: No     Review of Systems   Constitutional:  Negative for fever.   HENT:  Negative for sore throat.    Respiratory:  Negative for shortness of breath.    Cardiovascular:  Negative for chest pain.   Gastrointestinal:  Negative for nausea.   Genitourinary:  Negative for dysuria.   Musculoskeletal:  Negative for back pain.   Skin:  Negative for rash.   Neurological:  Negative for weakness.   Hematological:  Does not bruise/bleed easily.       Physical Exam     Initial Vitals [11/07/24 2142]   BP Pulse Resp Temp SpO2   117/63 64 20 97.8 °F (36.6 °C) 99 %      MAP       --         Physical Exam    Nursing note and vitals reviewed.  Constitutional: She appears well-developed and well-nourished. She is not diaphoretic. She is active.  Non-toxic  appearance. No distress.   HENT:   Head: Normocephalic and atraumatic.   Eyes: Conjunctivae are normal. Right eye exhibits no discharge. Left eye exhibits no discharge. No scleral icterus.   Neck:   Normal range of motion.  Cardiovascular:  Normal rate, regular rhythm and intact distal pulses.           No murmur heard.  Pulmonary/Chest: Breath sounds normal. No respiratory distress. She has no wheezes.   Abdominal: She exhibits no distension.   Musculoskeletal:         General: No tenderness. Normal range of motion.      Cervical back: Normal range of motion.      Comments: Forearm muscles and extensor muscle tenderness with range of motion no gross deformity no erythema no limit in ROM     Neurological: She is alert and oriented to person, place, and time. No cranial nerve deficit. GCS score is 15. GCS eye subscore is 4. GCS verbal subscore is 5. GCS motor subscore is 6.   Skin: Skin is warm and dry. Capillary refill takes less than 2 seconds. No rash noted.   Psychiatric: She has a normal mood and affect. Her behavior is normal. Judgment and thought content normal.         ED Course   Procedures  Labs Reviewed - No data to display       Imaging Results              X-Ray Forearm Left (Final result)  Result time 11/07/24 22:22:21      Final result by Paolo Chávez MD (11/07/24 22:22:21)                   Impression:      No acute fracture or dislocation      Electronically signed by: Paolo Chávez  Date:    11/07/2024  Time:    22:22               Narrative:    EXAMINATION:  XR FOREARM LEFT    CLINICAL HISTORY:  Pain in arm, unspecified    TECHNIQUE:  AP and lateral views of the left forearm were performed.    COMPARISON:  None    FINDINGS:  No acute fracture or dislocation.  Normal bone mineral density.  Normal soft tissues                                       Medications - No data to display  Medical Decision Making  Differential diagnosis considered but not limited to; forearm strain forearm sprain    Amount  and/or Complexity of Data Reviewed  Radiology: ordered.                                      Clinical Impression:  Final diagnoses:  [M79.603] Arm pain  [M79.602] Pain of left upper extremity (Primary)          ED Disposition Condition    Discharge Stable          ED Prescriptions    None       Follow-up Information       Follow up With Specialties Details Why Contact Info    Danika Fitzgerald MD Family Medicine Schedule an appointment as soon as possible for a visit  As needed [id number]  [facility] [id number]  [phone]               Nicho Mayo NP  11/08/24 2266

## 2024-11-18 ENCOUNTER — PATIENT MESSAGE (OUTPATIENT)
Dept: ADMINISTRATIVE | Facility: HOSPITAL | Age: 29
End: 2024-11-18
Payer: COMMERCIAL

## 2024-11-18 ENCOUNTER — PATIENT OUTREACH (OUTPATIENT)
Dept: ADMINISTRATIVE | Facility: HOSPITAL | Age: 29
End: 2024-11-18
Payer: COMMERCIAL

## 2024-11-18 NOTE — PROGRESS NOTES
Replying to Campaign Questionnaire for Overdue HM:  CERVICAL CANCER SCREENING     Message sent to patient informing her that I will put note in to Dr. Fitzgerald to do PAP at upcoming appointment

## 2025-01-03 ENCOUNTER — OFFICE VISIT (OUTPATIENT)
Dept: FAMILY MEDICINE | Facility: CLINIC | Age: 30
End: 2025-01-03
Attending: FAMILY MEDICINE
Payer: COMMERCIAL

## 2025-01-03 ENCOUNTER — LAB VISIT (OUTPATIENT)
Dept: LAB | Facility: HOSPITAL | Age: 30
End: 2025-01-03
Attending: FAMILY MEDICINE
Payer: COMMERCIAL

## 2025-01-03 VITALS
HEART RATE: 67 BPM | OXYGEN SATURATION: 99 % | DIASTOLIC BLOOD PRESSURE: 74 MMHG | BODY MASS INDEX: 30.82 KG/M2 | WEIGHT: 173.94 LBS | SYSTOLIC BLOOD PRESSURE: 116 MMHG | TEMPERATURE: 97 F | HEIGHT: 63 IN

## 2025-01-03 DIAGNOSIS — Z00.00 PREVENTATIVE HEALTH CARE: ICD-10-CM

## 2025-01-03 DIAGNOSIS — Z00.00 PREVENTATIVE HEALTH CARE: Primary | ICD-10-CM

## 2025-01-03 DIAGNOSIS — G43.909 MIGRAINE WITHOUT STATUS MIGRAINOSUS, NOT INTRACTABLE, UNSPECIFIED MIGRAINE TYPE: ICD-10-CM

## 2025-01-03 LAB
ALBUMIN SERPL BCP-MCNC: 3.9 G/DL (ref 3.5–5.2)
ALP SERPL-CCNC: 61 U/L (ref 40–150)
ALT SERPL W/O P-5'-P-CCNC: 14 U/L (ref 10–44)
ANION GAP SERPL CALC-SCNC: 8 MMOL/L (ref 8–16)
AST SERPL-CCNC: 28 U/L (ref 10–40)
BASOPHILS # BLD AUTO: 0.01 K/UL (ref 0–0.2)
BASOPHILS NFR BLD: 0.2 % (ref 0–1.9)
BILIRUB SERPL-MCNC: 0.9 MG/DL (ref 0.1–1)
BUN SERPL-MCNC: 11 MG/DL (ref 6–20)
CALCIUM SERPL-MCNC: 9.3 MG/DL (ref 8.7–10.5)
CHLORIDE SERPL-SCNC: 107 MMOL/L (ref 95–110)
CHOLEST SERPL-MCNC: 176 MG/DL (ref 120–199)
CHOLEST/HDLC SERPL: 2.6 {RATIO} (ref 2–5)
CO2 SERPL-SCNC: 22 MMOL/L (ref 23–29)
CREAT SERPL-MCNC: 1 MG/DL (ref 0.5–1.4)
DIFFERENTIAL METHOD BLD: NORMAL
EOSINOPHIL # BLD AUTO: 0.1 K/UL (ref 0–0.5)
EOSINOPHIL NFR BLD: 1.3 % (ref 0–8)
ERYTHROCYTE [DISTWIDTH] IN BLOOD BY AUTOMATED COUNT: 13 % (ref 11.5–14.5)
EST. GFR  (NO RACE VARIABLE): >60 ML/MIN/1.73 M^2
ESTIMATED AVG GLUCOSE: 105 MG/DL (ref 68–131)
GLUCOSE SERPL-MCNC: 87 MG/DL (ref 70–110)
HBA1C MFR BLD: 5.3 % (ref 4–5.6)
HCT VFR BLD AUTO: 37.8 % (ref 37–48.5)
HDLC SERPL-MCNC: 68 MG/DL (ref 40–75)
HDLC SERPL: 38.6 % (ref 20–50)
HGB BLD-MCNC: 12.3 G/DL (ref 12–16)
IMM GRANULOCYTES # BLD AUTO: 0.01 K/UL (ref 0–0.04)
IMM GRANULOCYTES NFR BLD AUTO: 0.2 % (ref 0–0.5)
LDLC SERPL CALC-MCNC: 98.2 MG/DL (ref 63–159)
LYMPHOCYTES # BLD AUTO: 2.2 K/UL (ref 1–4.8)
LYMPHOCYTES NFR BLD: 46.5 % (ref 18–48)
MCH RBC QN AUTO: 29.4 PG (ref 27–31)
MCHC RBC AUTO-ENTMCNC: 32.5 G/DL (ref 32–36)
MCV RBC AUTO: 90 FL (ref 82–98)
MONOCYTES # BLD AUTO: 0.6 K/UL (ref 0.3–1)
MONOCYTES NFR BLD: 12.4 % (ref 4–15)
NEUTROPHILS # BLD AUTO: 1.9 K/UL (ref 1.8–7.7)
NEUTROPHILS NFR BLD: 39.4 % (ref 38–73)
NONHDLC SERPL-MCNC: 108 MG/DL
NRBC BLD-RTO: 0 /100 WBC
PLATELET # BLD AUTO: 169 K/UL (ref 150–450)
PMV BLD AUTO: 12 FL (ref 9.2–12.9)
POTASSIUM SERPL-SCNC: 4.3 MMOL/L (ref 3.5–5.1)
PROT SERPL-MCNC: 7.6 G/DL (ref 6–8.4)
RBC # BLD AUTO: 4.18 M/UL (ref 4–5.4)
SODIUM SERPL-SCNC: 137 MMOL/L (ref 136–145)
TRIGL SERPL-MCNC: 49 MG/DL (ref 30–150)
WBC # BLD AUTO: 4.75 K/UL (ref 3.9–12.7)

## 2025-01-03 PROCEDURE — 80061 LIPID PANEL: CPT

## 2025-01-03 PROCEDURE — 80053 COMPREHEN METABOLIC PANEL: CPT

## 2025-01-03 PROCEDURE — 83036 HEMOGLOBIN GLYCOSYLATED A1C: CPT

## 2025-01-03 PROCEDURE — 84443 ASSAY THYROID STIM HORMONE: CPT

## 2025-01-03 PROCEDURE — 85025 COMPLETE CBC W/AUTO DIFF WBC: CPT

## 2025-01-03 PROCEDURE — 99999 PR PBB SHADOW E&M-EST. PATIENT-LVL III: CPT | Mod: PBBFAC,,,

## 2025-01-03 RX ORDER — SUMATRIPTAN SUCCINATE 25 MG/1
25 TABLET ORAL DAILY PRN
Qty: 10 TABLET | Refills: 0 | Status: SHIPPED | OUTPATIENT
Start: 2025-01-03 | End: 2025-01-13

## 2025-01-03 NOTE — PROGRESS NOTES
Subjective     Patient ID: Swati Durán is a 29 y.o. female.    Chief Complaint: Annual Exam    Patient presents to clinic for annual visit. States that she is not fasting but diet was light in preparation for lab work today. Chief complaint today is migraine headaches. Patient admits that she started having h/a as a young child. She was prescribed a medication in which she can not remember the name, but she stopped taking it because she did not like the taste. As an adult, her migraines have returned. States that she has has throbbing pains around her eyes and to her forehead. When pain occurs she rates it 8/10. Admits that certain activities, such as laughing, make pain worse. Patient admits to a possible correlation between her menstrual cycle and headaches. LMP was December 6, 2024.  Admits that she takes OTC Excedrin often. Excedrin works to relieve pain but she feels she has to take too many of them now to help with symptoms. Reports that she has recently started taking BC Powder, which works well for her migraine pain; however, she has some difficulty swallowing the medication. Denies changes in vision, dizziness and past or recent injury to head.       Review of Systems   Constitutional:  Negative for activity change, appetite change, chills, fatigue and fever.   HENT:  Negative for nasal congestion, dental problem, hearing loss, postnasal drip, rhinorrhea and sore throat.    Eyes: Negative.  Negative for photophobia and visual disturbance.   Respiratory: Negative.  Negative for cough, chest tightness and shortness of breath.    Cardiovascular: Negative.  Negative for chest pain, palpitations and leg swelling.   Gastrointestinal: Negative.  Negative for abdominal pain, change in bowel habit, nausea and vomiting.   Endocrine: Negative.    Genitourinary: Negative.  Negative for dysuria, frequency, hematuria and menstrual irregularity.   Musculoskeletal: Negative.    Integumentary:  Negative.    Neurological:  Positive for headaches. Negative for dizziness, syncope, weakness, light-headedness and numbness.   Hematological: Negative.    Psychiatric/Behavioral:  Negative for suicidal ideas.           Objective     Physical Exam  Constitutional:       Appearance: Normal appearance.   HENT:      Head: Normocephalic and atraumatic.      Right Ear: Tympanic membrane, ear canal and external ear normal.      Left Ear: Tympanic membrane, ear canal and external ear normal.      Nose: Nose normal.      Mouth/Throat:      Mouth: Mucous membranes are moist.      Pharynx: Oropharynx is clear.   Eyes:      Extraocular Movements: Extraocular movements intact.      Conjunctiva/sclera: Conjunctivae normal.      Pupils: Pupils are equal, round, and reactive to light.   Cardiovascular:      Rate and Rhythm: Normal rate and regular rhythm.      Pulses: Normal pulses.      Heart sounds: Normal heart sounds.   Pulmonary:      Effort: Pulmonary effort is normal.      Breath sounds: Normal breath sounds.   Abdominal:      General: Bowel sounds are normal.      Palpations: Abdomen is soft.   Genitourinary:     Comments: deferred  Musculoskeletal:         General: Normal range of motion.      Cervical back: Normal range of motion and neck supple.   Skin:     General: Skin is warm and dry.      Capillary Refill: Capillary refill takes less than 2 seconds.   Neurological:      General: No focal deficit present.      Mental Status: She is alert and oriented to person, place, and time.   Psychiatric:         Mood and Affect: Mood normal.         Behavior: Behavior normal.         Thought Content: Thought content normal.         Judgment: Judgment normal.            Assessment and Plan     1. Preventative health care  -     Comprehensive Metabolic Panel; Future; Expected date: 01/03/2025  -     LIPID PANEL; Future; Expected date: 01/03/2025  -     CBC Auto Differential; Future; Expected date: 01/03/2025  -     HEMOGLOBIN A1C; Future;  Expected date: 01/03/2025  -     TSH; Future; Expected date: 01/03/2025    2. Migraine without status migrainosus, not intractable, unspecified migraine type  -     sumatriptan (IMITREX) 25 MG Tab; Take 1 tablet (25 mg total) by mouth daily as needed (for migraine not relieved by OTC med).  Dispense: 10 tablet; Refill: 0      Plan:  1) labs today (call for results)  2) Imitrex PRN migraine. Advised to continue OTC NSAIDS/Excedrin PRN  3) consider CT head and referral to neuro for worsening symptoms         Follow up if symptoms worsen or fail to improve.

## 2025-01-04 LAB — TSH SERPL DL<=0.005 MIU/L-ACNC: 0.68 UIU/ML (ref 0.4–4)

## 2025-01-14 ENCOUNTER — HOSPITAL ENCOUNTER (EMERGENCY)
Facility: HOSPITAL | Age: 30
Discharge: HOME | End: 2025-01-14
Attending: EMERGENCY MEDICINE
Payer: COMMERCIAL

## 2025-01-14 VITALS
OXYGEN SATURATION: 98 % | SYSTOLIC BLOOD PRESSURE: 116 MMHG | BODY MASS INDEX: 29.2 KG/M2 | HEIGHT: 64 IN | RESPIRATION RATE: 17 BRPM | TEMPERATURE: 99 F | DIASTOLIC BLOOD PRESSURE: 70 MMHG | WEIGHT: 171.06 LBS | HEART RATE: 89 BPM

## 2025-01-14 DIAGNOSIS — K52.9 GASTROENTERITIS: Primary | ICD-10-CM

## 2025-01-14 LAB
B-HCG UR QL: NEGATIVE
BILIRUB UR QL STRIP: NEGATIVE
CLARITY UR: CLEAR
COLOR UR: YELLOW
GLUCOSE UR QL STRIP: NEGATIVE
HGB UR QL STRIP: NEGATIVE
INFLUENZA A, MOLECULAR: NEGATIVE
INFLUENZA B, MOLECULAR: NEGATIVE
KETONES UR QL STRIP: NEGATIVE
LEUKOCYTE ESTERASE UR QL STRIP: NEGATIVE
NITRITE UR QL STRIP: NEGATIVE
PH UR STRIP: 8 [PH] (ref 5–8)
PROT UR QL STRIP: NEGATIVE
SARS-COV-2 RDRP RESP QL NAA+PROBE: NEGATIVE
SP GR UR STRIP: 1.02 (ref 1–1.03)
SPECIMEN SOURCE: NORMAL
URN SPEC COLLECT METH UR: NORMAL
UROBILINOGEN UR STRIP-ACNC: NEGATIVE EU/DL

## 2025-01-14 PROCEDURE — 81003 URINALYSIS AUTO W/O SCOPE: CPT | Performed by: EMERGENCY MEDICINE

## 2025-01-14 PROCEDURE — 81025 URINE PREGNANCY TEST: CPT | Performed by: EMERGENCY MEDICINE

## 2025-01-14 PROCEDURE — 25000003 PHARM REV CODE 250

## 2025-01-14 PROCEDURE — 87502 INFLUENZA DNA AMP PROBE: CPT | Performed by: EMERGENCY MEDICINE

## 2025-01-14 PROCEDURE — 87635 SARS-COV-2 COVID-19 AMP PRB: CPT | Performed by: EMERGENCY MEDICINE

## 2025-01-14 PROCEDURE — 99283 EMERGENCY DEPT VISIT LOW MDM: CPT

## 2025-01-14 RX ORDER — ONDANSETRON 4 MG/1
4 TABLET, ORALLY DISINTEGRATING ORAL
Status: COMPLETED | OUTPATIENT
Start: 2025-01-14 | End: 2025-01-14

## 2025-01-14 RX ORDER — ONDANSETRON HYDROCHLORIDE 2 MG/ML
4 INJECTION, SOLUTION INTRAVENOUS
Status: DISCONTINUED | OUTPATIENT
Start: 2025-01-14 | End: 2025-01-14

## 2025-01-14 RX ORDER — ONDANSETRON 4 MG/1
4 TABLET, ORALLY DISINTEGRATING ORAL 2 TIMES DAILY
Qty: 20 TABLET | Refills: 0 | Status: SHIPPED | OUTPATIENT
Start: 2025-01-14 | End: 2025-01-24

## 2025-01-14 RX ADMIN — ONDANSETRON 4 MG: 4 TABLET, ORALLY DISINTEGRATING ORAL at 03:01

## 2025-01-14 NOTE — FIRST PROVIDER EVALUATION
"Medical screening examination initiated.  I have conducted a focused provider triage encounter, findings are as follows:    Brief history of present illness:  29-year-old female presenting to the emergency department with complaints of nausea, vomiting, diarrhea x6 hours.  Patient just returned from a cruise; states she may be raw chicken.  Also reports subjective fever and chills.  [facility] 01/03/2025.    Vitals:    01/14/25 0142   BP: 118/71   BP Location: Right arm   Pulse: 96   Resp: 17   Temp: 99.1 °F (37.3 °C)   TempSrc: Oral   SpO2: 97%   Weight: 77.6 kg (171 lb 1.2 oz)   Height: 5' 3.5" (1.613 m)       Pertinent physical exam:  Vital signs stable.  No acute distress.  Breaths are even and unlabored.  No adventitious lung sounds.  Regular rate and rhythm.  Abdomen is nondistended nontender to palpation.    Brief workup plan:  Preliminary workup initiated; this workup will be continued and followed by the physician or advanced practice provider that is assigned to the patient when roomed.  "

## 2025-01-14 NOTE — ED PROVIDER NOTES
Encounter Date: 1/14/2025       History     Chief Complaint   Patient presents with    Nausea     Pt c/o NVD & gen abd pain x6hrs. Pt reports ate pink chicken on cruise last night     29-year-old female presenting to the emergency department with complaints of generalized abdominal pain with nausea, vomiting, diarrhea x6 hours.  Patient states that she got off a cruise yesterday.  States that she may have eaten some bad chicken while she was on the cruise.  She reports decreased oral intake secondary to symptom.  Denies fever, chills, headache, syncope, chest pain, palpitations, shortness of breath, blood in her stool, mucus in her stool, hemoptysis, dysuria, and all other symptoms at this time.  FD LMP 01/03/2025.     The history is provided by the patient.     Review of patient's allergies indicates:   Allergen Reactions    Food allergy formula  [glutamine-c-quercet-selen-brom] Hives     Past Medical History:   Diagnosis Date    Goiter 10/14/2020     Past Surgical History:   Procedure Laterality Date    None       Family History   Problem Relation Name Age of Onset    Hypertension Mother      Other Mother          borderline diabetes    No Known Problems Father      No Known Problems Sister      No Known Problems Brother      Breast cancer Maternal Grandmother      Other Maternal Grandmother          borderline diabetes    Coronary artery disease Maternal Grandmother      Lung cancer Paternal Grandfather      Stroke Neg Hx       Social History     Tobacco Use    Smoking status: Never    Smokeless tobacco: Never   Substance Use Topics    Alcohol use: Yes     Alcohol/week: 0.0 standard drinks of alcohol     Comment: Occasionally    Drug use: No     Review of Systems   Constitutional:  Negative for chills and fever.   HENT:  Negative for sore throat.    Respiratory:  Negative for shortness of breath.    Cardiovascular:  Negative for chest pain.   Gastrointestinal:  Positive for abdominal pain, diarrhea, nausea and  vomiting. Negative for abdominal distention, anal bleeding, blood in stool, constipation and rectal pain.   Genitourinary:  Negative for dysuria.   Musculoskeletal:  Negative for back pain.   Skin:  Negative for rash.   Neurological:  Negative for dizziness, syncope, weakness, light-headedness and headaches.   Hematological:  Does not bruise/bleed easily.   All other systems reviewed and are negative.      Physical Exam     Initial Vitals [01/14/25 0142]   BP Pulse Resp Temp SpO2   118/71 96 17 99.1 °F (37.3 °C) 97 %      MAP       --         Physical Exam    Nursing note reviewed.  Constitutional: She appears well-developed and well-nourished.  Non-toxic appearance. She does not have a sickly appearance. She does not appear ill. No distress.   HENT:   Head: Normocephalic and atraumatic.   Right Ear: Hearing normal.   Left Ear: Hearing normal.   Nose: Nose normal. Mouth/Throat: Uvula is midline and oropharynx is clear and moist.   Eyes: Conjunctivae, EOM and lids are normal. Pupils are equal, round, and reactive to light.   Neck: Trachea normal. Neck supple.   Normal range of motion.   Full passive range of motion without pain.     Cardiovascular:  Normal rate, regular rhythm, normal heart sounds, intact distal pulses and normal pulses.           Pulmonary/Chest: Effort normal and breath sounds normal. No respiratory distress. She has no wheezes. She has no rhonchi. She has no rales.   Abdominal: Abdomen is soft. Bowel sounds are normal. She exhibits no distension. There is no abdominal tenderness. There is no rebound and no guarding.   Musculoskeletal:         General: Normal range of motion.      Cervical back: Normal, full passive range of motion without pain, normal range of motion and neck supple.     Neurological: She is alert and oriented to person, place, and time. She has normal strength and normal reflexes. No cranial nerve deficit or sensory deficit. GCS eye subscore is 4. GCS verbal subscore is 5. GCS  motor subscore is 6.   Skin: Skin is warm and dry.   Psychiatric: She has a normal mood and affect. Her behavior is normal. Thought content normal.         ED Course   Procedures  Labs Reviewed   INFLUENZA A & B BY MOLECULAR       Result Value    Influenza A, Molecular Negative      Influenza B, Molecular Negative      Flu A & B Source Nasal swab     URINALYSIS, REFLEX TO URINE CULTURE    Specimen UA Urine, Clean Catch      Color, UA Yellow      Appearance, UA Clear      pH, UA 8.0      Specific Gravity, UA 1.025      Protein, UA Negative      Glucose, UA Negative      Ketones, UA Negative      Bilirubin (UA) Negative      Occult Blood UA Negative      Nitrite, UA Negative      Urobilinogen, UA Negative      Leukocytes, UA Negative      Narrative:     Specimen Source->Urine   PREGNANCY TEST, URINE RAPID    Preg Test, Ur Negative      Narrative:     Specimen Source->Urine   SARS-COV-2 RNA AMPLIFICATION, QUAL    SARS-CoV-2 RNA, Amplification, Qual Negative     HEPATITIS C ANTIBODY   HEP C VIRUS HOLD SPECIMEN   HIV 1 / 2 ANTIBODY          Imaging Results    None          Medications   ondansetron disintegrating tablet 4 mg (4 mg Oral Given 1/14/25 0355)     Medical Decision Making  Risk  Prescription drug management.  Risk Details: I offered to start an IV and give the patient 500 mL of normal saline.  Patient politely declined at this time.  Patient has a positive response to Zofran. Advised patient to: drink 8 to 10 glasses of clear fluids every day; drink at least 1 cup of liquid after every loose bowel movement; eat small meals throughout the day;  consume foods and beverages containing sodium, such as pretzels, soup, and sports drinks; eat high potassium foods, such as bananas, potatoes without the skin, and watered-down fruit juices; and to get plenty of rest. Patient advised to follow up with primary care provider or return to the ER if they experience: blood or pus in stools; black stools; stomach pain that  does not go away after a bowel movement; symptoms of dehydration (thirst, dizziness, lightheadedness);  diarrhea with a fever above 101°F (100.4 °F in children); and if the diarrhea gets worse or does not improve in 2 days. Patient was also encouraged to utilize Pepto-Bismol after each bowel movement.                                        Clinical Impression:  Final diagnoses:  [K52.9] Gastroenteritis (Primary)          ED Disposition Condition    Discharge Stable          ED Prescriptions       Medication Sig Dispense Start Date End Date Auth. Provider    ondansetron (ZOFRAN-ODT) 4 MG TbDL Take 1 tablet (4 mg total) by mouth 2 (two) times daily. for 10 days 20 tablet 1/14/2025 1/24/2025 Yari Collier PA-C          Follow-up Information       Follow up With Specialties Details Why Contact Info    [facility] - Emergency Dept. Emergency Medicine  If symptoms worsen [id number] [facility] [address] [phone]             Yari Collier PA-C  01/14/25 8600

## 2025-01-14 NOTE — Clinical Note
"Swati Olsona" Leeanna was seen and treated in our emergency department on 1/14/2025.  She may return to work on 01/17/2025.       If you have any questions or concerns, please don't hesitate to call.      Yari Collier PA-C"

## 2025-01-17 ENCOUNTER — OFFICE VISIT (OUTPATIENT)
Dept: INTERNAL MEDICINE | Facility: CLINIC | Age: 30
End: 2025-01-17
Payer: COMMERCIAL

## 2025-01-17 ENCOUNTER — LAB VISIT (OUTPATIENT)
Dept: LAB | Facility: HOSPITAL | Age: 30
End: 2025-01-17
Attending: NURSE PRACTITIONER
Payer: COMMERCIAL

## 2025-01-17 VITALS
TEMPERATURE: 97 F | WEIGHT: 171.31 LBS | OXYGEN SATURATION: 100 % | HEART RATE: 86 BPM | SYSTOLIC BLOOD PRESSURE: 110 MMHG | DIASTOLIC BLOOD PRESSURE: 72 MMHG | HEIGHT: 64 IN | BODY MASS INDEX: 29.24 KG/M2

## 2025-01-17 DIAGNOSIS — K52.9 GASTROENTERITIS: ICD-10-CM

## 2025-01-17 DIAGNOSIS — R10.84 GENERALIZED ABDOMINAL PAIN: ICD-10-CM

## 2025-01-17 DIAGNOSIS — H11.33 SUBCONJUNCTIVAL HEMORRHAGE OF BOTH EYES: ICD-10-CM

## 2025-01-17 DIAGNOSIS — Z09 HOSPITAL DISCHARGE FOLLOW-UP: Primary | ICD-10-CM

## 2025-01-17 LAB
ALBUMIN SERPL BCP-MCNC: 3.9 G/DL (ref 3.5–5.2)
ALP SERPL-CCNC: 62 U/L (ref 40–150)
ALT SERPL W/O P-5'-P-CCNC: 16 U/L (ref 10–44)
ANION GAP SERPL CALC-SCNC: 8 MMOL/L (ref 8–16)
AST SERPL-CCNC: 23 U/L (ref 10–40)
BILIRUB SERPL-MCNC: 0.7 MG/DL (ref 0.1–1)
BUN SERPL-MCNC: 12 MG/DL (ref 6–20)
CALCIUM SERPL-MCNC: 9.2 MG/DL (ref 8.7–10.5)
CHLORIDE SERPL-SCNC: 104 MMOL/L (ref 95–110)
CO2 SERPL-SCNC: 27 MMOL/L (ref 23–29)
CREAT SERPL-MCNC: 0.9 MG/DL (ref 0.5–1.4)
EST. GFR  (NO RACE VARIABLE): >60 ML/MIN/1.73 M^2
GLUCOSE SERPL-MCNC: 84 MG/DL (ref 70–110)
POTASSIUM SERPL-SCNC: 3.8 MMOL/L (ref 3.5–5.1)
PROT SERPL-MCNC: 7.4 G/DL (ref 6–8.4)
SODIUM SERPL-SCNC: 139 MMOL/L (ref 136–145)

## 2025-01-17 PROCEDURE — 3008F BODY MASS INDEX DOCD: CPT | Mod: CPTII,S$GLB,, | Performed by: NURSE PRACTITIONER

## 2025-01-17 PROCEDURE — 1159F MED LIST DOCD IN RCRD: CPT | Mod: CPTII,S$GLB,, | Performed by: NURSE PRACTITIONER

## 2025-01-17 PROCEDURE — 99999 PR PBB SHADOW E&M-EST. PATIENT-LVL III: CPT | Mod: PBBFAC,,, | Performed by: NURSE PRACTITIONER

## 2025-01-17 PROCEDURE — 80053 COMPREHEN METABOLIC PANEL: CPT | Performed by: NURSE PRACTITIONER

## 2025-01-17 PROCEDURE — 3074F SYST BP LT 130 MM HG: CPT | Mod: CPTII,S$GLB,, | Performed by: NURSE PRACTITIONER

## 2025-01-17 PROCEDURE — 36415 COLL VENOUS BLD VENIPUNCTURE: CPT | Performed by: NURSE PRACTITIONER

## 2025-01-17 PROCEDURE — 3044F HG A1C LEVEL LT 7.0%: CPT | Mod: CPTII,S$GLB,, | Performed by: NURSE PRACTITIONER

## 2025-01-17 PROCEDURE — 99214 OFFICE O/P EST MOD 30 MIN: CPT | Mod: S$GLB,,, | Performed by: NURSE PRACTITIONER

## 2025-01-17 PROCEDURE — G2211 COMPLEX E/M VISIT ADD ON: HCPCS | Mod: S$GLB,,, | Performed by: NURSE PRACTITIONER

## 2025-01-17 PROCEDURE — 3078F DIAST BP <80 MM HG: CPT | Mod: CPTII,S$GLB,, | Performed by: NURSE PRACTITIONER

## 2025-01-17 PROCEDURE — 1160F RVW MEDS BY RX/DR IN RCRD: CPT | Mod: CPTII,S$GLB,, | Performed by: NURSE PRACTITIONER

## 2025-01-17 RX ORDER — AZITHROMYCIN 250 MG/1
TABLET, FILM COATED ORAL
Qty: 6 TABLET | Refills: 0 | Status: SHIPPED | OUTPATIENT
Start: 2025-01-17 | End: 2025-01-22

## 2025-01-17 RX ORDER — DICYCLOMINE HYDROCHLORIDE 20 MG/1
20 TABLET ORAL EVERY 6 HOURS
Qty: 28 TABLET | Refills: 0 | Status: SHIPPED | OUTPATIENT
Start: 2025-01-17 | End: 2025-01-24

## 2025-01-17 NOTE — LETTER
January 17, 2025      [facility] [address]  [address] [address] [facility]  [place] [address]  Phone: [phone]  Fax: [phone]       Patient: Swati Durán   YOB: 1995  Date of Visit: 01/17/2025    To Whom It May Concern:    Morgan Durán  was at [facility] on 01/17/2025. The patient may return to work/school on 1/20/2024 with no restrictions. If you have any questions or concerns, or if I can be of further assistance, please do not hesitate to contact me.    Sincerely,    Amadeo Chan NP

## 2025-01-22 NOTE — PROGRESS NOTES
"  Subjective:      Patient ID: Swati Durán is a 29 y.o. female.    Chief Complaint: Follow-up    History of Present Illness    CHIEF COMPLAINT:  Swati presents today for follow-up after experiencing GI symptoms following a cruise.    HISTORY OF PRESENT ILLNESS:  She reports ongoing GI symptoms following recent travel to [facility] yesterday. She experiences intermittent but severe abdominal pain described as a pulling sensation in one area with cramping throughout the abdomen. She also reports nausea with last episode of vomiting on Tuesday, and persistent diarrhea with most recent occurrence on Thursday. She endorses a burning sensation in her chest and throat but denies current nausea.    ASSOCIATED SYMPTOMS:  She reports bilateral eye redness that began 2 days ago, initially mild but progressively worsening. She denies vision changes, double vision, or black spots. She also notes facial itching and peeling around the nose. She feels subjectively "high" but denies measured fever.    MEDICATIONS:  She takes Zofran as prescribed for nausea management. She uses Ponds moisturizer daily for skin care, noting previous ineffectiveness and dryness with Cetaphil.    TRAVEL HISTORY:  She recently returned from [facility] yesterday. Prior travel includes [place] in 2021.      ROS:  General: -fever, -chills, -fatigue, -weight gain, -weight loss  Eyes: -vision changes, -redness, -discharge  ENT: -ear pain, -nasal congestion, -sore throat  Cardiovascular: -chest pain, -palpitations, -lower extremity edema  Respiratory: -cough, -shortness of breath  Gastrointestinal: +abdominal pain, +nausea, +vomiting, +diarrhea, -constipation, -blood in stool, +heartburn  Genitourinary: -dysuria, -hematuria, -frequency  Musculoskeletal: -joint pain, -muscle pain  Skin: -rash, -lesion  Neurological: -headache, -dizziness, -numbness, -tingling  Psychiatric: -anxiety, -depression, -sleep difficulty          Patient Active Problem List " "  Diagnosis    Chronic dislocation of right shoulder    Myofacial muscle pain    Rotator cuff tendinitis    Winged scapula of right side    Recurrent subluxation of shoulder    Instability of right shoulder joint    Goiter    Moderate left ankle sprain    Overweight (BMI 25.0-29.9)    Decreased functional activity tolerance    Gait abnormality    Balance problem    Decreased strength    Migraine without status migrainosus, not intractable         Current Outpatient Medications:     dexbrompheniramine-phenyleph (ALAHIST PE) 2-7.5 mg Tab, Take 1 tablet by mouth every 6 (six) hours as needed (sinus congestion)., Disp: 30 tablet, Rfl: 2    ondansetron (ZOFRAN-ODT) 4 MG TbDL, Take 1 tablet (4 mg total) by mouth 2 (two) times daily. for 10 days, Disp: 20 tablet, Rfl: 0    traMADoL (ULTRAM) 50 mg tablet, Take 1 tablet (50 mg total) by mouth every 6 (six) hours as needed for Pain., Disp: 12 tablet, Rfl: 0    azithromycin (Z-YANET) 250 MG tablet, Take 2 tablets by mouth on day 1; Take 1 tablet by mouth on days 2-5, Disp: 6 tablet, Rfl: 0    dicyclomine (BENTYL) 20 mg tablet, Take 1 tablet (20 mg total) by mouth every 6 (six) hours. for 7 days, Disp: 28 tablet, Rfl: 0      Objective:   /72 (BP Location: Left arm, Patient Position: Sitting)   Pulse 86   Temp 97 °F (36.1 °C) (Tympanic)   Ht 5' 3.5" (1.613 m)   Wt 77.7 kg (171 lb 4.8 oz)   LMP 01/03/2025 (Exact Date)   SpO2 100%   BMI 29.87 kg/m²     Physical Exam             Physical Exam  Vitals and nursing note reviewed.   Constitutional:       General: She is awake. She is not in acute distress.     Appearance: Normal appearance. She is well-developed, well-groomed and normal weight. She is not ill-appearing, toxic-appearing or diaphoretic.   HENT:      Head: Normocephalic and atraumatic.      Right Ear: External ear normal.      Left Ear: External ear normal.   Eyes:      Pupils: Pupils are equal, round, and reactive to light.   Cardiovascular:      Rate and " Rhythm: Normal rate and regular rhythm.      Heart sounds: Normal heart sounds. No murmur heard.  Pulmonary:      Effort: Pulmonary effort is normal. No tachypnea, bradypnea, accessory muscle usage, prolonged expiration, respiratory distress or retractions.      Breath sounds: Normal breath sounds. No stridor, decreased air movement or transmitted upper airway sounds. No decreased breath sounds, wheezing, rhonchi or rales.   Abdominal:      General: Abdomen is flat. Bowel sounds are normal. There is no distension.      Palpations: Abdomen is soft. There is no hepatomegaly, splenomegaly or mass.      Tenderness: There is abdominal tenderness. There is no right CVA tenderness, left CVA tenderness, guarding or rebound. Negative signs include Le's sign and McBurney's sign.      Hernia: No hernia is present.   Musculoskeletal:         General: No swelling, tenderness, deformity or signs of injury.      Cervical back: Normal range of motion and neck supple.      Right lower leg: No edema.      Left lower leg: No edema.   Skin:     General: Skin is warm and dry.      Capillary Refill: Capillary refill takes less than 2 seconds.   Neurological:      General: No focal deficit present.      Mental Status: She is alert and oriented to person, place, and time.      Gait: Gait normal.   Psychiatric:         Attention and Perception: Attention and perception normal.         Mood and Affect: Mood and affect normal.         Speech: Speech normal.         Behavior: Behavior normal. Behavior is cooperative.         Thought Content: Thought content normal.          Assessment/Plan :   1. Hospital discharge follow-up    2. Gastroenteritis  -     Stool culture; Future; Expected date: 01/17/2025  -     CLOSTRIDIUM DIFFICILE; Future; Expected date: 01/17/2025  -     Stool Exam-Ova,Cysts,Parasites; Future; Expected date: 01/17/2025  -     azithromycin (Z-YANET) 250 MG tablet; Take 2 tablets by mouth on day 1; Take 1 tablet by mouth on  days 2-5  Dispense: 6 tablet; Refill: 0  -     Urinalysis Microscopic; Future; Expected date: 01/17/2025  -     COMPREHENSIVE METABOLIC PANEL; Future; Expected date: 01/17/2025    3. Generalized abdominal pain  -     dicyclomine (BENTYL) 20 mg tablet; Take 1 tablet (20 mg total) by mouth every 6 (six) hours. for 7 days  Dispense: 28 tablet; Refill: 0  -     Urinalysis Microscopic; Future; Expected date: 01/17/2025  -     COMPREHENSIVE METABOLIC PANEL; Future; Expected date: 01/17/2025    4. Subconjunctival hemorrhage of both eyes         Assessment & Plan    Suspected norovirus or food poisoning from cruise  Considered possibility of parasitic infection given travel history  Assessed for dehydration; kidney and liver function normal based on previous labs    GASTROENTERITIS:  - Explained that eye redness likely due to burst blood vessels from forceful vomiting.  - Educated on importance of hydration and appropriate diet during recovery from GI illness.  - Swati to hydrate with 2.2 to 4 L of water within 4 hours.  - Swati to follow BRAT diet (bananas, rice, applesauce, toast) if diarrhea continues.  - Recommend clear liquids only for 24 hours, then advance to bland diet.  - Started Bentyl for abdominal cramps, to be taken every 6 hours.  - Started Tylenol 1 g every 6 hours.  - Continued Zofran as previously prescribed for nausea.  - Started Azithromycin to be taken with food to avoid nausea.  - Stool culture ordered.    DEHYDRATION:  - Blood draw ordered to assess for dehydration.    LABS:  - Urinalysis ordered.    WORK ABSENCE:  - Swati to return to work on Wednesday.  - Doctor's note provided for work absence.    FOLLOW UP:  - Follow up as needed if symptoms worsen or do not improve.          No follow-ups on file.    This note was generated with the assistance of ambient listening technology. Verbal consent was obtained by the patient and accompanying visitor(s) for the recording of patient appointment to  facilitate this note. I attest to having reviewed and edited the generated note for accuracy, though some syntax or spelling errors may persist. Please contact the author of this note for any clarification.

## 2025-01-28 ENCOUNTER — TELEPHONE (OUTPATIENT)
Dept: FAMILY MEDICINE | Facility: CLINIC | Age: 30
End: 2025-01-28

## 2025-01-28 NOTE — TELEPHONE ENCOUNTER
Patient messaged today - menses started today and needs to reschedule pap smear with you. Please contact her. Thanks.

## 2025-01-28 NOTE — TELEPHONE ENCOUNTER
----- Message from Melody sent at 1/28/2025  9:47 AM CST -----  Type:  Patient Return Call  requested    Who Called:Swati  Who Left Message for Patient:  Does the patient know what this is regarding?:Appointment today 01/28/25  Would the patient rather a call back or a response via [facility]? Callback  Best Call Back Number:[phone]  Additional Information: Patient has a question for the office before appointment

## 2025-02-11 ENCOUNTER — OFFICE VISIT (OUTPATIENT)
Dept: FAMILY MEDICINE | Facility: CLINIC | Age: 30
End: 2025-02-11
Payer: COMMERCIAL

## 2025-02-11 VITALS
BODY MASS INDEX: 30.46 KG/M2 | HEART RATE: 71 BPM | SYSTOLIC BLOOD PRESSURE: 116 MMHG | DIASTOLIC BLOOD PRESSURE: 78 MMHG | HEIGHT: 63 IN | OXYGEN SATURATION: 100 % | WEIGHT: 171.94 LBS | TEMPERATURE: 98 F

## 2025-02-11 DIAGNOSIS — Z11.3 SCREEN FOR STD (SEXUALLY TRANSMITTED DISEASE): ICD-10-CM

## 2025-02-11 DIAGNOSIS — Z01.419 WELL WOMAN EXAM: Primary | ICD-10-CM

## 2025-02-11 DIAGNOSIS — Z12.4 SCREENING FOR CERVICAL CANCER: ICD-10-CM

## 2025-02-11 PROCEDURE — 99999 PR PBB SHADOW E&M-EST. PATIENT-LVL III: CPT | Mod: PBBFAC,,,

## 2025-02-11 PROCEDURE — 87624 HPV HI-RISK TYP POOLED RSLT: CPT

## 2025-02-11 NOTE — PROGRESS NOTES
Swati Durán  02/11/2025  [id number]    Danika Fitzgerald MD  Patient Care Team:  Danika Fitzgerald MD as PCP - General (Family Medicine)     Subjective      Chief Complaint:  Chief Complaint   Patient presents with    Gynecologic Exam       History of Present Illness:    Ms. Durán presents today for a pap smear. She reports minimal vaginal discharge without discomfort or irritation. She is not currently using any form of birth control and has no children.      Review of Systems  Review of Systems   Genitourinary:  Positive for menstrual irregularity. Negative for dysuria, vaginal discharge and vaginal pain.   Integumentary:  Negative for breast mass, breast discharge and breast tenderness.   All other systems reviewed and are negative.  Breast: Negative for mass and tenderness       History:  Past Medical History:   [facility]     Past Surgical History:   Procedure Laterality Date    None       Family History   Problem Relation Name Age of Onset    Hypertension Mother      Other Mother          borderline diabetes    No Known Problems Father      No Known Problems Sister      No Known Problems Brother      Breast cancer Maternal Grandmother      Other Maternal Grandmother          borderline diabetes    Coronary artery disease Maternal Grandmother      Lung cancer Paternal Grandfather      Stroke Neg Hx       Social History     Socioeconomic History    Marital status: Single   Tobacco Use    Smoking status: Never    Smokeless tobacco: Never   Substance and Sexual Activity    Alcohol use: Yes     Alcohol/week: 0.0 standard drinks of alcohol     Comment: Occasionally    Drug use: No    Sexual activity: Yes     Partners: Male     Birth control/protection: Condom   Social History Narrative    She wear seatbelt. 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     Social Drivers of Health     Financial Resource Strain: High Risk (5/31/2024)    Overall Financial Resource Strain (CARDIA)     Difficulty  of Paying Living Expenses: Hard   Food Insecurity: Food Insecurity Present (5/31/2024)    Hunger Vital Sign     Worried About Running Out of Food in the Last Year: Sometimes true     Ran Out of Food in the Last Year: Sometimes true   Transportation Needs: No Transportation Needs (10/11/2021)    PRAPARE - Transportation     Lack of Transportation (Medical): No     Lack of Transportation (Non-Medical): No   Physical Activity: Sufficiently Active (5/31/2024)    Exercise Vital Sign     Days of Exercise per Week: 4 days     Minutes of Exercise per Session: 90 min   Stress: Stress Concern Present (5/31/2024)    [employer] - Occupational Stress Questionnaire     Feeling of Stress : To some extent   Housing Stability: High Risk (5/31/2024)    Housing Stability Vital Sign     Unable to Pay for Housing in the Last Year: Yes        Review of patient's allergies indicates:   Allergen Reactions    Food allergy formula  [glutamine-c-quercet-selen-brom] Hives       **The following were reviewed at this visit: active problem list, medication list, allergies, family history, social history, and health maintenance.    Medications:  Current Outpatient Medications on File Prior to Visit   Medication Sig Dispense Refill    dexbrompheniramine-phenyleph (ALAHIST PE) 2-7.5 mg Tab Take 1 tablet by mouth every 6 (six) hours as needed (sinus congestion). (Patient not taking: Reported on 2/11/2025) 30 tablet 2    traMADoL (ULTRAM) 50 mg tablet Take 1 tablet (50 mg total) by mouth every 6 (six) hours as needed for Pain. (Patient not taking: Reported on 2/11/2025) 12 tablet 0     No current facility-administered medications on file prior to visit.       **Medications have been reviewed and reconciled with patient at this visit.            Objective      Vitals:    02/11/25 1526   BP: 116/78   Pulse: 71   Temp: 98.2 °F (36.8 °C)      Body mass index is 30.46 kg/m².    Wt Readings from Last 3 Encounters:   02/11/25 78  kg (171 lb 15.3 oz)   01/17/25 77.7 kg (171 lb 4.8 oz)   01/14/25 77.6 kg (171 lb 1.2 oz)     Temp Readings from Last 3 Encounters:   02/11/25 98.2 °F (36.8 °C) (Tympanic)   01/17/25 97 °F (36.1 °C) (Tympanic)   01/14/25 99.1 °F (37.3 °C) (Oral)     BP Readings from Last 3 Encounters:   02/11/25 116/78   01/17/25 110/72   01/14/25 116/70     Pulse Readings from Last 3 Encounters:   02/11/25 71   01/17/25 86   01/14/25 89         Laboratory Reviewed ({Yes)  Lab Results   Component Value Date    WBC 4.75 01/03/2025    HGB 12.3 01/03/2025    HCT 37.8 01/03/2025     01/03/2025    CHOL 176 01/03/2025    TRIG 49 01/03/2025    HDL 68 01/03/2025    ALT 16 01/17/2025    AST 23 01/17/2025     01/17/2025    K 3.8 01/17/2025     01/17/2025    CREATININE 0.9 01/17/2025    BUN 12 01/17/2025    CO2 27 01/17/2025    TSH 0.684 01/03/2025    HGBA1C 5.3 01/03/2025       Physical Exam  Vitals and nursing note reviewed.   HENT:      Head: Normocephalic and atraumatic.   Eyes:      Extraocular Movements: Extraocular movements intact.      Conjunctiva/sclera: Conjunctivae normal.   Cardiovascular:      Rate and Rhythm: Normal rate and regular rhythm.      Pulses: Normal pulses.      Heart sounds: Normal heart sounds.   Pulmonary:      Effort: Pulmonary effort is normal.      Breath sounds: Normal breath sounds.   Abdominal:      General: Bowel sounds are normal.      Palpations: Abdomen is soft.   Genitourinary:     Vagina: Vaginal discharge present.      Cervix: No cervical motion tenderness or friability.      Adnexa: Right adnexa normal and left adnexa normal.      Rectum: Normal.   Musculoskeletal:         General: Normal range of motion.   Skin:     General: Skin is warm and dry.   Neurological:      General: No focal deficit present.      Mental Status: She is oriented to person, place, and time.   Psychiatric:         Mood and Affect: Mood normal.         Behavior: Behavior normal.         Thought Content:  Thought content normal.         Judgment: Judgment normal.               Assessment      Swati was seen today for gynecologic exam.    Diagnoses and all orders for this visit:    Well woman exam  -     Liquid-Based Pap Smear, Screening  -     HPV High Risk Genotypes, PCR  -     C. trachomatis/N. gonorrhoeae by AMP DNA      Screening for cervical cancer  -     Liquid-Based Pap Smear, Screening  -     HPV High Risk Genotypes, PCR         Plan    1) continue present management  2) discussed contraceptive methods. Patient refuses  3) Paps every 3 years pending results      Follow up: Follow up in about 1 year (around 2/11/2026).    **After visit summary was printed and given to patient upon discharge today.  Patient goals and care plan are included in After Visit Summary.    I spent a total of 20 minutes on the day of the visit.This includes face to face time and non-face to face time preparing to see the patient (eg, review of tests), obtaining and/or reviewing separately obtained history, documenting clinical information in the electronic or other health record, independently interpreting results and communicating results to the patient/family/caregiver, or care coordinator.      **This note was generated with the assistance of ambient listening technology. Verbal consent was obtained by the patient and accompanying visitor(s) for the recording of patient appointment to facilitate this note. I attest to having reviewed and edited the generated note for accuracy, though some syntax or spelling errors may persist. Please contact the author of this note for any clarification.         Sherman Ford, MSN, APRN, FNP-C

## 2025-08-05 ENCOUNTER — OFFICE VISIT (OUTPATIENT)
Dept: INTERNAL MEDICINE | Facility: CLINIC | Age: 30
End: 2025-08-05
Payer: COMMERCIAL

## 2025-08-05 VITALS
WEIGHT: 170.88 LBS | TEMPERATURE: 98 F | DIASTOLIC BLOOD PRESSURE: 74 MMHG | HEIGHT: 63 IN | BODY MASS INDEX: 30.28 KG/M2 | HEART RATE: 90 BPM | OXYGEN SATURATION: 98 % | SYSTOLIC BLOOD PRESSURE: 118 MMHG

## 2025-08-05 DIAGNOSIS — G89.29 CHRONIC LEFT-SIDED LOW BACK PAIN WITHOUT SCIATICA: ICD-10-CM

## 2025-08-05 DIAGNOSIS — M76.61 TENDONITIS, ACHILLES, RIGHT: Primary | ICD-10-CM

## 2025-08-05 DIAGNOSIS — M54.50 CHRONIC LEFT-SIDED LOW BACK PAIN WITHOUT SCIATICA: ICD-10-CM

## 2025-08-05 PROCEDURE — 99999 PR PBB SHADOW E&M-EST. PATIENT-LVL IV: CPT | Mod: PBBFAC,,,

## 2025-08-05 PROCEDURE — 3008F BODY MASS INDEX DOCD: CPT | Mod: CPTII,S$GLB,,

## 2025-08-05 PROCEDURE — 3044F HG A1C LEVEL LT 7.0%: CPT | Mod: CPTII,S$GLB,,

## 2025-08-05 PROCEDURE — 99214 OFFICE O/P EST MOD 30 MIN: CPT | Mod: S$GLB,,,

## 2025-08-05 PROCEDURE — 3078F DIAST BP <80 MM HG: CPT | Mod: CPTII,S$GLB,,

## 2025-08-05 PROCEDURE — 1159F MED LIST DOCD IN RCRD: CPT | Mod: CPTII,S$GLB,,

## 2025-08-05 PROCEDURE — 3074F SYST BP LT 130 MM HG: CPT | Mod: CPTII,S$GLB,,

## 2025-08-05 NOTE — PROGRESS NOTES
Patient ID: Swati Durán is a 29 y.o. female.    Chief Complaint: Ankle Pain and Back Pain    History of Present Illness    CHIEF COMPLAINT:  - Ms. Durán presents with ankle pain and low back pain.    HPI:  Ms. Durán reports pain in the back of her ankle for approximately 1 month, localized to the Achilles tendon area. The pain is exacerbated by pressure and when going up on toes. Ms. Durán has significant difficulty when getting out of bed and stepping down on the affected foot. She has tried using ice and heat for relief, with heat providing more benefit. Ms. Durán also used a massage gun, which caused pain due to the pressure applied.    Ms. Durán has a history of an ankle sprain in 2023, which affected the front of the ankle rather than the current posterior location. She underwent physical therapy for her ankles last year. The current ankle pain began without any specific trauma or injury.    Ms. Durán mentions low back pain localized to a specific area and aggravated by twisting movements. There is no pain associated with urination or other concerning symptoms.    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    Ms. Durán denies pain in other areas of the ankle besides the Achilles tendon region. She denies fever or pain radiating down the leg associated with the back pain.      ROS:  General: -fever, -chills, -fatigue, -weight gain, -weight loss  Eyes: -vision changes, -redness, -discharge  ENT: -ear pain, -nasal congestion, -sore throat  Cardiovascular: -chest pain, -palpitations, -lower extremity edema  Respiratory: -cough, -shortness of breath  Gastrointestinal: -abdominal pain, -nausea, -vomiting, -diarrhea, -constipation, -blood in stool  Genitourinary: -dysuria, -hematuria, -frequency  Musculoskeletal: -joint pain, -muscle pain, +limb pain, +pain with movement, +back pain  Skin: -rash,  -lesion  Neurological: -headache, -dizziness, -numbness, -tingling  Psychiatric: -anxiety, -depression, -sleep difficulty         Pmh, Psh, Family Hx, Social Hx updated in Epic Tabs today.         8/5/2025     2:02 PM 1/3/2025     2:48 PM 5/31/2024     3:12 PM 12/29/2023     7:49 AM 10/14/2021     7:56 AM   Depression Patient Health Questionnaire   Over the last two weeks how often have you been bothered by little interest or pleasure in doing things Not at all Not at all Not at all Not at all Not at all    Over the last two weeks how often have you been bothered by feeling down, depressed or hopeless Not at all Not at all Not at all Several days Not at all    PHQ-2 Total Score 0 0 0 1 0       Data saved with a previous flowsheet row definition       Active Problem List with Overview Notes    Diagnosis Date Noted    Migraine without status migrainosus, not intractable 01/03/2025    Decreased functional activity tolerance 03/20/2024    Gait abnormality 03/20/2024    Balance problem 03/20/2024    Decreased strength 03/20/2024    Overweight (BMI 25.0-29.9) 12/29/2023    Moderate left ankle sprain 01/22/2021    Goiter 10/14/2020    Instability of right shoulder joint 02/17/2020    Myofacial muscle pain 01/29/2016     Dx updated per 2019 IMO Load      Rotator cuff tendinitis 01/29/2016    Winged scapula of right side 01/29/2016    Recurrent subluxation of shoulder 01/29/2016    Chronic dislocation of right shoulder 10/11/2015       Past Medical History:   Diagnosis Date    Goiter 10/14/2020       Past Surgical History:   Procedure Laterality Date    None         Family History   Problem Relation Name Age of Onset    Hypertension Mother      Other Mother          borderline diabetes    No Known Problems Father      No Known Problems Sister      No Known Problems Brother      Breast cancer Maternal Grandmother      Other Maternal Grandmother          borderline diabetes    Coronary artery disease Maternal Grandmother       Lung cancer Paternal Grandfather      Stroke Neg Hx         Social History     Socioeconomic History    Marital status: Single   Tobacco Use    Smoking status: Never    Smokeless tobacco: Never   Substance and Sexual Activity    Alcohol use: Yes     Alcohol/week: 0.0 standard drinks of alcohol     Comment: Occasionally    Drug use: No    Sexual activity: Yes     Partners: Male     Birth control/protection: Condom   Social History Narrative    She wear seatbelt. 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 - nursing major.     Social Drivers of Health     Financial Resource Strain: High Risk (8/5/2025)    Overall Financial Resource Strain (CARDIA)     Difficulty of Paying Living Expenses: Very hard   Food Insecurity: Food Insecurity Present (8/5/2025)    Hunger Vital Sign     Worried About Running Out of Food in the Last Year: Often true     Ran Out of Food in the Last Year: Often true   Transportation Needs: No Transportation Needs (8/5/2025)    PRAPARE - Transportation     Lack of Transportation (Medical): No     Lack of Transportation (Non-Medical): No   Physical Activity: Sufficiently Active (8/5/2025)    Exercise Vital Sign     Days of Exercise per Week: 6 days     Minutes of Exercise per Session: 60 min   Stress: Stress Concern Present (8/5/2025)    [facility] [place] of Occupational Health - Occupational Stress Questionnaire     Feeling of Stress : To some extent   Housing Stability: High Risk (8/5/2025)    Housing Stability Vital Sign     Unable to Pay for Housing in the Last Year: Yes     Number of Times Moved in the Last Year: 0     Homeless in the Last Year: No       Medications Ordered Prior to Encounter[1]    Review of patient's allergies indicates:   Allergen Reactions    Food allergy formula  [[id number]-selen-brom] Hives       General - Well developed, alert and oriented in NAD  HEENT - normocephalic, no evidence of trauma, sclera white, EOMI  Neck - full range of motion  COR - regular rate and rhythm without murmurs or  "gallops  Lungs - Clear  Abdomen - soft, non-tender  Ext - no cyanosis     Assessment:     1. Tendonitis, Achilles, right    2. Chronic left-sided low back pain without sciatica        Pertinent Labs:    Chemistry        Component Value Date/Time     01/17/2025 1458    K 3.8 01/17/2025 1458     01/17/2025 1458    CO2 27 01/17/2025 1458    BUN 12 01/17/2025 1458    CREATININE 0.9 01/17/2025 1458    GLU 84 01/17/2025 1458        Component Value Date/Time    CALCIUM 9.2 01/17/2025 1458    ALKPHOS 62 01/17/2025 1458    AST 23 01/17/2025 1458    ALT 16 01/17/2025 1458    BILITOT 0.7 01/17/2025 1458    ESTGFRAFRICA >60.0 10/14/2020 0837    EGFRNONAA >60.0 10/14/2020 0837          Lab Results   Component Value Date    WBC 4.75 01/03/2025    HGB 12.3 01/03/2025    HCT 37.8 01/03/2025    MCV 90 01/03/2025    MCH 29.4 01/03/2025    MCHC 32.5 01/03/2025    RDW 13.0 01/03/2025     01/03/2025    MPV 12.0 01/03/2025       Lab Results   Component Value Date    HGBA1C 5.3 01/03/2025    HGBA1C 5.4 12/29/2023    GLU 84 01/17/2025     Lab Results   Component Value Date    LDLCALC 98.2 01/03/2025     Lab Results   Component Value Date    TSH 0.684 01/03/2025     Lab Results   Component Value Date    CHOL 176 01/03/2025    CHOL 207 (H) 12/29/2023    CHOL 174 10/14/2020     Lab Results   Component Value Date    TRIG 49 01/03/2025    TRIG 56 12/29/2023    TRIG 43 10/14/2020     Lab Results   Component Value Date    HDL 68 01/03/2025    HDL 74 12/29/2023    HDL 70 10/14/2020     Lab Results   Component Value Date    LDLCALC 98.2 01/03/2025    LDLCALC 121.8 12/29/2023    LDLCALC 95.4 10/14/2020     No results found for: "NONHDLC"  Lab Results   Component Value Date    CHOLHDL 38.6 01/03/2025    CHOLHDL 35.7 12/29/2023    CHOLHDL 40.2 10/14/2020       The ASCVD Risk score (Jacques CHONG, et al., 2019) failed to calculate for the following reasons:    The 2019 ASCVD risk score is only valid for ages 40 to 79    Plan: "     Assessment & Plan    Assessed ankle pain, likely strain in Achilles tendon mid-section, not at insertion.  Evaluated low back pain, likely musculoskeletal in nature.  Ruled out UTI as cause of back pain based on symptom duration and lack of associated symptoms.    RIGHT ACHILLES TENDINITIS:  - Diagnosed Achilles tendinitis based on symptoms and physical exam, which revealed pain in the right ankle area particularly when pressure is applied, when waking up, stepping down, and going up on toes.  - No pain in other areas of the leg.  - Determined no x-ray needed as the issue is not bone-related.  - Explained Achilles tendon's role in foot stabilization and discussed typical healing time for tendon injuries (approximately 6 weeks).  - Advised to continue ice and heat application, avoid strenuous activities like jumping or running until pain subsides, and gradually reintroduce exercises as condition improves.  - Prescribed ibuprofen or acetaminophen as needed for pain management.  - Referred to [facility] for specialized treatment.    LOW BACK PAIN:  - Evaluated lower back pain that occurs particularly when twisting, with no pain when applying pressure to the area.  - Assessed the condition as likely musculoskeletal in nature, not related to urinary infection due to lack of symptoms like dysuria or fever.  - Instructed the patient to perform back exercises, including stretching and strengthening.  - Referred to physical therapy for comprehensive back pain management and continued ibuprofen as needed for pain relief.    HISTORY OF ANKLE SPRAIN:  - Noted the patient has a history of ankle sprain in 2023, which was treated with physical therapy last year.    FOLLOW-UP:  - Advised to follow up if ankle pain does not improve with current treatment plan.  - Instructed patient to monitor for signs of UTI (painful urination, fever).         1. Tendonitis, Achilles, right  - Ambulatory Referral/Consult to Physical  Therapy    2. Chronic left-sided low back pain without sciatica  - Ambulatory Referral/Consult to Physical Therapy      Immunization History   Administered Date(s) Administered    COVID-19, MRNA, LN-S, PF (Pfizer) (Purple Cap) 01/21/2021, 02/11/2021    HPV Quadrivalent 06/13/2008, 08/18/2008, 08/06/2009    Hepatitis B, Adult 03/23/2020, 04/29/2020, 09/23/2020    Influenza 04/15/2019, 02/17/2020, 09/23/2020    Influenza - Quadrivalent - PF *Preferred* (6 months and older) 02/17/2020    Influenza - Trivalent - PF (PED) 10/30/2009, 02/15/2013    MMR 07/03/2019, 03/23/2020    Meningococcal Conjugate (MCV4P) 08/17/2011, 02/15/2013    Td (ADULT) 07/27/2020    Tdap 03/07/2007    Varicella 03/07/2007, 02/15/2013       Orders Placed This Encounter   Procedures    Ambulatory Referral/Consult to Physical Therapy    Ambulatory Referral/Consult to Physical Therapy       Portions of this note were generated by [employer].    Each patient to whom medical services by telemedicine are provided:  (1) informed of the relationship between the physician and patient and the respective role of any other health care provider with respect to management of the patient; and (2) notified that he or she may decline to receive medical services by telemedicine and may withdraw from such care at any time.    I spent a total of 32 minutes face to face and non-face to face on the date of this visit.This includes time preparing to see the patient (eg, review of tests, notes), obtaining and/or reviewing additional history from an independent historian and/or outside medical records, documenting clinical information in the electronic health record, independently interpreting results and/or communicating results to the patient/family/caregiver, or care coordinator.  Visit today included increased complexity associated with the care of the episodic problem addressed and managing the longitudinal care of the patient due to the serious and/or complex  managed problem(s).      This note was generated with the assistance of ambient listening technology. Verbal consent was obtained by the patient and accompanying visitor(s) for the recording of patient appointment to facilitate this note. I attest to having reviewed and edited the generated note for accuracy, though some syntax or spelling errors may persist. Please contact the author of this note for any clarification.      Twila Cornelius MD         [1]   Current Outpatient Medications on File Prior to Visit   Medication Sig Dispense Refill    dexbrompheniramine-phenyleph (ALAHIST PE) 2-7.5 mg Tab Take 1 tablet by mouth every 6 (six) hours as needed (sinus congestion). (Patient not taking: Reported on 8/5/2025) 30 tablet 2    traMADoL (ULTRAM) 50 mg tablet Take 1 tablet (50 mg total) by mouth every 6 (six) hours as needed for Pain. (Patient not taking: Reported on 8/5/2025) 12 tablet 0     No current facility-administered medications on file prior to visit.

## 2025-08-05 NOTE — LETTER
August 5, 2025      [facility]  [address] [address]  Phone: [phone]  Fax: [phone]       Patient: Swati Durán   YOB: 1995  Date of Visit: 08/05/2025    To Whom It May Concern:    Morgan Durán  was at [facility] on 08/05/2025. The patient may return to work/school on 08/06/2025 with no restrictions. If you have any questions or concerns, or if I can be of further assistance, please do not hesitate to contact me.    Sincerely,    Eusebia Soto LPN